# Patient Record
Sex: FEMALE | Race: WHITE | NOT HISPANIC OR LATINO | Employment: OTHER | URBAN - METROPOLITAN AREA
[De-identification: names, ages, dates, MRNs, and addresses within clinical notes are randomized per-mention and may not be internally consistent; named-entity substitution may affect disease eponyms.]

---

## 2023-02-21 LAB
ALANINE AMINOTRANSFERASE (SGPT) (U/L) IN SER/PLAS: 9 U/L (ref 7–45)
ALBUMIN (G/DL) IN SER/PLAS: 4.2 G/DL (ref 3.4–5)
ALKALINE PHOSPHATASE (U/L) IN SER/PLAS: 78 U/L (ref 33–136)
ANION GAP IN SER/PLAS: 13 MMOL/L (ref 10–20)
ASPARTATE AMINOTRANSFERASE (SGOT) (U/L) IN SER/PLAS: 14 U/L (ref 9–39)
BILIRUBIN TOTAL (MG/DL) IN SER/PLAS: 0.7 MG/DL (ref 0–1.2)
CALCIUM (MG/DL) IN SER/PLAS: 9.3 MG/DL (ref 8.6–10.3)
CARBON DIOXIDE, TOTAL (MMOL/L) IN SER/PLAS: 28 MMOL/L (ref 21–32)
CHLORIDE (MMOL/L) IN SER/PLAS: 103 MMOL/L (ref 98–107)
CHOLESTEROL (MG/DL) IN SER/PLAS: 266 MG/DL (ref 0–199)
CHOLESTEROL IN HDL (MG/DL) IN SER/PLAS: 65.6 MG/DL
CHOLESTEROL/HDL RATIO: 4.1
CREATININE (MG/DL) IN SER/PLAS: 1.49 MG/DL (ref 0.5–1.05)
ERYTHROCYTE DISTRIBUTION WIDTH (RATIO) BY AUTOMATED COUNT: 12.3 % (ref 11.5–14.5)
ERYTHROCYTE MEAN CORPUSCULAR HEMOGLOBIN CONCENTRATION (G/DL) BY AUTOMATED: 30.9 G/DL (ref 32–36)
ERYTHROCYTE MEAN CORPUSCULAR VOLUME (FL) BY AUTOMATED COUNT: 96 FL (ref 80–100)
ERYTHROCYTES (10*6/UL) IN BLOOD BY AUTOMATED COUNT: 4.04 X10E12/L (ref 4–5.2)
GFR FEMALE: 36 ML/MIN/1.73M2
GLUCOSE (MG/DL) IN SER/PLAS: 85 MG/DL (ref 74–99)
HEMATOCRIT (%) IN BLOOD BY AUTOMATED COUNT: 38.8 % (ref 36–46)
HEMOGLOBIN (G/DL) IN BLOOD: 12 G/DL (ref 12–16)
LDL: 176 MG/DL (ref 0–99)
LEUKOCYTES (10*3/UL) IN BLOOD BY AUTOMATED COUNT: 9.5 X10E9/L (ref 4.4–11.3)
PLATELETS (10*3/UL) IN BLOOD AUTOMATED COUNT: 283 X10E9/L (ref 150–450)
POTASSIUM (MMOL/L) IN SER/PLAS: 4.4 MMOL/L (ref 3.5–5.3)
PROTEIN TOTAL: 6.5 G/DL (ref 6.4–8.2)
SODIUM (MMOL/L) IN SER/PLAS: 140 MMOL/L (ref 136–145)
TRIGLYCERIDE (MG/DL) IN SER/PLAS: 121 MG/DL (ref 0–149)
UREA NITROGEN (MG/DL) IN SER/PLAS: 33 MG/DL (ref 6–23)
VLDL: 24 MG/DL (ref 0–40)

## 2023-05-08 ENCOUNTER — TELEPHONE (OUTPATIENT)
Dept: PRIMARY CARE | Facility: CLINIC | Age: 78
End: 2023-05-08
Payer: MEDICARE

## 2023-05-11 ENCOUNTER — PATIENT OUTREACH (OUTPATIENT)
Dept: PRIMARY CARE | Facility: CLINIC | Age: 78
End: 2023-05-11
Payer: MEDICARE

## 2023-05-11 DIAGNOSIS — K50.119: ICD-10-CM

## 2023-05-11 RX ORDER — AMLODIPINE BESYLATE 10 MG/1
1 TABLET ORAL DAILY
COMMUNITY
Start: 2021-05-11 | End: 2023-08-15 | Stop reason: SDUPTHER

## 2023-05-11 RX ORDER — DICYCLOMINE HYDROCHLORIDE 10 MG/1
10 CAPSULE ORAL 4 TIMES DAILY
COMMUNITY
Start: 2023-05-09 | End: 2024-03-11 | Stop reason: WASHOUT

## 2023-05-11 RX ORDER — CLOPIDOGREL BISULFATE 75 MG/1
1 TABLET ORAL DAILY
COMMUNITY
Start: 2012-05-30 | End: 2023-10-06 | Stop reason: SDUPTHER

## 2023-05-11 RX ORDER — LISINOPRIL 10 MG/1
5 TABLET ORAL 2 TIMES DAILY
COMMUNITY
Start: 2013-01-04 | End: 2023-10-06 | Stop reason: SDUPTHER

## 2023-05-11 RX ORDER — TIZANIDINE 2 MG/1
1 TABLET ORAL NIGHTLY
COMMUNITY
Start: 2022-09-01 | End: 2024-04-16 | Stop reason: ALTCHOICE

## 2023-05-11 RX ORDER — PANTOPRAZOLE SODIUM 40 MG/1
1 TABLET, DELAYED RELEASE ORAL 2 TIMES DAILY
COMMUNITY
Start: 2018-10-24 | End: 2024-04-15 | Stop reason: SDUPTHER

## 2023-05-11 RX ORDER — HYDROXYCHLOROQUINE SULFATE 200 MG/1
1 TABLET, FILM COATED ORAL DAILY
COMMUNITY
Start: 2017-03-10

## 2023-05-11 RX ORDER — ROSUVASTATIN CALCIUM 10 MG/1
1 TABLET, COATED ORAL NIGHTLY
COMMUNITY
Start: 2021-10-26 | End: 2024-03-11 | Stop reason: WASHOUT

## 2023-05-11 RX ORDER — ONDANSETRON 4 MG/1
8 TABLET, FILM COATED ORAL EVERY 8 HOURS PRN
COMMUNITY
Start: 2023-05-09 | End: 2024-04-16 | Stop reason: WASHOUT

## 2023-05-11 NOTE — PROGRESS NOTES
Discharge Facility: [facility]  Discharge Diagnosis: SHEKHAR on CKD; Segmental Colitis  Admission Date: 5/8/23  Discharge Date: 5/9/23    PCP Appointment Date: 5/22/23  Specialist Appointment Date: GI- waiting to schedule colonoscopy  Hospital Encounter and Summary: Linked   See discharge assessment below for further details   Engagement  Call Start Time: 1500 (5/11/2023  4:56 PM)    Medications  Medications reviewed with patient/caregiver?: Yes (5/11/2023  4:56 PM)  Is the patient having any side effects they believe may be caused by any medication additions or changes?: No (5/11/2023  4:56 PM)  Does the patient have all medications ordered at discharge?: Yes (5/11/2023  4:56 PM)  Care Management Interventions: No intervention needed (5/11/2023  4:56 PM)  Is the patient taking all medications as directed (includes completed medication regime)?: Yes (5/11/2023  4:56 PM)  Care Management Interventions: Provided patient education (5/11/2023  4:56 PM)  Medication Comments: See medication list (5/11/2023  4:56 PM)    Appointments  Does the patient have a primary care provider?: Yes (5/11/2023  4:56 PM)  Care Management Interventions: Verified appointment date/time/provider (5/11/2023  4:56 PM)  Has the patient kept scheduled appointments due by today?: Not applicable (5/11/2023  4:56 PM)  Care Management Interventions: Advised patient to keep appointment (5/11/2023  4:56 PM)    Self Management  What is the home health agency?: N/A (5/11/2023  4:56 PM)  Has home health visited the patient within 72 hours of discharge?: Not applicable (5/11/2023  4:56 PM)  What Durable Medical Equipment (DME) was ordered?: N/A (5/11/2023  4:56 PM)    Patient Teaching  Does the patient have access to their discharge instructions?: Yes (5/11/2023  4:56 PM)  Care Management Interventions: Reviewed instructions with patient (5/11/2023  4:56 PM)  What is the patient's perception of their health status since discharge?: Improving (5/11/2023  4:56  PM)  Is the patient/caregiver able to teach back the hierarchy of who to call/visit for symptoms/problems? PCP, Specialist, Home Health nurse, Urgent Care, ED, 911: Yes (5/11/2023  4:56 PM)    Wrap Up  Wrap Up Additional Comments: Patient states she is home and recovering well post hospitalization. Patient denied any new or worsening symptoms. Patient denied need for DME or home health services. Patient states she has help in the home from family if needed. PCP follow up appt scheduled. Patient states she has her discharge summary and all medications needed in the home. Patient denied any concerns or issues regarding her medications or hospitalization. Patient was encouraged to call TCM with any questions or concerns to prevent re-hospitalization. Phone number provided to patient. Patient verbalized understanding and stated she had no further questions or concerns at this time. (5/11/2023  4:56 PM)  Call End Time: 1515 (5/11/2023  4:56 PM)

## 2023-05-15 ENCOUNTER — TELEPHONE (OUTPATIENT)
Dept: PRIMARY CARE | Facility: CLINIC | Age: 78
End: 2023-05-15
Payer: MEDICARE

## 2023-05-22 ENCOUNTER — OFFICE VISIT (OUTPATIENT)
Dept: PRIMARY CARE | Facility: CLINIC | Age: 78
End: 2023-05-22
Payer: MEDICARE

## 2023-05-22 ENCOUNTER — APPOINTMENT (OUTPATIENT)
Dept: PRIMARY CARE | Facility: CLINIC | Age: 78
End: 2023-05-22
Payer: MEDICARE

## 2023-05-22 ENCOUNTER — LAB (OUTPATIENT)
Dept: LAB | Facility: LAB | Age: 78
End: 2023-05-22
Payer: MEDICARE

## 2023-05-22 VITALS
OXYGEN SATURATION: 96 % | BODY MASS INDEX: 25.68 KG/M2 | SYSTOLIC BLOOD PRESSURE: 118 MMHG | DIASTOLIC BLOOD PRESSURE: 62 MMHG | HEIGHT: 60 IN | WEIGHT: 130.8 LBS | RESPIRATION RATE: 18 BRPM | HEART RATE: 76 BPM

## 2023-05-22 DIAGNOSIS — Z09 HOSPITAL DISCHARGE FOLLOW-UP: ICD-10-CM

## 2023-05-22 DIAGNOSIS — N17.9 ACUTE KIDNEY INJURY (CMS-HCC): ICD-10-CM

## 2023-05-22 DIAGNOSIS — R07.81 RIB PAIN ON RIGHT SIDE: Primary | ICD-10-CM

## 2023-05-22 PROBLEM — G89.29 CHRONIC LOW BACK PAIN: Status: ACTIVE | Noted: 2023-05-22

## 2023-05-22 PROBLEM — R29.898 WEAKNESS OF BOTH LOWER EXTREMITIES: Status: RESOLVED | Noted: 2023-05-22 | Resolved: 2023-05-22

## 2023-05-22 PROBLEM — L90.5 SCAR ADHERENT: Status: RESOLVED | Noted: 2023-05-22 | Resolved: 2023-05-22

## 2023-05-22 PROBLEM — M43.10 ACQUIRED SPONDYLOLISTHESIS: Status: ACTIVE | Noted: 2023-05-22

## 2023-05-22 PROBLEM — Z96.659 PAIN DUE TO TOTAL KNEE REPLACEMENT, INITIAL ENCOUNTER (CMS-HCC): Status: RESOLVED | Noted: 2023-05-22 | Resolved: 2023-05-22

## 2023-05-22 PROBLEM — M79.641 PAIN IN BOTH HANDS: Status: RESOLVED | Noted: 2023-05-22 | Resolved: 2023-05-22

## 2023-05-22 PROBLEM — M25.532 PAIN IN BOTH WRISTS: Status: RESOLVED | Noted: 2023-05-22 | Resolved: 2023-05-22

## 2023-05-22 PROBLEM — R26.2 DIFFICULTY WALKING: Status: RESOLVED | Noted: 2023-05-22 | Resolved: 2023-05-22

## 2023-05-22 PROBLEM — M25.552 PAIN OF LEFT HIP JOINT: Status: RESOLVED | Noted: 2023-05-22 | Resolved: 2023-05-22

## 2023-05-22 PROBLEM — K44.9 HIATAL HERNIA: Status: RESOLVED | Noted: 2023-05-22 | Resolved: 2023-05-22

## 2023-05-22 PROBLEM — M19.049 OSTEOARTHRITIS, HAND: Status: RESOLVED | Noted: 2023-05-22 | Resolved: 2023-05-22

## 2023-05-22 PROBLEM — H02.9 EYELID ABNORMALITY: Status: RESOLVED | Noted: 2023-05-22 | Resolved: 2023-05-22

## 2023-05-22 PROBLEM — R82.90 ABNORMAL URINE FINDINGS: Status: RESOLVED | Noted: 2023-05-22 | Resolved: 2023-05-22

## 2023-05-22 PROBLEM — G56.02 CARPAL TUNNEL SYNDROME OF LEFT WRIST: Status: RESOLVED | Noted: 2023-05-22 | Resolved: 2023-05-22

## 2023-05-22 PROBLEM — H25.12 AGE-RELATED NUCLEAR CATARACT OF LEFT EYE: Status: ACTIVE | Noted: 2023-05-22

## 2023-05-22 PROBLEM — M54.9 ACUTE BACK PAIN: Status: RESOLVED | Noted: 2023-05-22 | Resolved: 2023-05-22

## 2023-05-22 PROBLEM — T84.84XA PAIN DUE TO TOTAL KNEE REPLACEMENT, INITIAL ENCOUNTER (CMS-HCC): Status: RESOLVED | Noted: 2023-05-22 | Resolved: 2023-05-22

## 2023-05-22 PROBLEM — H25.13 CATARACT, NUCLEAR SCLEROTIC, BOTH EYES: Status: RESOLVED | Noted: 2023-05-22 | Resolved: 2023-05-22

## 2023-05-22 PROBLEM — I65.22 LEFT-SIDED EXTRACRANIAL CAROTID ARTERY OCCLUSION: Status: RESOLVED | Noted: 2018-12-19 | Resolved: 2023-05-22

## 2023-05-22 PROBLEM — L71.8 OCULAR ROSACEA: Status: RESOLVED | Noted: 2023-05-22 | Resolved: 2023-05-22

## 2023-05-22 PROBLEM — K21.9 ESOPHAGEAL REFLUX: Status: ACTIVE | Noted: 2023-05-22

## 2023-05-22 PROBLEM — R42 DIZZINESS: Status: RESOLVED | Noted: 2021-07-08 | Resolved: 2023-05-22

## 2023-05-22 PROBLEM — M65.321 TRIGGER INDEX FINGER OF RIGHT HAND: Status: RESOLVED | Noted: 2023-05-22 | Resolved: 2023-05-22

## 2023-05-22 PROBLEM — H81.10 BENIGN PAROXYSMAL POSITIONAL VERTIGO: Status: ACTIVE | Noted: 2021-07-08

## 2023-05-22 PROBLEM — M43.16 SPONDYLOLISTHESIS OF LUMBAR REGION: Status: RESOLVED | Noted: 2023-05-22 | Resolved: 2023-05-22

## 2023-05-22 PROBLEM — H25.11 AGE-RELATED NUCLEAR CATARACT OF RIGHT EYE: Status: RESOLVED | Noted: 2023-05-22 | Resolved: 2023-05-22

## 2023-05-22 PROBLEM — H90.3 SENSORINEURAL HEARING LOSS, BILATERAL: Status: ACTIVE | Noted: 2021-08-26

## 2023-05-22 PROBLEM — M25.639 WRIST STIFFNESS: Status: RESOLVED | Noted: 2023-05-22 | Resolved: 2023-05-22

## 2023-05-22 PROBLEM — L60.8: Status: RESOLVED | Noted: 2023-05-22 | Resolved: 2023-05-22

## 2023-05-22 PROBLEM — M16.12 ARTHRITIS OF LEFT HIP: Status: RESOLVED | Noted: 2023-05-22 | Resolved: 2023-05-22

## 2023-05-22 PROBLEM — E78.5 HYPERLIPIDEMIA: Status: ACTIVE | Noted: 2023-05-22

## 2023-05-22 PROBLEM — M25.511 RIGHT SHOULDER PAIN: Status: RESOLVED | Noted: 2023-05-22 | Resolved: 2023-05-22

## 2023-05-22 PROBLEM — N28.89 RENAL MASS, RIGHT: Status: RESOLVED | Noted: 2023-05-22 | Resolved: 2023-05-22

## 2023-05-22 PROBLEM — S60.221A CONTUSION OF RIGHT HAND: Status: RESOLVED | Noted: 2023-05-22 | Resolved: 2023-05-22

## 2023-05-22 PROBLEM — M46.1 SACROILIITIS (CMS-HCC): Status: RESOLVED | Noted: 2023-05-22 | Resolved: 2023-05-22

## 2023-05-22 PROBLEM — M54.50 CHRONIC LOW BACK PAIN: Status: ACTIVE | Noted: 2023-05-22

## 2023-05-22 PROBLEM — R26.9 GAIT DIFFICULTY: Status: RESOLVED | Noted: 2023-05-22 | Resolved: 2023-05-22

## 2023-05-22 PROBLEM — K58.0 IRRITABLE BOWEL SYNDROME WITH DIARRHEA: Status: ACTIVE | Noted: 2023-05-22

## 2023-05-22 PROBLEM — M25.561 RIGHT KNEE PAIN: Status: RESOLVED | Noted: 2023-05-22 | Resolved: 2023-05-22

## 2023-05-22 PROBLEM — G56.01 CARPAL TUNNEL SYNDROME OF RIGHT WRIST: Status: RESOLVED | Noted: 2023-05-22 | Resolved: 2023-05-22

## 2023-05-22 PROBLEM — Z96.652 HISTORY OF TOTAL LEFT KNEE REPLACEMENT: Status: RESOLVED | Noted: 2023-05-22 | Resolved: 2023-05-22

## 2023-05-22 PROBLEM — H25.11 AGE-RELATED NUCLEAR CATARACT OF RIGHT EYE: Status: ACTIVE | Noted: 2023-05-22

## 2023-05-22 PROBLEM — M48.062 NEUROGENIC CLAUDICATION DUE TO LUMBAR SPINAL STENOSIS: Status: RESOLVED | Noted: 2023-05-22 | Resolved: 2023-05-22

## 2023-05-22 PROBLEM — H04.123 DRY EYE SYNDROME OF BOTH LACRIMAL GLANDS: Status: RESOLVED | Noted: 2023-05-22 | Resolved: 2023-05-22

## 2023-05-22 PROBLEM — H52.03 HYPEROPIA OF BOTH EYES: Status: RESOLVED | Noted: 2023-05-22 | Resolved: 2023-05-22

## 2023-05-22 PROBLEM — T46.6X5A MYALGIA DUE TO STATIN: Status: ACTIVE | Noted: 2023-05-22

## 2023-05-22 PROBLEM — R21 RASH OF FACE: Status: RESOLVED | Noted: 2023-05-22 | Resolved: 2023-05-22

## 2023-05-22 PROBLEM — I10 BENIGN ESSENTIAL HYPERTENSION: Status: ACTIVE | Noted: 2023-05-22

## 2023-05-22 PROBLEM — L71.9 ROSACEA: Status: RESOLVED | Noted: 2023-05-22 | Resolved: 2023-05-22

## 2023-05-22 PROBLEM — M25.562 LEFT KNEE PAIN: Status: ACTIVE | Noted: 2017-11-13

## 2023-05-22 PROBLEM — M25.832 MASS OF JOINT OF LEFT WRIST: Status: RESOLVED | Noted: 2023-05-22 | Resolved: 2023-05-22

## 2023-05-22 PROBLEM — M32.9 SYSTEMIC LUPUS ERYTHEMATOSUS (MULTI): Status: RESOLVED | Noted: 2023-05-22 | Resolved: 2023-05-22

## 2023-05-22 PROBLEM — M17.11 ARTHRITIS OF RIGHT KNEE: Status: RESOLVED | Noted: 2023-05-22 | Resolved: 2023-05-22

## 2023-05-22 PROBLEM — M25.562 LEFT KNEE PAIN: Status: RESOLVED | Noted: 2017-11-13 | Resolved: 2023-05-22

## 2023-05-22 PROBLEM — H01.019 ULCERATIVE BLEPHARITIS: Status: RESOLVED | Noted: 2023-05-22 | Resolved: 2023-05-22

## 2023-05-22 PROBLEM — M25.531 PAIN IN BOTH WRISTS: Status: RESOLVED | Noted: 2023-05-22 | Resolved: 2023-05-22

## 2023-05-22 PROBLEM — K21.00 GASTROESOPHAGEAL REFLUX DISEASE WITH ESOPHAGITIS: Status: RESOLVED | Noted: 2023-05-22 | Resolved: 2023-05-22

## 2023-05-22 PROBLEM — H81.10 BENIGN PAROXYSMAL POSITIONAL VERTIGO: Status: RESOLVED | Noted: 2021-07-08 | Resolved: 2023-05-22

## 2023-05-22 PROBLEM — E55.9 MILD VITAMIN D DEFICIENCY: Status: RESOLVED | Noted: 2023-05-22 | Resolved: 2023-05-22

## 2023-05-22 PROBLEM — G60.9 IDIOPATHIC PERIPHERAL NEUROPATHY: Status: ACTIVE | Noted: 2023-05-22

## 2023-05-22 PROBLEM — H35.363 DRUSEN (DEGENERATIVE) OF MACULA, BILATERAL: Status: RESOLVED | Noted: 2023-05-22 | Resolved: 2023-05-22

## 2023-05-22 PROBLEM — M19.049 OSTEOARTHRITIS, HAND: Status: ACTIVE | Noted: 2023-05-22

## 2023-05-22 PROBLEM — R42 VERTIGO: Status: RESOLVED | Noted: 2023-05-22 | Resolved: 2023-05-22

## 2023-05-22 PROBLEM — K57.30 DIVERTICULOSIS OF COLON: Status: RESOLVED | Noted: 2023-05-22 | Resolved: 2023-05-22

## 2023-05-22 PROBLEM — M79.642 PAIN IN BOTH HANDS: Status: RESOLVED | Noted: 2023-05-22 | Resolved: 2023-05-22

## 2023-05-22 PROBLEM — R29.898 WRIST WEAKNESS: Status: RESOLVED | Noted: 2023-05-22 | Resolved: 2023-05-22

## 2023-05-22 PROBLEM — E28.39 ESTROGEN DEFICIENCY: Status: RESOLVED | Noted: 2023-05-22 | Resolved: 2023-05-22

## 2023-05-22 PROBLEM — H25.12 AGE-RELATED NUCLEAR CATARACT OF LEFT EYE: Status: RESOLVED | Noted: 2023-05-22 | Resolved: 2023-05-22

## 2023-05-22 PROBLEM — M79.10 MYALGIA DUE TO STATIN: Status: ACTIVE | Noted: 2023-05-22

## 2023-05-22 PROBLEM — M70.62 GREATER TROCHANTERIC BURSITIS OF LEFT HIP: Status: RESOLVED | Noted: 2023-05-22 | Resolved: 2023-05-22

## 2023-05-22 PROBLEM — S69.91XA INJURY OF FINGER OF RIGHT HAND: Status: RESOLVED | Noted: 2023-05-22 | Resolved: 2023-05-22

## 2023-05-22 PROBLEM — M65.30 TRIGGER FINGER, ACQUIRED: Status: RESOLVED | Noted: 2023-05-22 | Resolved: 2023-05-22

## 2023-05-22 LAB
ALANINE AMINOTRANSFERASE (SGPT) (U/L) IN SER/PLAS: 16 U/L (ref 7–45)
ALBUMIN (G/DL) IN SER/PLAS: 4.4 G/DL (ref 3.4–5)
ALKALINE PHOSPHATASE (U/L) IN SER/PLAS: 67 U/L (ref 33–136)
ANION GAP IN SER/PLAS: 13 MMOL/L (ref 10–20)
ASPARTATE AMINOTRANSFERASE (SGOT) (U/L) IN SER/PLAS: 21 U/L (ref 9–39)
BILIRUBIN TOTAL (MG/DL) IN SER/PLAS: 0.4 MG/DL (ref 0–1.2)
CALCIUM (MG/DL) IN SER/PLAS: 9.4 MG/DL (ref 8.6–10.3)
CARBON DIOXIDE, TOTAL (MMOL/L) IN SER/PLAS: 26 MMOL/L (ref 21–32)
CHLORIDE (MMOL/L) IN SER/PLAS: 103 MMOL/L (ref 98–107)
CREATININE (MG/DL) IN SER/PLAS: 1.26 MG/DL (ref 0.5–1.05)
ERYTHROCYTE DISTRIBUTION WIDTH (RATIO) BY AUTOMATED COUNT: 12.5 % (ref 11.5–14.5)
ERYTHROCYTE MEAN CORPUSCULAR HEMOGLOBIN CONCENTRATION (G/DL) BY AUTOMATED: 31.2 G/DL (ref 32–36)
ERYTHROCYTE MEAN CORPUSCULAR VOLUME (FL) BY AUTOMATED COUNT: 96 FL (ref 80–100)
ERYTHROCYTES (10*6/UL) IN BLOOD BY AUTOMATED COUNT: 4.03 X10E12/L (ref 4–5.2)
GFR FEMALE: 44 ML/MIN/1.73M2
GLUCOSE (MG/DL) IN SER/PLAS: 92 MG/DL (ref 74–99)
HEMATOCRIT (%) IN BLOOD BY AUTOMATED COUNT: 38.5 % (ref 36–46)
HEMOGLOBIN (G/DL) IN BLOOD: 12 G/DL (ref 12–16)
LEUKOCYTES (10*3/UL) IN BLOOD BY AUTOMATED COUNT: 9 X10E9/L (ref 4.4–11.3)
PLATELETS (10*3/UL) IN BLOOD AUTOMATED COUNT: 340 X10E9/L (ref 150–450)
POTASSIUM (MMOL/L) IN SER/PLAS: 3.9 MMOL/L (ref 3.5–5.3)
PROTEIN TOTAL: 7.2 G/DL (ref 6.4–8.2)
SODIUM (MMOL/L) IN SER/PLAS: 138 MMOL/L (ref 136–145)
UREA NITROGEN (MG/DL) IN SER/PLAS: 29 MG/DL (ref 6–23)

## 2023-05-22 PROCEDURE — 99495 TRANSJ CARE MGMT MOD F2F 14D: CPT | Performed by: NURSE PRACTITIONER

## 2023-05-22 PROCEDURE — 36415 COLL VENOUS BLD VENIPUNCTURE: CPT

## 2023-05-22 PROCEDURE — 1159F MED LIST DOCD IN RCRD: CPT | Performed by: NURSE PRACTITIONER

## 2023-05-22 PROCEDURE — 1036F TOBACCO NON-USER: CPT | Performed by: NURSE PRACTITIONER

## 2023-05-22 PROCEDURE — 3074F SYST BP LT 130 MM HG: CPT | Performed by: NURSE PRACTITIONER

## 2023-05-22 PROCEDURE — 1157F ADVNC CARE PLAN IN RCRD: CPT | Performed by: NURSE PRACTITIONER

## 2023-05-22 PROCEDURE — 80053 COMPREHEN METABOLIC PANEL: CPT

## 2023-05-22 PROCEDURE — 1160F RVW MEDS BY RX/DR IN RCRD: CPT | Performed by: NURSE PRACTITIONER

## 2023-05-22 PROCEDURE — 85027 COMPLETE CBC AUTOMATED: CPT

## 2023-05-22 PROCEDURE — 3078F DIAST BP <80 MM HG: CPT | Performed by: NURSE PRACTITIONER

## 2023-05-22 RX ORDER — GLUCOSAMINE HCL 500 MG
1 TABLET ORAL DAILY
COMMUNITY
Start: 2017-03-23

## 2023-05-22 RX ORDER — AMITRIPTYLINE HYDROCHLORIDE 25 MG/1
TABLET, FILM COATED ORAL
COMMUNITY
Start: 2017-01-05 | End: 2023-10-23 | Stop reason: SDUPTHER

## 2023-05-22 RX ORDER — MECLIZINE HYDROCHLORIDE 25 MG/1
25 TABLET ORAL AS NEEDED
COMMUNITY
Start: 2021-06-23

## 2023-05-22 RX ORDER — ACETAMINOPHEN 500 MG
50 TABLET ORAL DAILY
COMMUNITY
Start: 2015-12-28

## 2023-05-22 RX ORDER — HYDROCHLOROTHIAZIDE 12.5 MG/1
1 TABLET ORAL DAILY
COMMUNITY
Start: 2023-02-14

## 2023-05-22 RX ORDER — ONDANSETRON 4 MG/1
4 TABLET, ORALLY DISINTEGRATING ORAL AS NEEDED
COMMUNITY
Start: 2021-06-23 | End: 2024-04-16 | Stop reason: ALTCHOICE

## 2023-05-22 NOTE — ASSESSMENT & PLAN NOTE
Hx of CVA  Recommended statin - had myaglia with rosuvastatin  Would like to try another statin   Discussed waiting until labs are complete and EGD and colonoscopy completed

## 2023-05-22 NOTE — PROGRESS NOTES
Subjective   Patient ID: Vicky Govea is a 78 y.o. female who presents for Hospital Follow-up (Patient in today for hospital F/U d/t Colitis, states she was hospitalized for one day. She would also like to discuss low RBC, LBP and to discuss going back on cholesterol medication ).    Following up for hospitalization for GIBleed, SHEKHAR, and colitis 5/8-5/9  + FOBT  Will have colonoscopy and EGD tomorrow with Dr. Herndon  Did receive IV ATB but was not discharge on oral ATB  Has not had repeat labs  Has not had BRBPR since3 5/10  Has had mild LLQ cramping since discharge.      Has had hx of CVA - was recommended to start statin - did try rosuvastatin but she had muscle aches in her feet and legs - which resolved when stopping  Would like to discuss another statin    Will have intermittent low thoracic back pain - will happen occasionally -last for 5 minutes - described as sharp and does not radiate.  Resolves on its own.  Denies fall or injury.             Review of Systems   All other systems reviewed and are negative.      Objective   /62   Pulse 76   Resp 18   Ht 1.524 m (5')   Wt 59.3 kg (130 lb 12.8 oz)   SpO2 96%   BMI 25.55 kg/m²     Physical Exam  Vitals and nursing note reviewed.   Constitutional:       Appearance: Normal appearance. She is normal weight.   HENT:      Head: Normocephalic and atraumatic.      Right Ear: External ear normal.      Left Ear: External ear normal.      Nose: Nose normal.      Mouth/Throat:      Mouth: Mucous membranes are moist.      Pharynx: Oropharynx is clear.   Eyes:      Extraocular Movements: Extraocular movements intact.      Pupils: Pupils are equal, round, and reactive to light.   Neck:      Vascular: No carotid bruit.   Cardiovascular:      Rate and Rhythm: Normal rate and regular rhythm.      Pulses: Normal pulses.      Heart sounds: Normal heart sounds.   Pulmonary:      Effort: Pulmonary effort is normal.      Breath sounds: Normal breath sounds.    Abdominal:      General: Bowel sounds are normal.      Palpations: Abdomen is soft.   Musculoskeletal:         General: Normal range of motion.      Cervical back: Normal range of motion.      Right lower leg: No edema.      Left lower leg: No edema.   Skin:     General: Skin is warm.      Capillary Refill: Capillary refill takes less than 2 seconds.   Neurological:      General: No focal deficit present.      Mental Status: She is alert and oriented to person, place, and time. Mental status is at baseline.   Psychiatric:         Mood and Affect: Mood normal.         Behavior: Behavior normal.         Thought Content: Thought content normal.         Judgment: Judgment normal.         Assessment/Plan

## 2023-05-22 NOTE — PATIENT INSTRUCTIONS
Have the lab work completed and I will let you know how it looks    I ordered a chest xray to look at the area in your back that hurts

## 2023-05-22 NOTE — PROGRESS NOTES
"Patient: Vicky Govea  : 1945  PCP: Ruma Martinez, APRN-CNP  MRN: [id number]  Program: No linked episodes     Vicky Govea is a 78 y.o. female presenting today for follow-up after being discharged from the hospital 11 days ago. The main problem requiring admission was colitis. The discharge summary and/or Transitional Care Management documentation was reviewed. Medication reconciliation was performed as indicated via the \"Enrrique as Reviewed\" timestamp.     Vicky Govea was contacted by Transitional Care Management services two days after her discharge. This encounter and supporting documentation was reviewed.    The complexity of medical decision making for this patient's transitional care is moderate.    Has had continue right posterior rib pain that does not radiate x 3 months.  No injury  Physical Exam  Vitals and nursing note reviewed.   Constitutional:       General: She is not in acute distress.     Appearance: Normal appearance. She is normal weight.   HENT:      Head: Normocephalic.      Right Ear: External ear normal.      Mouth/Throat:      Mouth: Mucous membranes are moist.      Pharynx: Oropharynx is clear.   Eyes:      Extraocular Movements: Extraocular movements intact.      Conjunctiva/sclera: Conjunctivae normal.      Pupils: Pupils are equal, round, and reactive to light.   Neck:      Vascular: No carotid bruit.   Cardiovascular:      Rate and Rhythm: Normal rate and regular rhythm.      Heart sounds: Normal heart sounds.   Pulmonary:      Effort: Pulmonary effort is normal.      Breath sounds: Normal breath sounds.   Abdominal:      General: Bowel sounds are normal. There is no distension.      Palpations: Abdomen is soft.   Musculoskeletal:      Right lower leg: No edema.      Left lower leg: No edema.   Lymphadenopathy:      Cervical: No cervical adenopathy.   Skin:     General: Skin is warm and dry.      Capillary Refill: Capillary refill takes less than 2 seconds. "   Neurological:      General: No focal deficit present.      Mental Status: She is alert. Mental status is at baseline.   Psychiatric:         Mood and Affect: Mood normal.         Behavior: Behavior normal.         Thought Content: Thought content normal.         Judgment: Judgment normal.         Assessment/Plan   Problem List Items Addressed This Visit          Genitourinary    Acute kidney injury (CMS/HCC)     Repeat cbc,cmp         Relevant Orders    CBC    Comprehensive Metabolic Panel       Musculoskeletal    Rib pain on right side - Primary     Xray chest         Relevant Orders    XR chest 2 views       Other    Hospital discharge follow-up     Reviewed hospital records  Cbc,cmp  Reviewed medications  Follow up with egd and colonoscopy tomorrow as scheduled            Review of Systems   All other systems reviewed and are negative.      No family history on file.    Engagement  Call Start Time: 1500 (5/11/2023  4:56 PM)    Medications  Medications reviewed with patient/caregiver?: Yes (5/11/2023  4:56 PM)  Is the patient having any side effects they believe may be caused by any medication additions or changes?: No (5/11/2023  4:56 PM)  Does the patient have all medications ordered at discharge?: Yes (5/11/2023  4:56 PM)  Care Management Interventions: No intervention needed (5/11/2023  4:56 PM)  Is the patient taking all medications as directed (includes completed medication regime)?: Yes (5/11/2023  4:56 PM)  Care Management Interventions: Provided patient education (5/11/2023  4:56 PM)  Medication Comments: See medication list (5/11/2023  4:56 PM)    Appointments  Does the patient have a primary care provider?: Yes (5/11/2023  4:56 PM)  Care Management Interventions: Verified appointment date/time/provider (5/11/2023  4:56 PM)  Has the patient kept scheduled appointments due by today?: Not applicable (5/11/2023  4:56 PM)  Care Management Interventions: Advised patient to keep appointment (5/11/2023  4:56  PM)    Self Management  What is the home health agency?: N/A (5/11/2023  4:56 PM)  Has home health visited the patient within 72 hours of discharge?: Not applicable (5/11/2023  4:56 PM)  What Durable Medical Equipment (DME) was ordered?: N/A (5/11/2023  4:56 PM)    Patient Teaching  Does the patient have access to their discharge instructions?: Yes (5/11/2023  4:56 PM)  Care Management Interventions: Reviewed instructions with patient (5/11/2023  4:56 PM)  What is the patient's perception of their health status since discharge?: Improving (5/11/2023  4:56 PM)  Is the patient/caregiver able to teach back the hierarchy of who to call/visit for symptoms/problems? PCP, Specialist, Home Health nurse, Urgent Care, ED, 911: Yes (5/11/2023  4:56 PM)    Wrap Up  Wrap Up Additional Comments: Patient states she is home and recovering well post hospitalization. Patient denied any new or worsening symptoms. Patient denied need for DME or home health services. Patient states she has help in the home from family if needed. PCP follow up appt scheduled. Patient states she has her discharge summary and all medications needed in the home. Patient denied any concerns or issues regarding her medications or hospitalization. Patient was encouraged to call TCM with any questions or concerns to prevent re-hospitalization. Phone number provided to patient. Patient verbalized understanding and stated she had no further questions or concerns at this time. (5/11/2023  4:56 PM)  Call End Time: 1515 (5/11/2023  4:56 PM)        No follow-ups on file.

## 2023-05-22 NOTE — ASSESSMENT & PLAN NOTE
Reviewed hospital records  Cbc,cmp  Reviewed medications  Follow up with egd and colonoscopy tomorrow as scheduled

## 2023-05-23 DIAGNOSIS — I63.9 CEREBRAL INFARCTION, UNSPECIFIED MECHANISM (MULTI): Primary | ICD-10-CM

## 2023-05-23 RX ORDER — ATORVASTATIN CALCIUM 10 MG/1
10 TABLET, FILM COATED ORAL DAILY
Qty: 30 TABLET | Refills: 5 | Status: SHIPPED | OUTPATIENT
Start: 2023-05-23 | End: 2023-09-19 | Stop reason: SDUPTHER

## 2023-05-23 NOTE — RESULT ENCOUNTER NOTE
Spoke with patient informed her of results she would like to start on Atorvastatin 10 mg and she would also like to know if there is something she can do to help increase her RBC.

## 2023-05-25 ENCOUNTER — PATIENT OUTREACH (OUTPATIENT)
Dept: PRIMARY CARE | Facility: CLINIC | Age: 78
End: 2023-05-25
Payer: MEDICARE

## 2023-05-25 DIAGNOSIS — R07.81 RIB PAIN ON RIGHT SIDE: ICD-10-CM

## 2023-05-25 NOTE — PROGRESS NOTES
Call regarding appt. with PCP on 5/22/23 after hospitalization.  At time of outreach call the patient feels as if their condition has improved since last visit.  Reviewed the PCP appointment with the pt and addressed any questions or concerns. Patient states she is feeling well lately with no questions or concerns at this time. Patient was encouraged to call TCM with any needs or questions that may arise.

## 2023-06-19 ENCOUNTER — PATIENT OUTREACH (OUTPATIENT)
Dept: PRIMARY CARE | Facility: CLINIC | Age: 78
End: 2023-06-19
Payer: MEDICARE

## 2023-06-19 DIAGNOSIS — N17.9 ACUTE KIDNEY INJURY (CMS-HCC): ICD-10-CM

## 2023-06-19 NOTE — PROGRESS NOTES
Call placed regarding one month post discharge follow up call.  At time of outreach call the patient feels as if their condition has returned to baseline since initial visit with PCP or specialist.  Questions or concerns regarding recovery period addressed at this time.   Reviewed any PCP or specialists progress notes/labs/radiology reports if applicable and addressed any questions or concerns. Patient denied any questions or concerns and states she is doing very well.

## 2023-07-28 LAB
C REACTIVE PROTEIN (MG/L) IN SER/PLAS: 0.14 MG/DL
SEDIMENTATION RATE, ERYTHROCYTE: 5 MM/H (ref 0–30)

## 2023-08-09 ENCOUNTER — PATIENT OUTREACH (OUTPATIENT)
Dept: PRIMARY CARE | Facility: CLINIC | Age: 78
End: 2023-08-09
Payer: MEDICARE

## 2023-08-09 DIAGNOSIS — N17.9 ACUTE KIDNEY INJURY (CMS-HCC): ICD-10-CM

## 2023-08-09 NOTE — PROGRESS NOTES
Unsuccessful outreach to this patient for the 90 day post discharge outreach. Left a voice-mail message including my direct call back number for the patient to call regarding any questions or concerns. Patient has met target of no readmissions for 90 days and has graduated from [facility]

## 2023-08-10 LAB
ALANINE AMINOTRANSFERASE (SGPT) (U/L) IN SER/PLAS: 12 U/L (ref 7–45)
ALBUMIN (G/DL) IN SER/PLAS: 4.2 G/DL (ref 3.4–5)
ALKALINE PHOSPHATASE (U/L) IN SER/PLAS: 63 U/L (ref 33–136)
ANION GAP IN SER/PLAS: 14 MMOL/L (ref 10–20)
ASPARTATE AMINOTRANSFERASE (SGOT) (U/L) IN SER/PLAS: 16 U/L (ref 9–39)
BILIRUBIN TOTAL (MG/DL) IN SER/PLAS: 0.5 MG/DL (ref 0–1.2)
CALCIUM (MG/DL) IN SER/PLAS: 9.2 MG/DL (ref 8.6–10.3)
CARBON DIOXIDE, TOTAL (MMOL/L) IN SER/PLAS: 26 MMOL/L (ref 21–32)
CHLORIDE (MMOL/L) IN SER/PLAS: 106 MMOL/L (ref 98–107)
CHOLESTEROL (MG/DL) IN SER/PLAS: 182 MG/DL (ref 0–199)
CHOLESTEROL IN HDL (MG/DL) IN SER/PLAS: 56.6 MG/DL
CHOLESTEROL/HDL RATIO: 3.2
CREATININE (MG/DL) IN SER/PLAS: 1.9 MG/DL (ref 0.5–1.05)
ERYTHROCYTE DISTRIBUTION WIDTH (RATIO) BY AUTOMATED COUNT: 12.9 % (ref 11.5–14.5)
ERYTHROCYTE MEAN CORPUSCULAR HEMOGLOBIN CONCENTRATION (G/DL) BY AUTOMATED: 30.4 G/DL (ref 32–36)
ERYTHROCYTE MEAN CORPUSCULAR VOLUME (FL) BY AUTOMATED COUNT: 96 FL (ref 80–100)
ERYTHROCYTES (10*6/UL) IN BLOOD BY AUTOMATED COUNT: 3.86 X10E12/L (ref 4–5.2)
GFR FEMALE: 27 ML/MIN/1.73M2
GLUCOSE (MG/DL) IN SER/PLAS: 102 MG/DL (ref 74–99)
HEMATOCRIT (%) IN BLOOD BY AUTOMATED COUNT: 37.2 % (ref 36–46)
HEMOGLOBIN (G/DL) IN BLOOD: 11.3 G/DL (ref 12–16)
LDL: 100 MG/DL (ref 0–99)
LEUKOCYTES (10*3/UL) IN BLOOD BY AUTOMATED COUNT: 6.8 X10E9/L (ref 4.4–11.3)
PLATELETS (10*3/UL) IN BLOOD AUTOMATED COUNT: 286 X10E9/L (ref 150–450)
POTASSIUM (MMOL/L) IN SER/PLAS: 4.5 MMOL/L (ref 3.5–5.3)
PROTEIN TOTAL: 6.4 G/DL (ref 6.4–8.2)
SODIUM (MMOL/L) IN SER/PLAS: 141 MMOL/L (ref 136–145)
TRIGLYCERIDE (MG/DL) IN SER/PLAS: 126 MG/DL (ref 0–149)
UREA NITROGEN (MG/DL) IN SER/PLAS: 32 MG/DL (ref 6–23)
VLDL: 25 MG/DL (ref 0–40)

## 2023-08-15 ENCOUNTER — OFFICE VISIT (OUTPATIENT)
Dept: PRIMARY CARE | Facility: CLINIC | Age: 78
End: 2023-08-15
Payer: MEDICARE

## 2023-08-15 VITALS
WEIGHT: 135.2 LBS | RESPIRATION RATE: 18 BRPM | SYSTOLIC BLOOD PRESSURE: 112 MMHG | BODY MASS INDEX: 26.55 KG/M2 | OXYGEN SATURATION: 97 % | HEART RATE: 62 BPM | HEIGHT: 60 IN | DIASTOLIC BLOOD PRESSURE: 52 MMHG

## 2023-08-15 DIAGNOSIS — I10 BENIGN ESSENTIAL HYPERTENSION: Primary | ICD-10-CM

## 2023-08-15 DIAGNOSIS — J01.10 ACUTE NON-RECURRENT FRONTAL SINUSITIS: ICD-10-CM

## 2023-08-15 PROCEDURE — 1159F MED LIST DOCD IN RCRD: CPT | Performed by: NURSE PRACTITIONER

## 2023-08-15 PROCEDURE — 1036F TOBACCO NON-USER: CPT | Performed by: NURSE PRACTITIONER

## 2023-08-15 PROCEDURE — 1157F ADVNC CARE PLAN IN RCRD: CPT | Performed by: NURSE PRACTITIONER

## 2023-08-15 PROCEDURE — 3078F DIAST BP <80 MM HG: CPT | Performed by: NURSE PRACTITIONER

## 2023-08-15 PROCEDURE — 1160F RVW MEDS BY RX/DR IN RCRD: CPT | Performed by: NURSE PRACTITIONER

## 2023-08-15 PROCEDURE — 99214 OFFICE O/P EST MOD 30 MIN: CPT | Performed by: NURSE PRACTITIONER

## 2023-08-15 PROCEDURE — 3074F SYST BP LT 130 MM HG: CPT | Performed by: NURSE PRACTITIONER

## 2023-08-15 PROCEDURE — 1126F AMNT PAIN NOTED NONE PRSNT: CPT | Performed by: NURSE PRACTITIONER

## 2023-08-15 RX ORDER — METHYLPREDNISOLONE 4 MG/1
TABLET ORAL
Qty: 21 TABLET | Refills: 0 | Status: SHIPPED | OUTPATIENT
Start: 2023-08-15 | End: 2023-08-22

## 2023-08-15 RX ORDER — AMOXICILLIN AND CLAVULANATE POTASSIUM 875; 125 MG/1; MG/1
875 TABLET, FILM COATED ORAL 2 TIMES DAILY
Qty: 14 TABLET | Refills: 0 | Status: SHIPPED | OUTPATIENT
Start: 2023-08-15 | End: 2023-08-22

## 2023-08-15 RX ORDER — HYOSCYAMINE SULFATE 0.125 MG
0.12 TABLET ORAL EVERY 4 HOURS PRN
COMMUNITY
End: 2024-03-19 | Stop reason: WASHOUT

## 2023-08-15 RX ORDER — AMLODIPINE BESYLATE 10 MG/1
10 TABLET ORAL DAILY
Qty: 90 TABLET | Refills: 3 | Status: SHIPPED
Start: 2023-08-15 | End: 2024-04-23 | Stop reason: SDUPTHER

## 2023-08-15 RX ORDER — PROMETHAZINE HYDROCHLORIDE AND DEXTROMETHORPHAN HYDROBROMIDE 6.25; 15 MG/5ML; MG/5ML
5 SYRUP ORAL 2 TIMES DAILY PRN
Qty: 100 ML | Refills: 0 | Status: SHIPPED | OUTPATIENT
Start: 2023-08-15 | End: 2023-08-22

## 2023-08-15 NOTE — PATIENT INSTRUCTIONS
Blood pressure is good    I sent in antibiotics to help with your infection  I sent in steroids to help open up your sinuses  I sent in cough syrup - may make drowsy

## 2023-08-15 NOTE — PROGRESS NOTES
Discharge Facility: [facility]  Discharge Diagnosis: SHEKHAR on CKD; Segmental Colitis  Admission Date: 5/8/23  Discharge Date: 5/9/23    PCP Appointment Date: 5/22/23  Specialist Appointment Date: GI- waiting to schedule colonoscopy  Hospital Encounter and Summary: Linked   See discharge assessment below for further details   No data recorded   Subjective   Patient ID: Vicky Govea is a 78 y.o. female who presents for Follow-up (Patient in today for routine F/U and to discuss productive cough x 2 weeks. ).    Presents for follow up    Has had productive cough x 2 weeks.  Productive of green sputum.  No fever.  Cough worse at night.  Has tried cordicin cough and cold with mild relief.  Cough is keeping her awake at night   has had bronchitis.      Hypertension  Patient is here for follow-up of elevated blood pressure. She is exercising and is adherent to a low-salt diet. Blood pressure is well controlled at home. Cardiac symptoms: none. Patient denies chest pressure/discomfort, exertional chest pressure/discomfort, irregular heart beat, orthopnea, palpitations, and syncope. Cardiovascular risk factors: advanced age (older than 55 for men, 65 for women) and hypertension. Use of agents associated with hypertension: none. History of target organ damage: none.             Review of Systems   All other systems reviewed and are negative.      Objective   /52   Pulse 62   Resp 18   Ht 1.524 m (5')   Wt 61.3 kg (135 lb 3.2 oz)   SpO2 97%   BMI 26.40 kg/m²     Physical Exam  Vitals and nursing note reviewed.   Constitutional:       General: She is not in acute distress.     Appearance: Normal appearance.   HENT:      Head: Normocephalic and atraumatic.      Right Ear: Tympanic membrane, ear canal and external ear normal.      Left Ear: Tympanic membrane, ear canal and external ear normal.      Nose:      Right Sinus: Maxillary sinus tenderness present.      Left Sinus: Maxillary sinus tenderness present.       Mouth/Throat:      Mouth: Mucous membranes are moist.      Pharynx: Oropharynx is clear.   Eyes:      Extraocular Movements: Extraocular movements intact.      Conjunctiva/sclera: Conjunctivae normal.      Pupils: Pupils are equal, round, and reactive to light.   Neck:      Vascular: No carotid bruit.   Cardiovascular:      Rate and Rhythm: Normal rate and regular rhythm.      Pulses: Normal pulses.      Heart sounds: Normal heart sounds.   Pulmonary:      Effort: Pulmonary effort is normal.      Breath sounds: Normal breath sounds.   Musculoskeletal:      Cervical back: Normal range of motion.   Lymphadenopathy:      Cervical: No cervical adenopathy.   Neurological:      General: No focal deficit present.      Mental Status: She is alert and oriented to person, place, and time. Mental status is at baseline.   Psychiatric:         Mood and Affect: Mood normal.         Behavior: Behavior normal.         Thought Content: Thought content normal.         Judgment: Judgment normal.         Assessment/Plan   Problem List Items Addressed This Visit       Benign essential hypertension - Primary     Stable  Follows nephrology  Continue amlodipine   Follow up in 6 months  Labs reviewed          Relevant Medications    amLODIPine (Norvasc) 10 mg tablet    Acute non-recurrent frontal sinusitis     - augmentin twice a day 7 days  -  Nasal saline, increase fluids  -  hand hygiene and infection prevention discussed  -  Warm compress to sinuses  - humidification  - recommend to eat yogurt twice daily while taking antibiotic or a daily probiotic  -medrol dose pack  - phenergan cough syrup at bedtime          Relevant Medications    amoxicillin-pot clavulanate (Augmentin) 875-125 mg tablet    methylPREDNISolone (Medrol Dospak) 4 mg tablets    promethazine-DM (Phenergan-DM) 6.25-15 mg/5 mL syrup    Angina pectoris (CMS/HCC)    Relevant Medications    amLODIPine (Norvasc) 10 mg tablet

## 2023-08-16 PROBLEM — Z09 HOSPITAL DISCHARGE FOLLOW-UP: Status: RESOLVED | Noted: 2023-05-22 | Resolved: 2023-08-16

## 2023-08-16 PROBLEM — R07.81 RIB PAIN ON RIGHT SIDE: Status: RESOLVED | Noted: 2023-05-22 | Resolved: 2023-08-16

## 2023-08-16 PROBLEM — J01.10 ACUTE NON-RECURRENT FRONTAL SINUSITIS: Status: ACTIVE | Noted: 2023-08-16

## 2023-08-16 PROBLEM — I20.9 ANGINA PECTORIS (CMS-HCC): Status: ACTIVE | Noted: 2023-08-16

## 2023-08-16 NOTE — ASSESSMENT & PLAN NOTE
- augmentin twice a day 7 days  -  Nasal saline, increase fluids  -  hand hygiene and infection prevention discussed  -  Warm compress to sinuses  - humidification  - recommend to eat yogurt twice daily while taking antibiotic or a daily probiotic  -medrol dose pack  - phenergan cough syrup at bedtime

## 2023-09-12 PROBLEM — L21.9 SEBORRHEIC DERMATITIS, UNSPECIFIED: Status: ACTIVE | Noted: 2021-09-02

## 2023-09-12 PROBLEM — M54.9 BACK PAIN: Status: ACTIVE | Noted: 2023-09-12

## 2023-09-12 PROBLEM — M25.551 CHRONIC PAIN OF BOTH HIPS: Status: ACTIVE | Noted: 2023-09-12

## 2023-09-12 PROBLEM — T50.8X5A ALLERGIC REACTION TO CONTRAST DYE: Status: ACTIVE | Noted: 2023-09-12

## 2023-09-12 PROBLEM — R10.13 CHRONIC EPIGASTRIC PAIN: Status: ACTIVE | Noted: 2023-09-12

## 2023-09-12 PROBLEM — D22.5 MELANOCYTIC NEVI OF TRUNK: Status: ACTIVE | Noted: 2021-09-02

## 2023-09-12 PROBLEM — L63.9 ALOPECIA AREATA, UNSPECIFIED: Status: ACTIVE | Noted: 2021-09-02

## 2023-09-12 PROBLEM — K86.9 PANCREATIC LESION (HHS-HCC): Status: ACTIVE | Noted: 2023-09-12

## 2023-09-12 PROBLEM — D22.70 MELANOCYTIC NEVI OF UNSPECIFIED LOWER LIMB, INCLUDING HIP: Status: ACTIVE | Noted: 2021-09-02

## 2023-09-12 PROBLEM — K29.70 GASTRITIS: Status: ACTIVE | Noted: 2023-09-12

## 2023-09-12 PROBLEM — L85.3 XEROSIS CUTIS: Status: ACTIVE | Noted: 2021-09-02

## 2023-09-12 PROBLEM — K52.9 COLITIS: Status: ACTIVE | Noted: 2023-09-12

## 2023-09-12 PROBLEM — K21.9 GERD (GASTROESOPHAGEAL REFLUX DISEASE): Status: ACTIVE | Noted: 2023-09-12

## 2023-09-12 PROBLEM — G89.29 CHRONIC PAIN OF BOTH HIPS: Status: ACTIVE | Noted: 2023-09-12

## 2023-09-12 PROBLEM — D48.5 NEOPLASM OF UNCERTAIN BEHAVIOR OF SKIN: Status: ACTIVE | Noted: 2021-09-02

## 2023-09-12 PROBLEM — B35.1 TINEA UNGUIUM: Status: ACTIVE | Noted: 2021-09-02

## 2023-09-12 PROBLEM — D22.60 MELANOCYTIC NEVI OF UNSPECIFIED UPPER LIMB, INCLUDING SHOULDER: Status: ACTIVE | Noted: 2021-09-02

## 2023-09-12 PROBLEM — M70.60 GREATER TROCHANTERIC BURSITIS: Status: ACTIVE | Noted: 2023-09-12

## 2023-09-12 PROBLEM — M85.80 OSTEOPENIA: Status: ACTIVE | Noted: 2023-09-12

## 2023-09-12 PROBLEM — K13.0 CHEILITIS: Status: ACTIVE | Noted: 2021-09-02

## 2023-09-12 PROBLEM — L82.0 INFLAMED SEBORRHEIC KERATOSIS: Status: ACTIVE | Noted: 2021-09-02

## 2023-09-12 PROBLEM — G89.29 CHRONIC EPIGASTRIC PAIN: Status: ACTIVE | Noted: 2023-09-12

## 2023-09-12 PROBLEM — N95.1 MENOPAUSAL SYMPTOM: Status: ACTIVE | Noted: 2023-09-12

## 2023-09-12 PROBLEM — N76.1 SUBACUTE VAGINITIS: Status: ACTIVE | Noted: 2023-09-12

## 2023-09-12 PROBLEM — L90.5 SCAR CONDITION AND FIBROSIS OF SKIN: Status: ACTIVE | Noted: 2021-09-02

## 2023-09-12 PROBLEM — L57.0 ACTINIC KERATOSIS: Status: ACTIVE | Noted: 2021-09-02

## 2023-09-12 PROBLEM — L71.9 ROSACEA: Status: ACTIVE | Noted: 2023-09-12

## 2023-09-12 PROBLEM — G47.00 INSOMNIA: Status: ACTIVE | Noted: 2023-09-12

## 2023-09-12 PROBLEM — L81.4 LENTIGINES: Status: ACTIVE | Noted: 2021-09-02

## 2023-09-12 PROBLEM — M25.552 CHRONIC PAIN OF BOTH HIPS: Status: ACTIVE | Noted: 2023-09-12

## 2023-09-12 PROBLEM — J44.9 COPD (CHRONIC OBSTRUCTIVE PULMONARY DISEASE) (MULTI): Status: ACTIVE | Noted: 2023-09-12

## 2023-09-12 PROBLEM — D18.01 HEMANGIOMA OF SKIN AND SUBCUTANEOUS TISSUE: Status: ACTIVE | Noted: 2021-09-02

## 2023-09-12 RX ORDER — FENOFIBRATE 54 MG/1
TABLET ORAL
COMMUNITY
End: 2024-03-11 | Stop reason: WASHOUT

## 2023-09-12 RX ORDER — KETOCONAZOLE 20 MG/G
1 CREAM TOPICAL
COMMUNITY
Start: 2015-02-17 | End: 2024-03-11 | Stop reason: WASHOUT

## 2023-09-12 RX ORDER — MIRABEGRON 25 MG/1
TABLET, FILM COATED, EXTENDED RELEASE ORAL
COMMUNITY
End: 2024-03-11 | Stop reason: WASHOUT

## 2023-09-12 RX ORDER — AMMONIUM LACTATE 12 G/100G
1 CREAM TOPICAL
COMMUNITY
Start: 2019-06-04 | End: 2024-03-11 | Stop reason: WASHOUT

## 2023-09-12 RX ORDER — CICLOPIROX 80 MG/ML
1 SOLUTION TOPICAL
COMMUNITY
Start: 2018-12-04 | End: 2024-03-11 | Stop reason: WASHOUT

## 2023-09-12 RX ORDER — MOMETASONE FUROATE 1 MG/G
CREAM TOPICAL 2 TIMES DAILY
COMMUNITY
End: 2024-03-11 | Stop reason: WASHOUT

## 2023-09-12 RX ORDER — RIFAXIMIN 550 MG/1
TABLET ORAL
COMMUNITY
Start: 2023-08-05 | End: 2024-04-16

## 2023-09-19 DIAGNOSIS — I63.9 CEREBRAL INFARCTION, UNSPECIFIED MECHANISM (MULTI): ICD-10-CM

## 2023-09-19 RX ORDER — ATORVASTATIN CALCIUM 10 MG/1
10 TABLET, FILM COATED ORAL DAILY
Qty: 90 TABLET | Refills: 1 | Status: SHIPPED | OUTPATIENT
Start: 2023-09-19 | End: 2024-03-18

## 2023-09-27 ENCOUNTER — HOSPITAL ENCOUNTER (OUTPATIENT)
Dept: DATA CONVERSION | Facility: HOSPITAL | Age: 78
Discharge: HOME | End: 2023-09-27
Payer: MEDICARE

## 2023-09-27 DIAGNOSIS — Z12.31 ENCOUNTER FOR SCREENING MAMMOGRAM FOR MALIGNANT NEOPLASM OF BREAST: ICD-10-CM

## 2023-10-04 ENCOUNTER — OFFICE VISIT (OUTPATIENT)
Dept: GASTROENTEROLOGY | Facility: CLINIC | Age: 78
End: 2023-10-04
Payer: MEDICARE

## 2023-10-04 VITALS — BODY MASS INDEX: 25.13 KG/M2 | HEART RATE: 82 BPM | WEIGHT: 128 LBS | HEIGHT: 60 IN

## 2023-10-04 DIAGNOSIS — K21.9 GASTROESOPHAGEAL REFLUX DISEASE, UNSPECIFIED WHETHER ESOPHAGITIS PRESENT: Primary | ICD-10-CM

## 2023-10-04 DIAGNOSIS — K58.0 IRRITABLE BOWEL SYNDROME WITH DIARRHEA: ICD-10-CM

## 2023-10-04 PROCEDURE — 1160F RVW MEDS BY RX/DR IN RCRD: CPT

## 2023-10-04 PROCEDURE — 1036F TOBACCO NON-USER: CPT

## 2023-10-04 PROCEDURE — 1126F AMNT PAIN NOTED NONE PRSNT: CPT

## 2023-10-04 PROCEDURE — 99213 OFFICE O/P EST LOW 20 MIN: CPT

## 2023-10-04 PROCEDURE — 1159F MED LIST DOCD IN RCRD: CPT

## 2023-10-04 ASSESSMENT — ENCOUNTER SYMPTOMS
ABDOMINAL PAIN: 0
VOMITING: 0
LOSS OF SENSATION IN FEET: 0
CHILLS: 0
BLOOD IN STOOL: 0
RECTAL PAIN: 0
OCCASIONAL FEELINGS OF UNSTEADINESS: 0
CONSTIPATION: 0
COUGH: 0
ANAL BLEEDING: 0
DIARRHEA: 0
ABDOMINAL DISTENTION: 0
DEPRESSION: 0
FEVER: 0
APPETITE CHANGE: 0
FATIGUE: 0
SHORTNESS OF BREATH: 0
TROUBLE SWALLOWING: 0
NAUSEA: 0

## 2023-10-04 NOTE — PATIENT INSTRUCTIONS
Please continue to take Protonix 30-60 minutes before a meal 1-2 times daily   You may try Align probiotic  You may refill Xifaxan if diarrhea recurs    Follow up annually or as needed
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
## 2023-10-06 ENCOUNTER — SPECIALTY PHARMACY (OUTPATIENT)
Dept: PHARMACY | Facility: CLINIC | Age: 78
End: 2023-10-06

## 2023-10-06 ENCOUNTER — OFFICE VISIT (OUTPATIENT)
Dept: PRIMARY CARE | Facility: CLINIC | Age: 78
End: 2023-10-06
Payer: MEDICARE

## 2023-10-06 ENCOUNTER — PHARMACY VISIT (OUTPATIENT)
Dept: PHARMACY | Facility: CLINIC | Age: 78
End: 2023-10-06
Payer: COMMERCIAL

## 2023-10-06 VITALS
HEART RATE: 75 BPM | BODY MASS INDEX: 25.19 KG/M2 | SYSTOLIC BLOOD PRESSURE: 132 MMHG | DIASTOLIC BLOOD PRESSURE: 50 MMHG | OXYGEN SATURATION: 97 % | WEIGHT: 129 LBS

## 2023-10-06 DIAGNOSIS — I63.9 CEREBRAL INFARCTION, UNSPECIFIED MECHANISM (MULTI): ICD-10-CM

## 2023-10-06 DIAGNOSIS — J44.1 CHRONIC OBSTRUCTIVE PULMONARY DISEASE WITH ACUTE EXACERBATION (MULTI): ICD-10-CM

## 2023-10-06 DIAGNOSIS — I10 BENIGN ESSENTIAL HYPERTENSION: Primary | ICD-10-CM

## 2023-10-06 PROCEDURE — 3075F SYST BP GE 130 - 139MM HG: CPT

## 2023-10-06 PROCEDURE — 1159F MED LIST DOCD IN RCRD: CPT

## 2023-10-06 PROCEDURE — 1126F AMNT PAIN NOTED NONE PRSNT: CPT

## 2023-10-06 PROCEDURE — 99214 OFFICE O/P EST MOD 30 MIN: CPT

## 2023-10-06 PROCEDURE — RXMED WILLOW AMBULATORY MEDICATION CHARGE

## 2023-10-06 PROCEDURE — 3078F DIAST BP <80 MM HG: CPT

## 2023-10-06 PROCEDURE — 1160F RVW MEDS BY RX/DR IN RCRD: CPT

## 2023-10-06 PROCEDURE — 1036F TOBACCO NON-USER: CPT

## 2023-10-06 RX ORDER — LISINOPRIL 10 MG/1
5 TABLET ORAL 2 TIMES DAILY
Qty: 90 TABLET | Refills: 3 | Status: SHIPPED
Start: 2023-10-06 | End: 2023-12-07 | Stop reason: SDUPTHER

## 2023-10-06 RX ORDER — CLOPIDOGREL BISULFATE 75 MG/1
75 TABLET ORAL DAILY
Qty: 90 TABLET | Refills: 3 | Status: SHIPPED
Start: 2023-10-06 | End: 2024-04-15 | Stop reason: SDUPTHER

## 2023-10-06 ASSESSMENT — PATIENT HEALTH QUESTIONNAIRE - PHQ9
SUM OF ALL RESPONSES TO PHQ9 QUESTIONS 1 AND 2: 0
1. LITTLE INTEREST OR PLEASURE IN DOING THINGS: NOT AT ALL
2. FEELING DOWN, DEPRESSED OR HOPELESS: NOT AT ALL

## 2023-10-06 ASSESSMENT — ENCOUNTER SYMPTOMS
PALPITATIONS: 0
PND: 0
SHORTNESS OF BREATH: 1
HEADACHES: 0
BLURRED VISION: 0
ABDOMINAL PAIN: 0
SORE THROAT: 1
LEG PAIN: 0
SPUTUM PRODUCTION: 1
HYPERTENSION: 1
FEVER: 0
VOMITING: 0
WHEEZING: 1
RHINORRHEA: 0

## 2023-10-06 ASSESSMENT — COPD QUESTIONNAIRES: COPD: 1

## 2023-10-06 NOTE — PROGRESS NOTES
Subjective   Patient ID: Vicky Govea is a 78 y.o. female who presents for Follow-up (Still SOB .).    Also requesting refill for long standing plavix had a stroke in 2010. Doing well no residual. Does still follow with neurology sees them every other year has complete block of one carotid.     Shortness of Breath  This is a recurrent problem. The current episode started 1 to 4 weeks ago. The problem occurs daily. The problem has been unchanged. Associated symptoms include a sore throat, sputum production and wheezing. Pertinent negatives include no abdominal pain, chest pain, fever, headaches, leg pain, leg swelling, PND, rhinorrhea or vomiting. The symptoms are aggravated by exercise. The patient has no known risk factors for DVT/PE. She has tried beta agonist inhalers for the symptoms. The treatment provided mild relief. Her past medical history is significant for bronchiolitis and COPD.   Hypertension  This is a chronic problem. The current episode started more than 1 year ago. The problem has been waxing and waning since onset. The problem is controlled. Associated symptoms include shortness of breath. Pertinent negatives include no anxiety, blurred vision, chest pain, headaches, palpitations, peripheral edema or PND. Risk factors for coronary artery disease include post-menopausal state. Past treatments include ACE inhibitors and diuretics. The current treatment provides significant improvement. There are no compliance problems.         Review of Systems   Constitutional:  Negative for fever.   HENT:  Positive for sore throat. Negative for rhinorrhea.    Eyes:  Negative for blurred vision.   Respiratory:  Positive for sputum production, shortness of breath and wheezing.    Cardiovascular:  Negative for chest pain, palpitations, leg swelling and PND.   Gastrointestinal:  Negative for abdominal pain and vomiting.   Neurological:  Negative for headaches.       Objective   /50   Pulse 75   Wt 58.5 kg  (129 lb)   SpO2 97%   BMI 25.19 kg/m²     Physical Exam  Vitals and nursing note reviewed.   Constitutional:       Appearance: Normal appearance. She is normal weight.   Eyes:      Extraocular Movements: Extraocular movements intact.      Pupils: Pupils are equal, round, and reactive to light.   Cardiovascular:      Pulses: Normal pulses.      Heart sounds: Normal heart sounds.   Pulmonary:      Effort: Pulmonary effort is normal.      Breath sounds: Normal breath sounds.      Comments: Complaint of sob feeling like she is just not taking a complete breath since her most recent bronchitis infection.   Neurological:      Mental Status: She is alert.      Motor: No weakness.      Gait: Gait normal.         Assessment/Plan   Problem List Items Addressed This Visit             ICD-10-CM    Benign essential hypertension - Primary I10    Relevant Medications    lisinopril 10 mg tablet    Cerebral infarction (CMS/[facility]) I63.9    Relevant Medications    clopidogrel (Plavix) 75 mg tablet    COPD (chronic obstructive pulmonary disease) (CMS/[facility]) J44.9   Gave sample of trellegy for 2 week period, advise return to clinic if shortness of breath does not improve.

## 2023-10-06 NOTE — PROGRESS NOTES
Patient address: [id number] [address] [address]  Patient Medications: (For current medications to populate, please use .CMED smart link)  Medication delivery date: 10.09.2023    Patient called in to setup delivery for her Xifaxan medication 14DS.

## 2023-10-10 ENCOUNTER — TELEPHONE (OUTPATIENT)
Dept: PRIMARY CARE | Facility: CLINIC | Age: 78
End: 2023-10-10
Payer: MEDICARE

## 2023-10-10 NOTE — TELEPHONE ENCOUNTER
Radha from [facility] calling about a refill on pt's lisinopril being sent in for 1/2 tab one daily vs the full tab once daily the pt has been on. Would just like a call to confirm the change [phone] reference # : [id number]

## 2023-10-23 ENCOUNTER — TELEPHONE (OUTPATIENT)
Dept: PRIMARY CARE | Facility: CLINIC | Age: 78
End: 2023-10-23
Payer: MEDICARE

## 2023-10-23 DIAGNOSIS — G47.00 INSOMNIA, UNSPECIFIED TYPE: ICD-10-CM

## 2023-10-23 RX ORDER — AMITRIPTYLINE HYDROCHLORIDE 25 MG/1
25 TABLET, FILM COATED ORAL NIGHTLY PRN
Qty: 30 TABLET | Refills: 1 | Status: SHIPPED | OUTPATIENT
Start: 2023-10-23 | End: 2023-11-28

## 2023-10-23 NOTE — TELEPHONE ENCOUNTER
Vicky called asking for refill of Amitriptyline 25mg tablets be sent to [facility] mail order pharmacy.  She has not refilled in a while but would like it because it helps her sleep. I tried to call her to get more information but she is not home at the moment. Her  said she would be home after 4pm.    Last seen October 6th by Sheyla Reyes and has checkup scheduled with Sheyla on 11-28-23

## 2023-10-27 ENCOUNTER — ANCILLARY PROCEDURE (OUTPATIENT)
Dept: RADIOLOGY | Facility: HOSPITAL | Age: 78
End: 2023-10-27
Payer: MEDICARE

## 2023-10-27 DIAGNOSIS — R30.0 DYSURIA: ICD-10-CM

## 2023-10-27 PROCEDURE — 76770 US EXAM ABDO BACK WALL COMP: CPT

## 2023-10-27 PROCEDURE — 76770 US EXAM ABDO BACK WALL COMP: CPT | Performed by: STUDENT IN AN ORGANIZED HEALTH CARE EDUCATION/TRAINING PROGRAM

## 2023-11-03 ENCOUNTER — SPECIALTY PHARMACY (OUTPATIENT)
Dept: PHARMACY | Facility: CLINIC | Age: 78
End: 2023-11-03

## 2023-11-03 ENCOUNTER — PHARMACY VISIT (OUTPATIENT)
Dept: PHARMACY | Facility: CLINIC | Age: 78
End: 2023-11-03
Payer: COMMERCIAL

## 2023-11-28 ENCOUNTER — ANCILLARY PROCEDURE (OUTPATIENT)
Dept: RADIOLOGY | Facility: CLINIC | Age: 78
End: 2023-11-28
Payer: MEDICARE

## 2023-11-28 ENCOUNTER — OFFICE VISIT (OUTPATIENT)
Dept: PRIMARY CARE | Facility: CLINIC | Age: 78
End: 2023-11-28
Payer: MEDICARE

## 2023-11-28 VITALS
DIASTOLIC BLOOD PRESSURE: 70 MMHG | SYSTOLIC BLOOD PRESSURE: 147 MMHG | HEIGHT: 60 IN | WEIGHT: 131 LBS | HEART RATE: 76 BPM | OXYGEN SATURATION: 96 % | BODY MASS INDEX: 25.72 KG/M2

## 2023-11-28 DIAGNOSIS — F51.01 PRIMARY INSOMNIA: ICD-10-CM

## 2023-11-28 DIAGNOSIS — M25.562 LEFT KNEE PAIN, UNSPECIFIED CHRONICITY: ICD-10-CM

## 2023-11-28 DIAGNOSIS — Z78.0 ASYMPTOMATIC MENOPAUSAL STATE: ICD-10-CM

## 2023-11-28 DIAGNOSIS — Z00.00 ROUTINE GENERAL MEDICAL EXAMINATION AT HEALTH CARE FACILITY: Primary | ICD-10-CM

## 2023-11-28 PROCEDURE — 73562 X-RAY EXAM OF KNEE 3: CPT | Mod: LEFT SIDE | Performed by: RADIOLOGY

## 2023-11-28 PROCEDURE — 1159F MED LIST DOCD IN RCRD: CPT

## 2023-11-28 PROCEDURE — 1170F FXNL STATUS ASSESSED: CPT

## 2023-11-28 PROCEDURE — 1126F AMNT PAIN NOTED NONE PRSNT: CPT

## 2023-11-28 PROCEDURE — 3077F SYST BP >= 140 MM HG: CPT

## 2023-11-28 PROCEDURE — 1160F RVW MEDS BY RX/DR IN RCRD: CPT

## 2023-11-28 PROCEDURE — 1036F TOBACCO NON-USER: CPT

## 2023-11-28 PROCEDURE — G0439 PPPS, SUBSEQ VISIT: HCPCS

## 2023-11-28 PROCEDURE — 3078F DIAST BP <80 MM HG: CPT

## 2023-11-28 PROCEDURE — 73564 X-RAY EXAM KNEE 4 OR MORE: CPT | Mod: LT,FY

## 2023-11-28 PROCEDURE — 73564 X-RAY EXAM KNEE 4 OR MORE: CPT | Mod: LEFT SIDE | Performed by: RADIOLOGY

## 2023-11-28 RX ORDER — TRAZODONE HYDROCHLORIDE 50 MG/1
50 TABLET ORAL NIGHTLY PRN
Qty: 30 TABLET | Refills: 0 | Status: SHIPPED | OUTPATIENT
Start: 2023-11-28 | End: 2024-02-15 | Stop reason: ALTCHOICE

## 2023-11-28 ASSESSMENT — ENCOUNTER SYMPTOMS
BACK PAIN: 0
DYSURIA: 0
INSOMNIA: 1
FEVER: 0
CONSTIPATION: 0
FATIGUE: 0
MYALGIAS: 1
NAUSEA: 0
SINUS PRESSURE: 0
PALPITATIONS: 0
FACIAL ASYMMETRY: 0
WHEEZING: 0
JOINT SWELLING: 0
WOUND: 0
SORE THROAT: 0
SHORTNESS OF BREATH: 0
HEMATURIA: 0
UNEXPECTED WEIGHT CHANGE: 0
COUGH: 0
LIGHT-HEADEDNESS: 0
RHINORRHEA: 0
ABDOMINAL PAIN: 0
TROUBLE SWALLOWING: 0
NUMBNESS: 0
SEIZURES: 0
WEAKNESS: 0
ARTHRALGIAS: 0
SINUS PAIN: 0

## 2023-11-28 ASSESSMENT — ACTIVITIES OF DAILY LIVING (ADL)
JUDGMENT_ADEQUATE_SAFELY_COMPLETE_DAILY_ACTIVITIES: YES
GROCERY SHOPPING: INDEPENDENT
DRESSING: INDEPENDENT
PREPARING MEALS: INDEPENDENT
USING TRANSPORTATION: INDEPENDENT
FEEDING: INDEPENDENT
NEEDS ASSISTANCE WITH FOOD: INDEPENDENT
PILL BOX USED: YES
STIL DRIVING: YES
TOILETING: INDEPENDENT
DOING HOUSEWORK: INDEPENDENT
USING TELEPHONE: INDEPENDENT
ADEQUATE_TO_COMPLETE_ADL: YES
EATING: INDEPENDENT
BATHING: INDEPENDENT
MANAGING FINANCES: INDEPENDENT
TAKING MEDICATION: INDEPENDENT

## 2023-11-28 ASSESSMENT — PATIENT HEALTH QUESTIONNAIRE - PHQ9
2. FEELING DOWN, DEPRESSED OR HOPELESS: NOT AT ALL
SUM OF ALL RESPONSES TO PHQ9 QUESTIONS 1 AND 2: 0
1. LITTLE INTEREST OR PLEASURE IN DOING THINGS: NOT AT ALL

## 2023-11-28 NOTE — PROGRESS NOTES
Subjective   Reason for Visit: Vicky Govea is an 78 y.o. female here for a Medicare Wellness visit.     Past Medical, Surgical, and Family History reviewed and updated in chart.    Reviewed all medications by prescribing practitioner or clinical pharmacist (such as prescriptions, OTCs, herbal therapies and supplements) and documented in the medical record.    Insomnia  This is a chronic problem. The current episode started more than 1 year ago. The problem occurs daily. The problem has been unchanged. Associated symptoms include myalgias and urinary symptoms. Pertinent negatives include no abdominal pain, arthralgias, chest pain, congestion, coughing, fatigue, fever, joint swelling, nausea, numbness, rash, sore throat or weakness. Exacerbated by: hip  pain. Treatments tried: elavil. The treatment provided mild relief.       Patient Care Team:  JOHN Mireles as PCP - General  JOHN Mireles as Primary Care Provider     Review of Systems   Constitutional:  Negative for fatigue, fever and unexpected weight change.   HENT:  Negative for congestion, ear discharge, ear pain, nosebleeds, postnasal drip, rhinorrhea, sinus pressure, sinus pain, sneezing, sore throat and trouble swallowing.    Respiratory:  Negative for cough, shortness of breath and wheezing.    Cardiovascular:  Negative for chest pain, palpitations and leg swelling.   Gastrointestinal:  Negative for abdominal pain, constipation and nausea.   Genitourinary:  Negative for dysuria, hematuria, menstrual problem, pelvic pain, vaginal bleeding and vaginal pain.   Musculoskeletal:  Positive for myalgias. Negative for arthralgias, back pain, gait problem and joint swelling.   Skin:  Negative for rash and wound.   Neurological:  Negative for seizures, facial asymmetry, weakness, light-headedness and numbness.   Psychiatric/Behavioral:  The patient has insomnia.        Objective   Vitals:  /70   Pulse 76   Ht 1.524 m (5')   Wt  59.4 kg (131 lb)   SpO2 96%   BMI 25.58 kg/m²       Physical Exam  Vitals and nursing note reviewed.   Constitutional:       General: She is not in acute distress.     Appearance: She is normal weight. She is not ill-appearing or diaphoretic.   Cardiovascular:      Pulses: Normal pulses.      Heart sounds: Normal heart sounds.   Pulmonary:      Effort: Pulmonary effort is normal.      Breath sounds: Normal breath sounds.   Musculoskeletal:         General: Tenderness present.      Left knee: Swelling present. Tenderness present.         Assessment/Plan   Problem List Items Addressed This Visit       Insomnia    Relevant Medications    traZODone (Desyrel) 50 mg tablet     Other Visit Diagnoses       Routine general medical examination at health care facility    -  Primary    Asymptomatic menopausal state        Relevant Orders    XR DEXA bone density    Left knee pain, unspecified chronicity        Relevant Orders    US MSK lower extremity joints tendons muscles    Referral to Physical Therapy    XR knee left 3 views

## 2023-12-04 ENCOUNTER — ANCILLARY PROCEDURE (OUTPATIENT)
Dept: RADIOLOGY | Facility: HOSPITAL | Age: 78
End: 2023-12-04
Payer: MEDICARE

## 2023-12-04 DIAGNOSIS — M25.562 LEFT KNEE PAIN, UNSPECIFIED CHRONICITY: ICD-10-CM

## 2023-12-04 PROCEDURE — 76882 US LMTD JT/FCL EVL NVASC XTR: CPT | Performed by: RADIOLOGY

## 2023-12-04 PROCEDURE — 76882 US LMTD JT/FCL EVL NVASC XTR: CPT

## 2023-12-05 ENCOUNTER — TELEPHONE (OUTPATIENT)
Dept: RADIOLOGY | Facility: HOSPITAL | Age: 78
End: 2023-12-05

## 2023-12-05 ENCOUNTER — OFFICE VISIT (OUTPATIENT)
Dept: PAIN MEDICINE | Facility: CLINIC | Age: 78
End: 2023-12-05
Payer: MEDICARE

## 2023-12-05 VITALS
BODY MASS INDEX: 25.72 KG/M2 | WEIGHT: 131 LBS | HEART RATE: 90 BPM | SYSTOLIC BLOOD PRESSURE: 175 MMHG | HEIGHT: 60 IN | DIASTOLIC BLOOD PRESSURE: 75 MMHG | RESPIRATION RATE: 16 BRPM

## 2023-12-05 DIAGNOSIS — M46.1 SACROILIITIS (CMS-HCC): Primary | ICD-10-CM

## 2023-12-05 PROCEDURE — 99214 OFFICE O/P EST MOD 30 MIN: CPT | Performed by: PHYSICAL MEDICINE & REHABILITATION

## 2023-12-05 PROCEDURE — 1125F AMNT PAIN NOTED PAIN PRSNT: CPT | Performed by: PHYSICAL MEDICINE & REHABILITATION

## 2023-12-05 PROCEDURE — 1159F MED LIST DOCD IN RCRD: CPT | Performed by: PHYSICAL MEDICINE & REHABILITATION

## 2023-12-05 PROCEDURE — 1036F TOBACCO NON-USER: CPT | Performed by: PHYSICAL MEDICINE & REHABILITATION

## 2023-12-05 PROCEDURE — 3077F SYST BP >= 140 MM HG: CPT | Performed by: PHYSICAL MEDICINE & REHABILITATION

## 2023-12-05 PROCEDURE — 1160F RVW MEDS BY RX/DR IN RCRD: CPT | Performed by: PHYSICAL MEDICINE & REHABILITATION

## 2023-12-05 PROCEDURE — 3078F DIAST BP <80 MM HG: CPT | Performed by: PHYSICAL MEDICINE & REHABILITATION

## 2023-12-05 RX ORDER — GABAPENTIN 300 MG/1
300 CAPSULE ORAL NIGHTLY
Qty: 30 CAPSULE | Refills: 2 | Status: SHIPPED | OUTPATIENT
Start: 2023-12-05

## 2023-12-05 ASSESSMENT — PAIN SCALES - GENERAL: PAINLEVEL: 6
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
## 2023-12-05 NOTE — ASSESSMENT & PLAN NOTE
Very pleasant 78-year-old female with left-sided sacroiliac joint pain.  She received nearly 100% pain relief for 4 to 5 months from previous SI joint injection and has had a return of her pain.  At this point it is reasonable to repeat the injection.  Our plan for today:  - Repeat left sacroiliac joint injection.  Discussed risk benefits and alternatives and patient would like to proceed.  Patient is on Plavix but she does not need to hold that medication for this procedure.  - We will restart gabapentin 300 mg at night.  - Patient to continue with her home exercise program.

## 2023-12-06 PROCEDURE — RXMED WILLOW AMBULATORY MEDICATION CHARGE

## 2023-12-07 ENCOUNTER — OFFICE VISIT (OUTPATIENT)
Dept: PRIMARY CARE | Facility: CLINIC | Age: 78
End: 2023-12-07
Payer: MEDICARE

## 2023-12-07 ENCOUNTER — PHARMACY VISIT (OUTPATIENT)
Dept: PHARMACY | Facility: CLINIC | Age: 78
End: 2023-12-07
Payer: COMMERCIAL

## 2023-12-07 VITALS
SYSTOLIC BLOOD PRESSURE: 139 MMHG | HEART RATE: 74 BPM | BODY MASS INDEX: 25.72 KG/M2 | DIASTOLIC BLOOD PRESSURE: 61 MMHG | HEIGHT: 60 IN | OXYGEN SATURATION: 97 % | WEIGHT: 131 LBS

## 2023-12-07 DIAGNOSIS — I10 BENIGN ESSENTIAL HYPERTENSION: ICD-10-CM

## 2023-12-07 PROCEDURE — 3078F DIAST BP <80 MM HG: CPT

## 2023-12-07 PROCEDURE — 3075F SYST BP GE 130 - 139MM HG: CPT

## 2023-12-07 PROCEDURE — 1036F TOBACCO NON-USER: CPT

## 2023-12-07 PROCEDURE — 1159F MED LIST DOCD IN RCRD: CPT

## 2023-12-07 PROCEDURE — 1125F AMNT PAIN NOTED PAIN PRSNT: CPT

## 2023-12-07 PROCEDURE — 99213 OFFICE O/P EST LOW 20 MIN: CPT

## 2023-12-07 PROCEDURE — 1160F RVW MEDS BY RX/DR IN RCRD: CPT

## 2023-12-07 RX ORDER — LISINOPRIL 5 MG/1
5 TABLET ORAL 2 TIMES DAILY
Qty: 180 TABLET | Refills: 3 | Status: SHIPPED | OUTPATIENT
Start: 2023-12-07 | End: 2024-04-15 | Stop reason: SDUPTHER

## 2023-12-07 RX ORDER — LISINOPRIL 5 MG/1
5 TABLET ORAL 2 TIMES DAILY
Qty: 180 TABLET | Refills: 3 | Status: SHIPPED | OUTPATIENT
Start: 2023-12-07 | End: 2023-12-07 | Stop reason: SDUPTHER

## 2023-12-07 ASSESSMENT — ENCOUNTER SYMPTOMS
APPETITE CHANGE: 0
HYPERTENSION: 1
JOINT SWELLING: 0
SHORTNESS OF BREATH: 0
PALPITATIONS: 0
CHILLS: 0
FEVER: 0
FATIGUE: 0
COUGH: 0

## 2023-12-07 NOTE — PROGRESS NOTES
Subjective   Patient ID: Vicky Govea is a 78 y.o. female who presents for Foot Swelling (Right Foot and ankle swelling X 5 days).    Edema    Presents with new edema. Episode onset: 5days ago. The onset of the episode was sudden. These episodes happen in the evening. The problem presents itself constantly. The problem has been gradually worsening. The edema is present on the both side(s). Risk factors for edema include no known risk factors.   Associated agents include no associated agents.   Pertinent negative symptoms include no chest pain, no cough, no fatigue, no fever and no palpitations.   Hypertension  This is a chronic problem. The current episode started more than 1 year ago. The problem has been waxing and waning since onset. The problem is controlled. Associated symptoms include peripheral edema. Pertinent negatives include no anxiety, chest pain, malaise/fatigue, palpitations or shortness of breath.        Review of Systems   Constitutional:  Negative for appetite change, chills, fatigue, fever and malaise/fatigue.   Respiratory:  Negative for cough and shortness of breath.    Cardiovascular:  Positive for leg swelling. Negative for chest pain and palpitations.   Musculoskeletal:  Negative for gait problem and joint swelling.       Objective   /61   Pulse 74   Ht 1.524 m (5')   Wt 59.4 kg (131 lb)   SpO2 97%   BMI 25.58 kg/m²     Physical Exam  Constitutional:       General: She is not in acute distress.     Appearance: Normal appearance. She is normal weight. She is not ill-appearing.   Cardiovascular:      Pulses: Normal pulses.      Heart sounds: Normal heart sounds.   Pulmonary:      Effort: Pulmonary effort is normal.      Breath sounds: Normal breath sounds.   Musculoskeletal:      Right lower le+ Pitting Edema present.      Left lower le+ Pitting Edema present.   Neurological:      Mental Status: She is alert.         Assessment/Plan   Problem List Items Addressed This Visit              ICD-10-CM    Benign essential hypertension I10    Relevant Medications    lisinopril 5 mg tablet

## 2023-12-08 ENCOUNTER — HOSPITAL ENCOUNTER (OUTPATIENT)
Dept: RADIOLOGY | Facility: HOSPITAL | Age: 78
Discharge: HOME | End: 2023-12-08
Payer: MEDICARE

## 2023-12-08 ENCOUNTER — ANCILLARY ORDERS (OUTPATIENT)
Dept: PRIMARY CARE | Facility: CLINIC | Age: 78
End: 2023-12-08

## 2023-12-08 DIAGNOSIS — M81.0 AGE-RELATED OSTEOPOROSIS WITHOUT CURRENT PATHOLOGICAL FRACTURE: ICD-10-CM

## 2023-12-08 DIAGNOSIS — F51.01 PRIMARY INSOMNIA: ICD-10-CM

## 2023-12-08 DIAGNOSIS — Z78.0 ASYMPTOMATIC MENOPAUSAL STATE: ICD-10-CM

## 2023-12-08 DIAGNOSIS — M25.562 LEFT KNEE PAIN, UNSPECIFIED CHRONICITY: ICD-10-CM

## 2023-12-08 DIAGNOSIS — Z00.00 ROUTINE GENERAL MEDICAL EXAMINATION AT HEALTH CARE FACILITY: Primary | ICD-10-CM

## 2023-12-08 PROCEDURE — 77085 DXA BONE DENSITY AXL VRT FX: CPT

## 2023-12-08 PROCEDURE — 77085 DXA BONE DENSITY AXL VRT FX: CPT | Performed by: STUDENT IN AN ORGANIZED HEALTH CARE EDUCATION/TRAINING PROGRAM

## 2023-12-12 PROBLEM — M81.0 AGE-RELATED OSTEOPOROSIS WITHOUT CURRENT PATHOLOGICAL FRACTURE: Status: ACTIVE | Noted: 2023-12-12

## 2023-12-12 RX ORDER — ALENDRONATE SODIUM 70 MG/1
70 TABLET ORAL
Qty: 4 TABLET | Refills: 11 | Status: SHIPPED | OUTPATIENT
Start: 2023-12-12 | End: 2024-02-15

## 2023-12-20 ENCOUNTER — ANCILLARY PROCEDURE (OUTPATIENT)
Dept: RADIOLOGY | Facility: CLINIC | Age: 78
End: 2023-12-20
Payer: MEDICARE

## 2023-12-20 ENCOUNTER — HOSPITAL ENCOUNTER (OUTPATIENT)
Dept: OPERATING ROOM | Facility: CLINIC | Age: 78
Setting detail: OUTPATIENT SURGERY
Discharge: HOME | End: 2023-12-20
Payer: MEDICARE

## 2023-12-20 ENCOUNTER — APPOINTMENT (OUTPATIENT)
Dept: PHYSICAL THERAPY | Facility: CLINIC | Age: 78
End: 2023-12-20
Payer: MEDICARE

## 2023-12-20 VITALS
OXYGEN SATURATION: 97 % | WEIGHT: 132.06 LBS | BODY MASS INDEX: 25.93 KG/M2 | SYSTOLIC BLOOD PRESSURE: 132 MMHG | HEART RATE: 67 BPM | HEIGHT: 60 IN | RESPIRATION RATE: 16 BRPM | DIASTOLIC BLOOD PRESSURE: 60 MMHG | TEMPERATURE: 97.5 F

## 2023-12-20 DIAGNOSIS — M46.1 SACROILIITIS (CMS-HCC): ICD-10-CM

## 2023-12-20 PROCEDURE — 2500000002 HC RX 250 W HCPCS SELF ADMINISTERED DRUGS (ALT 637 FOR MEDICARE OP, ALT 636 FOR OP/ED): Mod: MUE | Performed by: STUDENT IN AN ORGANIZED HEALTH CARE EDUCATION/TRAINING PROGRAM

## 2023-12-20 PROCEDURE — 2500000004 HC RX 250 GENERAL PHARMACY W/ HCPCS (ALT 636 FOR OP/ED): Performed by: PHYSICAL MEDICINE & REHABILITATION

## 2023-12-20 PROCEDURE — 27096 INJECT SACROILIAC JOINT: CPT

## 2023-12-20 PROCEDURE — 2500000005 HC RX 250 GENERAL PHARMACY W/O HCPCS: Performed by: PHYSICAL MEDICINE & REHABILITATION

## 2023-12-20 PROCEDURE — 94760 N-INVAS EAR/PLS OXIMETRY 1: CPT

## 2023-12-20 PROCEDURE — G0260 INJ FOR SACROILIAC JT ANESTH: HCPCS

## 2023-12-20 PROCEDURE — 27096 INJECT SACROILIAC JOINT: CPT | Performed by: PHYSICAL MEDICINE & REHABILITATION

## 2023-12-20 PROCEDURE — 77003 FLUOROGUIDE FOR SPINE INJECT: CPT

## 2023-12-20 RX ORDER — METHYLPREDNISOLONE ACETATE 40 MG/ML
INJECTION, SUSPENSION INTRA-ARTICULAR; INTRALESIONAL; INTRAMUSCULAR; SOFT TISSUE AS NEEDED
Status: COMPLETED | OUTPATIENT
Start: 2023-12-20 | End: 2023-12-20

## 2023-12-20 RX ORDER — DIAZEPAM 5 MG/1
5 TABLET ORAL ONCE
Status: COMPLETED | OUTPATIENT
Start: 2023-12-20 | End: 2023-12-20

## 2023-12-20 RX ORDER — DEXAMETHASONE SODIUM PHOSPHATE 100 MG/10ML
INJECTION INTRAMUSCULAR; INTRAVENOUS AS NEEDED
Status: COMPLETED | OUTPATIENT
Start: 2023-12-20 | End: 2023-12-20

## 2023-12-20 RX ORDER — LIDOCAINE HYDROCHLORIDE 5 MG/ML
INJECTION, SOLUTION INFILTRATION; PERINEURAL AS NEEDED
Status: COMPLETED | OUTPATIENT
Start: 2023-12-20 | End: 2023-12-20

## 2023-12-20 RX ADMIN — LIDOCAINE HYDROCHLORIDE 5 ML: 5 INJECTION, SOLUTION INFILTRATION; PERINEURAL at 09:21

## 2023-12-20 RX ADMIN — DIAZEPAM 5 MG: 5 TABLET ORAL at 08:50

## 2023-12-20 RX ADMIN — METHYLPREDNISOLONE ACETATE 40 MG: 40 INJECTION, SUSPENSION INTRA-ARTICULAR; INTRALESIONAL; INTRAMUSCULAR; SOFT TISSUE at 09:23

## 2023-12-20 ASSESSMENT — PAIN SCALES - GENERAL
PAINLEVEL_OUTOF10: 0 - NO PAIN
PAINLEVEL_OUTOF10: 0 - NO PAIN
PAINLEVEL_OUTOF10: 2

## 2023-12-20 ASSESSMENT — COLUMBIA-SUICIDE SEVERITY RATING SCALE - C-SSRS
2. HAVE YOU ACTUALLY HAD ANY THOUGHTS OF KILLING YOURSELF?: NO
6. HAVE YOU EVER DONE ANYTHING, STARTED TO DO ANYTHING, OR PREPARED TO DO ANYTHING TO END YOUR LIFE?: NO
1. IN THE PAST MONTH, HAVE YOU WISHED YOU WERE DEAD OR WISHED YOU COULD GO TO SLEEP AND NOT WAKE UP?: NO

## 2023-12-20 ASSESSMENT — PAIN - FUNCTIONAL ASSESSMENT: PAIN_FUNCTIONAL_ASSESSMENT: 0-10

## 2023-12-20 ASSESSMENT — PAIN DESCRIPTION - DESCRIPTORS: DESCRIPTORS: ACHING

## 2023-12-20 NOTE — PROCEDURES
Joint Aspiration/Injection    Date/Time: 12/20/2023 9:22 AM    Performed by: Parker Sotelo MD  Authorized by: Parker Sotelo MD    Consent:     Consent obtained:  Written    Consent given by:  Patient    Risks, benefits, and alternatives were discussed: yes      Risks discussed:  Bleeding, infection, pain and nerve damage    Alternatives discussed:  No treatment, delayed treatment and alternative treatment  Universal protocol:     Procedure explained and questions answered to patient or proxy's satisfaction: yes      Relevant documents present and verified: yes      Test results available: yes      Imaging studies available: yes      Required blood products, implants, devices, and special equipment available: yes      Site/side marked: yes      Immediately prior to procedure, a time out was called: yes      Patient identity confirmed:  Verbally with patient, hospital-assigned identification number and arm band  Comments:      SI joint injection    Following informed consent, the patient was operating room and placed in the prone position. The low back area was prepped and draped with chlorhexidine in sterile fashion.  Using fluoroscopic guidance, the skin and subcutaneous tissue overlying needle trajectory to the lower aspect of the sacroiliac joint was anesthetized with 3 cc of 0.5% Lidocaine.  A 22-gauge spinal needle was then advanced under fluoroscopic guidance the lower aspect of the sacroiliac joint.  No contrast was injected as patient does have a contrast allergy.  Thereafter the below medications were injected and the needle was removed.  The patient was then transferred to the recovery room in stable condition.    The patient is to continue with physical therapy/home exercises and update us on response/progress.    Laterality: Left  Medication(s): 1 mL [0.5% lidocaine] [40 mg methylprednisolone] divided between site(s)

## 2023-12-20 NOTE — INTERVAL H&P NOTE
H&P reviewed. The patient was examined and there are no changes to the H&P.    Will plan on repeating left-sided sacroiliac joint injection given excellent control of pain previously.  Patient taking Plavix, overall low risk of bleeding while on antiplatelet medication for this specific procedure.    Risks, benefits and alternatives of the procedure were discussed with the patient who expressed understanding and agrees to proceed.

## 2023-12-20 NOTE — DISCHARGE INSTRUCTIONS
Dr. Sotelo's discharge instructions reviewed with patient. She verbalized understanding, questions answered.

## 2023-12-28 ENCOUNTER — APPOINTMENT (OUTPATIENT)
Dept: PRIMARY CARE | Facility: CLINIC | Age: 78
End: 2023-12-28
Payer: MEDICARE

## 2024-02-13 ENCOUNTER — OFFICE VISIT (OUTPATIENT)
Dept: OPHTHALMOLOGY | Facility: CLINIC | Age: 79
End: 2024-02-13
Payer: MEDICARE

## 2024-02-13 DIAGNOSIS — H35.3132 INTERMEDIATE STAGE NONEXUDATIVE AGE-RELATED MACULAR DEGENERATION OF BOTH EYES: Primary | ICD-10-CM

## 2024-02-13 DIAGNOSIS — Z79.899 LONG-TERM USE OF PLAQUENIL: ICD-10-CM

## 2024-02-13 PROCEDURE — 92134 CPTRZ OPH DX IMG PST SGM RTA: CPT | Mod: BILATERAL PROCEDURE

## 2024-02-13 PROCEDURE — 99213 OFFICE O/P EST LOW 20 MIN: CPT

## 2024-02-13 ASSESSMENT — SLIT LAMP EXAM - LIDS
COMMENTS: GOOD POSITION, TRACE MGD
COMMENTS: GOOD POSITION, TRACE MGD

## 2024-02-13 ASSESSMENT — CONF VISUAL FIELD
OD_INFERIOR_TEMPORAL_RESTRICTION: 0
OS_NORMAL: 1
OD_SUPERIOR_NASAL_RESTRICTION: 0
OS_INFERIOR_NASAL_RESTRICTION: 0
OS_INFERIOR_TEMPORAL_RESTRICTION: 0
OD_SUPERIOR_TEMPORAL_RESTRICTION: 0
OD_NORMAL: 1
OS_SUPERIOR_NASAL_RESTRICTION: 0
OD_INFERIOR_NASAL_RESTRICTION: 0
OS_SUPERIOR_TEMPORAL_RESTRICTION: 0

## 2024-02-13 ASSESSMENT — ENCOUNTER SYMPTOMS
ENDOCRINE NEGATIVE: 0
CARDIOVASCULAR NEGATIVE: 0
HEMATOLOGIC/LYMPHATIC NEGATIVE: 0
CONSTITUTIONAL NEGATIVE: 0
RESPIRATORY NEGATIVE: 0
MUSCULOSKELETAL NEGATIVE: 0
EYES NEGATIVE: 0
NEUROLOGICAL NEGATIVE: 0
ALLERGIC/IMMUNOLOGIC NEGATIVE: 0
PSYCHIATRIC NEGATIVE: 0
GASTROINTESTINAL NEGATIVE: 0

## 2024-02-13 ASSESSMENT — VISUAL ACUITY
CORRECTION_TYPE: GLASSES
METHOD: SNELLEN - LINEAR
OD_CC+: -2
OS_CC: 20/40
OS_CC+: -1
OD_CC: 20/25

## 2024-02-13 ASSESSMENT — EXTERNAL EXAM - LEFT EYE: OS_EXAM: NORMAL

## 2024-02-13 ASSESSMENT — CUP TO DISC RATIO
OD_RATIO: .3
OS_RATIO: .3

## 2024-02-13 ASSESSMENT — TONOMETRY
OD_IOP_MMHG: 19
OS_IOP_MMHG: 19
IOP_METHOD: TONOPEN

## 2024-02-13 ASSESSMENT — EXTERNAL EXAM - RIGHT EYE: OD_EXAM: NORMAL

## 2024-02-13 NOTE — PROGRESS NOTES
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 No IRF or SRF    - Left eye (OS): Normal foveal contour, medium to large druse, intact outer retinal bands. No IRF or SRF    #Plan#:   1. AREDS 2 BID    2. Recommend UV protection, diet modification (eat green leafy vegetables, fish, lower meat consumption)  3. Amsler grid follow up  4. Observe  5. RTC in 6 months       Long-term use of aaimgxfbhaccnerrkjU54.899  - Duration: 8 years  - Dose: 200 mg/day  - No renal or liver disease  - No evidence of pre-existing maculopathy  - No visual complaints    - OCT: no signs of plaquenil toxicity    Impression:  - No evidence of hydroxychloroquine toxicity on exam today, will repeat testing in 12 months    SLE  - Observe  - RTC with optom

## 2024-02-15 ENCOUNTER — OFFICE VISIT (OUTPATIENT)
Dept: PRIMARY CARE | Facility: CLINIC | Age: 79
End: 2024-02-15
Payer: MEDICARE

## 2024-02-15 VITALS
OXYGEN SATURATION: 96 % | HEART RATE: 69 BPM | BODY MASS INDEX: 26.11 KG/M2 | HEIGHT: 60 IN | WEIGHT: 133 LBS | SYSTOLIC BLOOD PRESSURE: 148 MMHG | DIASTOLIC BLOOD PRESSURE: 62 MMHG

## 2024-02-15 DIAGNOSIS — Z01.818 PREOP EXAMINATION: Primary | ICD-10-CM

## 2024-02-15 DIAGNOSIS — M81.0 AGE-RELATED OSTEOPOROSIS WITHOUT CURRENT PATHOLOGICAL FRACTURE: Primary | ICD-10-CM

## 2024-02-15 PROCEDURE — 1036F TOBACCO NON-USER: CPT

## 2024-02-15 PROCEDURE — 3077F SYST BP >= 140 MM HG: CPT

## 2024-02-15 PROCEDURE — 1157F ADVNC CARE PLAN IN RCRD: CPT

## 2024-02-15 PROCEDURE — 3078F DIAST BP <80 MM HG: CPT

## 2024-02-15 PROCEDURE — 1160F RVW MEDS BY RX/DR IN RCRD: CPT

## 2024-02-15 PROCEDURE — 99213 OFFICE O/P EST LOW 20 MIN: CPT

## 2024-02-15 PROCEDURE — 1159F MED LIST DOCD IN RCRD: CPT

## 2024-02-15 PROCEDURE — 1125F AMNT PAIN NOTED PAIN PRSNT: CPT

## 2024-02-15 RX ORDER — DENOSUMAB 60 MG/ML
60 INJECTION SUBCUTANEOUS
Qty: 1 ML | Refills: 3 | Status: SHIPPED | OUTPATIENT
Start: 2024-02-15 | End: 2024-04-16

## 2024-02-15 ASSESSMENT — ENCOUNTER SYMPTOMS
SINUS PAIN: 0
FACIAL ASYMMETRY: 0
FEVER: 0
TROUBLE SWALLOWING: 0
PSYCHIATRIC NEGATIVE: 1
UNEXPECTED WEIGHT CHANGE: 0
FATIGUE: 0
WEAKNESS: 0
SINUS PRESSURE: 0
COUGH: 0
LIGHT-HEADEDNESS: 0
WHEEZING: 0
WOUND: 0
SHORTNESS OF BREATH: 0
SORE THROAT: 0
RHINORRHEA: 0
PALPITATIONS: 0

## 2024-02-15 NOTE — PROGRESS NOTES
Subjective   Patient ID: Vicky Govea is a 79 y.o. female who presents for Pre-op Exam (Surgery clearance [facility] 2/27///67).    Pre-Op Evaluation  Vicky Govea is a 79 y.o. female who presents to the office today for a preoperative consultation at the request of surgeon Dr Ellis who plans on performing upper eye lid repair on February 27. This consultation is requested for the specific conditions prompting preoperative evaluation (i.e. because of potential affect on operative risk): general health. Planned anesthesia is IV sedation. The patient has the following known anesthesia issues: none. Patient has a bleeding risk of: taking plavix will be held for 3 days. Patient does not have objections to receiving blood products if needed.                  Review of Systems   Constitutional:  Negative for fatigue, fever and unexpected weight change.   HENT:  Negative for congestion, ear discharge, ear pain, nosebleeds, postnasal drip, rhinorrhea, sinus pressure, sinus pain, sneezing, sore throat and trouble swallowing.    Respiratory:  Negative for cough, shortness of breath and wheezing.    Cardiovascular:  Negative for chest pain, palpitations and leg swelling.   Skin:  Negative for rash and wound.   Neurological:  Negative for facial asymmetry, weakness and light-headedness.   Psychiatric/Behavioral: Negative.         Objective   Pulse 69   Ht 1.524 m (5')   Wt 60.3 kg (133 lb)   SpO2 96%   BMI 25.97 kg/m²     Physical Exam  Vitals and nursing note reviewed.   Constitutional:       Appearance: Normal appearance.   Eyes:      Extraocular Movements: Extraocular movements intact.      Conjunctiva/sclera: Conjunctivae normal.      Pupils: Pupils are equal, round, and reactive to light.   Cardiovascular:      Pulses: Normal pulses.      Heart sounds: Normal heart sounds.   Pulmonary:      Effort: Pulmonary effort is normal.      Breath sounds: Normal breath sounds.   Neurological:      General: No  focal deficit present.      Mental Status: She is alert and oriented to person, place, and time.      Cranial Nerves: No cranial nerve deficit.      Sensory: No sensory deficit.      Motor: No weakness.      Gait: Gait normal.   Psychiatric:         Mood and Affect: Mood normal.         Behavior: Behavior normal.         Thought Content: Thought content normal.         Judgment: Judgment normal.         Assessment/Plan   Problem List Items Addressed This Visit    None  Visit Diagnoses         Codes    Preop examination    -  Primary Z01.818        Pt cleared from side of primary care for eyelid surgery.

## 2024-03-05 ENCOUNTER — APPOINTMENT (OUTPATIENT)
Dept: PRIMARY CARE | Facility: CLINIC | Age: 79
End: 2024-03-05
Payer: MEDICARE

## 2024-03-11 ENCOUNTER — OFFICE VISIT (OUTPATIENT)
Dept: PRIMARY CARE | Facility: CLINIC | Age: 79
End: 2024-03-11
Payer: MEDICARE

## 2024-03-11 ENCOUNTER — HOSPITAL ENCOUNTER (OUTPATIENT)
Dept: RADIOLOGY | Facility: CLINIC | Age: 79
Discharge: HOME | End: 2024-03-11
Payer: MEDICARE

## 2024-03-11 VITALS
SYSTOLIC BLOOD PRESSURE: 161 MMHG | OXYGEN SATURATION: 98 % | BODY MASS INDEX: 25.32 KG/M2 | WEIGHT: 129 LBS | HEIGHT: 60 IN | HEART RATE: 74 BPM | DIASTOLIC BLOOD PRESSURE: 66 MMHG

## 2024-03-11 DIAGNOSIS — R05.2 SUBACUTE COUGH: ICD-10-CM

## 2024-03-11 DIAGNOSIS — R05.2 SUBACUTE COUGH: Primary | ICD-10-CM

## 2024-03-11 DIAGNOSIS — H65.93 FLUID LEVEL BEHIND TYMPANIC MEMBRANE OF BOTH EARS: ICD-10-CM

## 2024-03-11 PROCEDURE — 3078F DIAST BP <80 MM HG: CPT | Performed by: NURSE PRACTITIONER

## 2024-03-11 PROCEDURE — 1159F MED LIST DOCD IN RCRD: CPT | Performed by: NURSE PRACTITIONER

## 2024-03-11 PROCEDURE — 1125F AMNT PAIN NOTED PAIN PRSNT: CPT | Performed by: NURSE PRACTITIONER

## 2024-03-11 PROCEDURE — 71046 X-RAY EXAM CHEST 2 VIEWS: CPT

## 2024-03-11 PROCEDURE — 99214 OFFICE O/P EST MOD 30 MIN: CPT | Performed by: NURSE PRACTITIONER

## 2024-03-11 PROCEDURE — 1157F ADVNC CARE PLAN IN RCRD: CPT | Performed by: NURSE PRACTITIONER

## 2024-03-11 PROCEDURE — 71046 X-RAY EXAM CHEST 2 VIEWS: CPT | Performed by: STUDENT IN AN ORGANIZED HEALTH CARE EDUCATION/TRAINING PROGRAM

## 2024-03-11 PROCEDURE — 1036F TOBACCO NON-USER: CPT | Performed by: NURSE PRACTITIONER

## 2024-03-11 PROCEDURE — 3077F SYST BP >= 140 MM HG: CPT | Performed by: NURSE PRACTITIONER

## 2024-03-11 PROCEDURE — 1160F RVW MEDS BY RX/DR IN RCRD: CPT | Performed by: NURSE PRACTITIONER

## 2024-03-11 NOTE — PROGRESS NOTES
"Subjective   Patient ID: Vicky Govea is a 79 y.o. female who presents for Shortness of Breath (Patient mentioned for the last month and she had shortness of breath, sore throats, and sweating. Patient reports her shortness of breath has been getting worse. Patient has not taken any medication. ).    Vicky is a 79 year old female here today for SOB and \"chest pressure.\" Patient states that she had the \"flu\" 1 month ago followed by a cold a few days later. Since then, symptoms had lingered \"on and off.\" Patient states that she sometimes feels warm but does not have a fever, sore throat comes and goes, occasionally has trouble swallowing, right ear will \"pop\", and occasional dry cough. No nausea, vomiting, or diarrhea.            Review of Systems    Objective   /66   Pulse 74   Ht 1.524 m (5')   Wt 58.5 kg (129 lb)   LMP  (LMP Unknown)   SpO2 98%   BMI 25.19 kg/m²     Physical Exam  Constitutional:       Appearance: Normal appearance.   HENT:      Right Ear: Tympanic membrane normal.      Left Ear: Tympanic membrane normal.      Ears:      Comments: Bilateral ear canals slightly erythematous     Nose: Nose normal.      Mouth/Throat:      Pharynx: Posterior oropharyngeal erythema present.   Cardiovascular:      Rate and Rhythm: Normal rate and regular rhythm.   Pulmonary:      Effort: Pulmonary effort is normal.      Breath sounds: Normal breath sounds.   Neurological:      Mental Status: She is alert.         Assessment/Plan   Diagnoses and all orders for this visit:  Subacute cough  Comments:  Due to pnd. Flonase BID [id number] and pecid 20mg bid [id number]. CXR to make sure no pneumonia  Orders:  -     XR chest 2 views; Future  Fluid level behind tympanic membrane of both ears  Comments:  Flonase and pepcid will help         "

## 2024-03-18 DIAGNOSIS — I63.9 CEREBRAL INFARCTION, UNSPECIFIED MECHANISM (MULTI): ICD-10-CM

## 2024-03-18 RX ORDER — ATORVASTATIN CALCIUM 10 MG/1
10 TABLET, FILM COATED ORAL NIGHTLY
Qty: 90 TABLET | Refills: 1 | Status: SHIPPED | OUTPATIENT
Start: 2024-03-18

## 2024-03-19 DIAGNOSIS — K58.0 IRRITABLE BOWEL SYNDROME WITH DIARRHEA: ICD-10-CM

## 2024-03-19 RX ORDER — HYOSCYAMINE SULFATE 0.12 MG/1
0.12 TABLET, ORALLY DISINTEGRATING ORAL 3 TIMES DAILY PRN
Qty: 100 TABLET | Refills: 0 | Status: SHIPPED | OUTPATIENT
Start: 2024-03-19 | End: 2024-04-16 | Stop reason: ALTCHOICE

## 2024-03-25 ENCOUNTER — OFFICE VISIT (OUTPATIENT)
Dept: PRIMARY CARE | Facility: CLINIC | Age: 79
End: 2024-03-25
Payer: MEDICARE

## 2024-03-25 VITALS
SYSTOLIC BLOOD PRESSURE: 157 MMHG | OXYGEN SATURATION: 95 % | HEART RATE: 79 BPM | BODY MASS INDEX: 25.13 KG/M2 | HEIGHT: 60 IN | WEIGHT: 128 LBS | DIASTOLIC BLOOD PRESSURE: 76 MMHG

## 2024-03-25 DIAGNOSIS — J40 BRONCHITIS: Primary | ICD-10-CM

## 2024-03-25 DIAGNOSIS — N18.9 CHRONIC KIDNEY DISEASE, UNSPECIFIED CKD STAGE: ICD-10-CM

## 2024-03-25 PROCEDURE — 1158F ADVNC CARE PLAN TLK DOCD: CPT | Performed by: NURSE PRACTITIONER

## 2024-03-25 PROCEDURE — 1123F ACP DISCUSS/DSCN MKR DOCD: CPT | Performed by: NURSE PRACTITIONER

## 2024-03-25 PROCEDURE — 3077F SYST BP >= 140 MM HG: CPT | Performed by: NURSE PRACTITIONER

## 2024-03-25 PROCEDURE — 1036F TOBACCO NON-USER: CPT | Performed by: NURSE PRACTITIONER

## 2024-03-25 PROCEDURE — 1160F RVW MEDS BY RX/DR IN RCRD: CPT | Performed by: NURSE PRACTITIONER

## 2024-03-25 PROCEDURE — 1157F ADVNC CARE PLAN IN RCRD: CPT | Performed by: NURSE PRACTITIONER

## 2024-03-25 PROCEDURE — 3078F DIAST BP <80 MM HG: CPT | Performed by: NURSE PRACTITIONER

## 2024-03-25 PROCEDURE — 99213 OFFICE O/P EST LOW 20 MIN: CPT | Performed by: NURSE PRACTITIONER

## 2024-03-25 PROCEDURE — 1159F MED LIST DOCD IN RCRD: CPT | Performed by: NURSE PRACTITIONER

## 2024-03-25 RX ORDER — AMOXICILLIN AND CLAVULANATE POTASSIUM 500; 125 MG/1; MG/1
500 TABLET, FILM COATED ORAL 2 TIMES DAILY
Qty: 20 TABLET | Refills: 0 | Status: SHIPPED | OUTPATIENT
Start: 2024-03-25 | End: 2024-04-04

## 2024-03-25 ASSESSMENT — ENCOUNTER SYMPTOMS
LOSS OF SENSATION IN FEET: 0
OCCASIONAL FEELINGS OF UNSTEADINESS: 0
DEPRESSION: 0

## 2024-03-25 NOTE — PROGRESS NOTES
Subjective   Patient ID: Vicky Govea is a 79 y.o. female who presents for Cough (Patient mentioned she has had a ongoing cough and SOB along with a sore throat. Patient was seen a couple weeks ago and the over the counter meds did not help. ) and Sore Throat.    Patient is a 79 year old female here today for continued shortness of breath and cough. Tried pepcid and flonase after last appointment, which helped with the sinuses. Feels the nasal congestion is improved. Reason she is here today is that she started coughing up green sputum.   On 3/11 treated for viral upper respiratory symptoms. CXR obtained, without acute concern. Denies difficulty breathing, fever, chills, nausea, vomiting, diarrhea.          Review of Systems    Objective   /76   Pulse 79   Ht 1.524 m (5')   Wt 58.1 kg (128 lb)   LMP  (LMP Unknown)   SpO2 95%   BMI 25.00 kg/m²     Physical Exam  Vitals and nursing note reviewed.   Constitutional:       General: She is not in acute distress.     Appearance: Normal appearance. She is normal weight.   HENT:      Head: Normocephalic and atraumatic.      Right Ear: External ear normal.      Left Ear: External ear normal.      Ears:      Comments: Bilateral ear canals inflamed. TM intact, clear.      Nose: No congestion.      Mouth/Throat:      Mouth: Mucous membranes are moist.      Pharynx: Oropharynx is clear.   Eyes:      Extraocular Movements: Extraocular movements intact.      Conjunctiva/sclera: Conjunctivae normal.      Pupils: Pupils are equal, round, and reactive to light.   Neck:      Vascular: No carotid bruit.   Cardiovascular:      Rate and Rhythm: Normal rate and regular rhythm.      Pulses: Normal pulses.      Heart sounds: Normal heart sounds.   Pulmonary:      Effort: Pulmonary effort is normal.      Breath sounds: Normal breath sounds.   Musculoskeletal:      Cervical back: Normal range of motion and neck supple.   Lymphadenopathy:      Cervical: No cervical adenopathy.    Skin:     General: Skin is warm and dry.      Capillary Refill: Capillary refill takes less than 2 seconds.   Neurological:      Mental Status: She is alert and oriented to person, place, and time.   Psychiatric:         Mood and Affect: Mood normal.         Behavior: Behavior normal.         Thought Content: Thought content normal.         Judgment: Judgment normal.         Assessment/Plan   Diagnoses and all orders for this visit:  Bronchitis  Comments:  Augmentin 500mg po BID x10 days  Orders:  -     amoxicillin-pot clavulanate (Augmentin) 500-125 mg tablet; Take 1 tablet (500 mg) by mouth 2 times a day for 10 days.  Chronic kidney disease, unspecified CKD stage  Comments:  Most recent renal function reviewed, stable. Need updated lab with pcp as scheduled.       Dosing of antibiotic adjusted according to renal function. This was explained to patient.

## 2024-04-15 ENCOUNTER — APPOINTMENT (OUTPATIENT)
Dept: ORTHOPEDIC SURGERY | Facility: CLINIC | Age: 79
End: 2024-04-15
Payer: MEDICARE

## 2024-04-15 ENCOUNTER — OFFICE VISIT (OUTPATIENT)
Dept: PRIMARY CARE | Facility: CLINIC | Age: 79
End: 2024-04-15
Payer: MEDICARE

## 2024-04-15 ENCOUNTER — LAB (OUTPATIENT)
Dept: LAB | Facility: LAB | Age: 79
End: 2024-04-15
Payer: MEDICARE

## 2024-04-15 VITALS
RESPIRATION RATE: 18 BRPM | BODY MASS INDEX: 25.36 KG/M2 | HEIGHT: 60 IN | DIASTOLIC BLOOD PRESSURE: 60 MMHG | OXYGEN SATURATION: 97 % | SYSTOLIC BLOOD PRESSURE: 134 MMHG | HEART RATE: 79 BPM | WEIGHT: 129.2 LBS

## 2024-04-15 DIAGNOSIS — M46.1 SACROILIITIS (CMS-HCC): ICD-10-CM

## 2024-04-15 DIAGNOSIS — Z00.00 ROUTINE GENERAL MEDICAL EXAMINATION AT HEALTH CARE FACILITY: Primary | ICD-10-CM

## 2024-04-15 DIAGNOSIS — I10 BENIGN ESSENTIAL HYPERTENSION: ICD-10-CM

## 2024-04-15 DIAGNOSIS — Z12.31 SCREENING MAMMOGRAM, ENCOUNTER FOR: ICD-10-CM

## 2024-04-15 DIAGNOSIS — K50.011 CROHN'S DISEASE OF SMALL INTESTINE WITH RECTAL BLEEDING (MULTI): ICD-10-CM

## 2024-04-15 DIAGNOSIS — I63.9 CEREBRAL INFARCTION, UNSPECIFIED MECHANISM (MULTI): ICD-10-CM

## 2024-04-15 DIAGNOSIS — J44.1 CHRONIC OBSTRUCTIVE PULMONARY DISEASE WITH ACUTE EXACERBATION (MULTI): ICD-10-CM

## 2024-04-15 DIAGNOSIS — E78.2 MIXED HYPERLIPIDEMIA: ICD-10-CM

## 2024-04-15 DIAGNOSIS — K21.9 GASTROESOPHAGEAL REFLUX DISEASE WITHOUT ESOPHAGITIS: ICD-10-CM

## 2024-04-15 DIAGNOSIS — Z00.00 MEDICARE ANNUAL WELLNESS VISIT, SUBSEQUENT: ICD-10-CM

## 2024-04-15 DIAGNOSIS — M81.0 AGE-RELATED OSTEOPOROSIS WITHOUT CURRENT PATHOLOGICAL FRACTURE: ICD-10-CM

## 2024-04-15 LAB
ALBUMIN SERPL BCP-MCNC: 4.5 G/DL (ref 3.4–5)
ALP SERPL-CCNC: 82 U/L (ref 33–136)
ALT SERPL W P-5'-P-CCNC: 14 U/L (ref 7–45)
ANION GAP SERPL CALC-SCNC: 16 MMOL/L (ref 10–20)
AST SERPL W P-5'-P-CCNC: 21 U/L (ref 9–39)
BILIRUB SERPL-MCNC: 0.5 MG/DL (ref 0–1.2)
BUN SERPL-MCNC: 20 MG/DL (ref 6–23)
CALCIUM SERPL-MCNC: 9.6 MG/DL (ref 8.6–10.3)
CHLORIDE SERPL-SCNC: 103 MMOL/L (ref 98–107)
CHOLEST SERPL-MCNC: 201 MG/DL (ref 0–199)
CHOLESTEROL/HDL RATIO: 3.7
CO2 SERPL-SCNC: 27 MMOL/L (ref 21–32)
CREAT SERPL-MCNC: 1.1 MG/DL (ref 0.5–1.05)
EGFRCR SERPLBLD CKD-EPI 2021: 51 ML/MIN/1.73M*2
ERYTHROCYTE [DISTWIDTH] IN BLOOD BY AUTOMATED COUNT: 12.8 % (ref 11.5–14.5)
GLUCOSE SERPL-MCNC: 88 MG/DL (ref 74–99)
HCT VFR BLD AUTO: 39.7 % (ref 36–46)
HDLC SERPL-MCNC: 53.7 MG/DL
HGB BLD-MCNC: 12.2 G/DL (ref 12–16)
LDLC SERPL CALC-MCNC: 111 MG/DL
MCH RBC QN AUTO: 29.3 PG (ref 26–34)
MCHC RBC AUTO-ENTMCNC: 30.7 G/DL (ref 32–36)
MCV RBC AUTO: 95 FL (ref 80–100)
NON HDL CHOLESTEROL: 147 MG/DL (ref 0–149)
NRBC BLD-RTO: 0 /100 WBCS (ref 0–0)
PLATELET # BLD AUTO: 274 X10*3/UL (ref 150–450)
POTASSIUM SERPL-SCNC: 4.1 MMOL/L (ref 3.5–5.3)
PROT SERPL-MCNC: 7 G/DL (ref 6.4–8.2)
RBC # BLD AUTO: 4.16 X10*6/UL (ref 4–5.2)
SODIUM SERPL-SCNC: 142 MMOL/L (ref 136–145)
TRIGL SERPL-MCNC: 183 MG/DL (ref 0–149)
VLDL: 37 MG/DL (ref 0–40)
WBC # BLD AUTO: 6.3 X10*3/UL (ref 4.4–11.3)

## 2024-04-15 PROCEDURE — 3075F SYST BP GE 130 - 139MM HG: CPT | Performed by: NURSE PRACTITIONER

## 2024-04-15 PROCEDURE — 99397 PER PM REEVAL EST PAT 65+ YR: CPT | Performed by: NURSE PRACTITIONER

## 2024-04-15 PROCEDURE — 1160F RVW MEDS BY RX/DR IN RCRD: CPT | Performed by: NURSE PRACTITIONER

## 2024-04-15 PROCEDURE — 1157F ADVNC CARE PLAN IN RCRD: CPT | Performed by: NURSE PRACTITIONER

## 2024-04-15 PROCEDURE — 85027 COMPLETE CBC AUTOMATED: CPT

## 2024-04-15 PROCEDURE — 1158F ADVNC CARE PLAN TLK DOCD: CPT | Performed by: NURSE PRACTITIONER

## 2024-04-15 PROCEDURE — 80053 COMPREHEN METABOLIC PANEL: CPT

## 2024-04-15 PROCEDURE — 36415 COLL VENOUS BLD VENIPUNCTURE: CPT

## 2024-04-15 PROCEDURE — 1159F MED LIST DOCD IN RCRD: CPT | Performed by: NURSE PRACTITIONER

## 2024-04-15 PROCEDURE — 1170F FXNL STATUS ASSESSED: CPT | Performed by: NURSE PRACTITIONER

## 2024-04-15 PROCEDURE — 80061 LIPID PANEL: CPT

## 2024-04-15 PROCEDURE — 1036F TOBACCO NON-USER: CPT | Performed by: NURSE PRACTITIONER

## 2024-04-15 PROCEDURE — 1123F ACP DISCUSS/DSCN MKR DOCD: CPT | Performed by: NURSE PRACTITIONER

## 2024-04-15 PROCEDURE — 3078F DIAST BP <80 MM HG: CPT | Performed by: NURSE PRACTITIONER

## 2024-04-15 PROCEDURE — G0439 PPPS, SUBSEQ VISIT: HCPCS | Performed by: NURSE PRACTITIONER

## 2024-04-15 RX ORDER — CLOPIDOGREL BISULFATE 75 MG/1
75 TABLET ORAL DAILY
Qty: 90 TABLET | Refills: 3 | Status: SHIPPED | OUTPATIENT
Start: 2024-04-15 | End: 2025-04-15

## 2024-04-15 RX ORDER — PANTOPRAZOLE SODIUM 40 MG/1
40 TABLET, DELAYED RELEASE ORAL 2 TIMES DAILY
Qty: 180 TABLET | Refills: 3 | Status: SHIPPED | OUTPATIENT
Start: 2024-04-15 | End: 2025-04-15

## 2024-04-15 RX ORDER — LISINOPRIL 5 MG/1
5 TABLET ORAL 2 TIMES DAILY
Qty: 180 TABLET | Refills: 3 | Status: SHIPPED | OUTPATIENT
Start: 2024-04-15 | End: 2025-04-15

## 2024-04-15 ASSESSMENT — ENCOUNTER SYMPTOMS
LOSS OF SENSATION IN FEET: 0
OCCASIONAL FEELINGS OF UNSTEADINESS: 0
DEPRESSION: 0

## 2024-04-15 ASSESSMENT — PATIENT HEALTH QUESTIONNAIRE - PHQ9
1. LITTLE INTEREST OR PLEASURE IN DOING THINGS: NOT AT ALL
SUM OF ALL RESPONSES TO PHQ9 QUESTIONS 1 AND 2: 0
2. FEELING DOWN, DEPRESSED OR HOPELESS: NOT AT ALL

## 2024-04-15 ASSESSMENT — ACTIVITIES OF DAILY LIVING (ADL)
TAKING_MEDICATION: INDEPENDENT
DOING_HOUSEWORK: INDEPENDENT
GROCERY_SHOPPING: INDEPENDENT
BATHING: INDEPENDENT
MANAGING_FINANCES: INDEPENDENT
DRESSING: INDEPENDENT

## 2024-04-15 NOTE — PATIENT INSTRUCTIONS
We reviewed your bone density test - will need to be repeated in 2 years  I ref[facility] your [facility]  Have your labs done - I will look at your kidney numbers and let you know if they are good so that you know if you can take the steroid  I ordered your mammogram for Sept  Follow up in 6 months

## 2024-04-15 NOTE — PROGRESS NOTES
Subjective   Reason for Visit: Vicky Govea is an 79 y.o. female here for a Medicare Wellness visit.     Past Medical, Surgical, and Family History reviewed and updated in chart.         Presents for HTN follow up and AMW    Following nephrology for her CKD - due for labs  Following Dr. Ricks - taking plaquenil     Colonoscopy - 2023  Mammogram 9/23  Dexa 12/2023 - would like to review further.  She can't take fosamax due to her GERD and diarrhea.  She was ordered prolia but it was over 500 and can't afford that   Pneumonia vaccine 2015    Hypertension  Patient is here for follow-up of elevated blood pressure. She is exercising and is adherent to a low-salt diet. Blood pressure is well controlled at home. Cardiac symptoms: none. Patient denies chest pressure/discomfort, claudication, lower extremity edema, orthopnea, palpitations, and paroxysmal nocturnal dyspnea. Cardiovascular risk factors: advanced age (older than 55 for men, 65 for women) and hypertension. Use of agents associated with hypertension: none. History of target organ damage: none.          Patient Care Team:  NATE Melara-BRYAN as PCP - General  Jessica Prado DO as PCP - [employer] Medicare [employer]age PCP  JOHN Melara as Primary Care Provider     Review of Systems   All other systems reviewed and are negative.      Objective   Vitals:  /60   Pulse 79   Resp 18   Ht 1.524 m (5')   Wt 58.6 kg (129 lb 3.2 oz)   LMP  (LMP Unknown)   SpO2 97%   BMI 25.23 kg/m²       Physical Exam  Vitals and nursing note reviewed.   Constitutional:       General: She is not in acute distress.     Appearance: Normal appearance. She is normal weight.   HENT:      Head: Normocephalic and atraumatic.      Right Ear: Tympanic membrane, ear canal and external ear normal.      Left Ear: Tympanic membrane, ear canal and external ear normal.      Nose: Nose normal.      Mouth/Throat:      Mouth: Mucous membranes are moist.       Pharynx: Oropharynx is clear.   Eyes:      Extraocular Movements: Extraocular movements intact.      Conjunctiva/sclera: Conjunctivae normal.      Pupils: Pupils are equal, round, and reactive to light.   Neck:      Vascular: No carotid bruit.   Cardiovascular:      Rate and Rhythm: Normal rate and regular rhythm.      Pulses: Normal pulses.      Heart sounds: Normal heart sounds.   Pulmonary:      Effort: Pulmonary effort is normal.      Breath sounds: Normal breath sounds.   Abdominal:      General: Bowel sounds are normal. There is no distension.      Palpations: Abdomen is soft.      Tenderness: There is no abdominal tenderness.   Musculoskeletal:         General: Normal range of motion.      Cervical back: Normal range of motion and neck supple.      Right lower leg: No edema.      Left lower leg: No edema.   Lymphadenopathy:      Cervical: No cervical adenopathy.   Skin:     General: Skin is warm and dry.      Capillary Refill: Capillary refill takes less than 2 seconds.   Neurological:      General: No focal deficit present.      Mental Status: She is alert and oriented to person, place, and time. Mental status is at baseline.      Motor: No weakness.   Psychiatric:         Mood and Affect: Mood normal.         Behavior: Behavior normal.         Thought Content: Thought content normal.         Judgment: Judgment normal.         Assessment/Plan   Problem List Items Addressed This Visit       Benign essential hypertension    Overview     Follows nephrology  Continue amlodipine 10 mg daily  Continue atorvastatin 10 mg daily  Continue clopidogrel 75 mg daily   Continue hydrochlorothiazide 12.5mg daily   Continue lisinopril 5 mg daily  Continue magnesium 300 mg daily   Cbc,cmp, lipid   Follow up in 6 months         Current Assessment & Plan     Follows nephrology  Continue amlodipine 10 mg daily  Continue atorvastatin 10 mg daily  Continue clopidogrel 75 mg daily   Continue hydrochlorothiazide 12.5mg daily    Continue lisinopril 5 mg daily  Continue magnesium 300 mg daily   Cbc,cmp, lipid   Follow up in 6 months         Relevant Medications    lisinopril 5 mg tablet    Other Relevant Orders    Lipid Panel (Completed)    CBC (Completed)    Comprehensive Metabolic Panel (Completed)    Cerebral infarction (Multi)    Overview     Hx of CVA  Recommended statin - had myaglia with rosuvastatin  Would like to try another statin   Has been tolerating atorvastatin 10 mg  Lipid panel ordered            Current Assessment & Plan     Hx of CVA  Recommended statin - had myaglia with rosuvastatin  Would like to try another statin   Has been tolerating atorvastatin 10 mg  Lipid panel ordered          Relevant Medications    clopidogrel (Plavix) 75 mg tablet    RESOLVED: Esophageal reflux    Relevant Medications    pantoprazole (ProtoNix) 40 mg EC tablet    Hyperlipidemia    Overview     Follows nephrology  Continue amlodipine 10 mg daily  Continue atorvastatin 10 mg daily  Continue clopidogrel 75 mg daily   Continue hydrochlorothiazide 12.5mg daily   Continue lisinopril 5 mg daily  Continue magnesium 300 mg daily   Cbc,cmp, lipid   Follow up in 6 months         Current Assessment & Plan     Follows nephrology  Continue amlodipine 10 mg daily  Continue atorvastatin 10 mg daily  Continue clopidogrel 75 mg daily   Continue hydrochlorothiazide 12.5mg daily   Continue lisinopril 5 mg daily  Continue magnesium 300 mg daily   Cbc,cmp, lipid   Follow up in 6 months         Sacroiliitis (CMS-HCC)    Overview     Follows pain management  Has been exercising to help          Current Assessment & Plan     Follows pain management  Has been exercising to help          COPD (chronic obstructive pulmonary disease) (Multi)    Current Assessment & Plan     Stable  Not taking trelogy due to cost         Age-related osteoporosis without current pathological fracture    Overview     - dexa 12/2023 - reviewed in detail.  Long discussion had with patient  and she would like to continue calcium with vitamin d 1200 mg daily and recheck dexa as prolia is too expensive and she can't tolerate fosamax          Current Assessment & Plan     - dexa 12/2023 - reviewed in detail.  Long discussion had with patient and she would like to continue calcium with vitamin d 1200 mg daily and recheck dexa as prolia is too expensive and she can't tolerate fosamax          Medicare annual wellness visit, subsequent    Overview     - UTD with vaccine   -  colonoscopy 2023  - mammogram ordered  - dexa 12/2023 - reviewed in detail.  Long discussion had with patient and she would like to continue calcium with vitamin d 1200 mg daily and recheck dexa as prolia is too expensive and she can't tolerate fosamax   -  living will reviewed with patient           Current Assessment & Plan     - UTD with vaccine   -  colonoscopy 2023  - mammogram ordered  - dexa 12/2023 - reviewed in detail.  Long discussion had with patient and she would like to continue calcium with vitamin d 1200 mg daily and recheck dexa as prolia is too expensive and she can't tolerate fosamax   -  living will reviewed with patient         Crohn's disease of small intestine with rectal bleeding (Multi)    Current Assessment & Plan     Stable  Following GI  Had colonoscopy 2023          Other Visit Diagnoses       Routine general medical examination at health care facility    -  Primary    Screening mammogram, encounter for        Relevant Orders    BI mammo bilateral screening tomosynthesis

## 2024-04-16 PROBLEM — N76.1 SUBACUTE VAGINITIS: Status: RESOLVED | Noted: 2023-09-12 | Resolved: 2024-04-16

## 2024-04-16 PROBLEM — T50.8X5A ALLERGIC REACTION TO CONTRAST DYE: Status: RESOLVED | Noted: 2023-09-12 | Resolved: 2024-04-16

## 2024-04-16 PROBLEM — K50.011: Status: ACTIVE | Noted: 2024-04-16

## 2024-04-16 PROBLEM — K29.70 GASTRITIS: Status: RESOLVED | Noted: 2023-09-12 | Resolved: 2024-04-16

## 2024-04-16 PROBLEM — T46.6X5A MYALGIA DUE TO STATIN: Status: RESOLVED | Noted: 2023-05-22 | Resolved: 2024-04-16

## 2024-04-16 PROBLEM — B35.1 TINEA UNGUIUM: Status: RESOLVED | Noted: 2021-09-02 | Resolved: 2024-04-16

## 2024-04-16 PROBLEM — D18.01 HEMANGIOMA OF SKIN AND SUBCUTANEOUS TISSUE: Status: RESOLVED | Noted: 2021-09-02 | Resolved: 2024-04-16

## 2024-04-16 PROBLEM — D48.5 NEOPLASM OF UNCERTAIN BEHAVIOR OF SKIN: Status: RESOLVED | Noted: 2021-09-02 | Resolved: 2024-04-16

## 2024-04-16 PROBLEM — L21.9 SEBORRHEIC DERMATITIS, UNSPECIFIED: Status: RESOLVED | Noted: 2021-09-02 | Resolved: 2024-04-16

## 2024-04-16 PROBLEM — K52.9 COLITIS: Status: RESOLVED | Noted: 2023-09-12 | Resolved: 2024-04-16

## 2024-04-16 PROBLEM — G89.29 CHRONIC EPIGASTRIC PAIN: Status: RESOLVED | Noted: 2023-09-12 | Resolved: 2024-04-16

## 2024-04-16 PROBLEM — M54.9 BACK PAIN: Status: RESOLVED | Noted: 2023-09-12 | Resolved: 2024-04-16

## 2024-04-16 PROBLEM — M85.80 OSTEOPENIA: Status: RESOLVED | Noted: 2023-09-12 | Resolved: 2024-04-16

## 2024-04-16 PROBLEM — D22.60 MELANOCYTIC NEVI OF UNSPECIFIED UPPER LIMB, INCLUDING SHOULDER: Status: RESOLVED | Noted: 2021-09-02 | Resolved: 2024-04-16

## 2024-04-16 PROBLEM — D22.5 MELANOCYTIC NEVI OF TRUNK: Status: RESOLVED | Noted: 2021-09-02 | Resolved: 2024-04-16

## 2024-04-16 PROBLEM — J01.10 ACUTE NON-RECURRENT FRONTAL SINUSITIS: Status: RESOLVED | Noted: 2023-08-16 | Resolved: 2024-04-16

## 2024-04-16 PROBLEM — N17.9 ACUTE KIDNEY INJURY (CMS-HCC): Status: RESOLVED | Noted: 2023-05-22 | Resolved: 2024-04-16

## 2024-04-16 PROBLEM — R10.13 CHRONIC EPIGASTRIC PAIN: Status: RESOLVED | Noted: 2023-09-12 | Resolved: 2024-04-16

## 2024-04-16 PROBLEM — L90.5 SCAR CONDITION AND FIBROSIS OF SKIN: Status: RESOLVED | Noted: 2021-09-02 | Resolved: 2024-04-16

## 2024-04-16 PROBLEM — M79.10 MYALGIA DUE TO STATIN: Status: RESOLVED | Noted: 2023-05-22 | Resolved: 2024-04-16

## 2024-04-16 PROBLEM — J20.9 ACUTE BRONCHITIS WITH BRONCHOSPASM: Status: RESOLVED | Noted: 2023-08-26 | Resolved: 2024-04-16

## 2024-04-16 PROBLEM — G89.29 CHRONIC LOW BACK PAIN: Status: RESOLVED | Noted: 2023-05-22 | Resolved: 2024-04-16

## 2024-04-16 PROBLEM — Z00.00 MEDICARE ANNUAL WELLNESS VISIT, SUBSEQUENT: Status: ACTIVE | Noted: 2024-04-16

## 2024-04-16 PROBLEM — K21.9 ESOPHAGEAL REFLUX: Status: RESOLVED | Noted: 2023-05-22 | Resolved: 2024-04-16

## 2024-04-16 PROBLEM — N95.1 MENOPAUSAL SYMPTOM: Status: RESOLVED | Noted: 2023-09-12 | Resolved: 2024-04-16

## 2024-04-16 PROBLEM — D22.70 MELANOCYTIC NEVI OF UNSPECIFIED LOWER LIMB, INCLUDING HIP: Status: RESOLVED | Noted: 2021-09-02 | Resolved: 2024-04-16

## 2024-04-16 PROBLEM — L81.4 LENTIGINES: Status: RESOLVED | Noted: 2021-09-02 | Resolved: 2024-04-16

## 2024-04-16 PROBLEM — L82.0 INFLAMED SEBORRHEIC KERATOSIS: Status: RESOLVED | Noted: 2021-09-02 | Resolved: 2024-04-16

## 2024-04-16 PROBLEM — M54.50 CHRONIC LOW BACK PAIN: Status: RESOLVED | Noted: 2023-05-22 | Resolved: 2024-04-16

## 2024-04-16 PROBLEM — I20.9 ANGINA PECTORIS (CMS-HCC): Status: RESOLVED | Noted: 2023-08-16 | Resolved: 2024-04-16

## 2024-04-16 PROBLEM — K13.0 CHEILITIS: Status: RESOLVED | Noted: 2021-09-02 | Resolved: 2024-04-16

## 2024-04-16 NOTE — ASSESSMENT & PLAN NOTE
Follows nephrology  Continue amlodipine 10 mg daily  Continue atorvastatin 10 mg daily  Continue clopidogrel 75 mg daily   Continue hydrochlorothiazide 12.5mg daily   Continue lisinopril 5 mg daily  Continue magnesium 300 mg daily   Cbc,cmp, lipid   Follow up in 6 months

## 2024-04-16 NOTE — ASSESSMENT & PLAN NOTE
- UTD with vaccine   -  colonoscopy 2023  - mammogram ordered  - dexa 12/2023 - reviewed in detail.  Long discussion had with patient and she would like to continue calcium with vitamin d 1200 mg daily and recheck dexa as prolia is too expensive and she can't tolerate fosamax   -  living will reviewed with patient

## 2024-04-17 NOTE — ASSESSMENT & PLAN NOTE
- dexa 12/2023 - reviewed in detail.  Long discussion had with patient and she would like to continue calcium with vitamin d 1200 mg daily and recheck dexa as prolia is too expensive and she can't tolerate fosamax

## 2024-04-17 NOTE — ASSESSMENT & PLAN NOTE
Hx of CVA  Recommended statin - had myaglia with rosuvastatin  Would like to try another statin   Has been tolerating atorvastatin 10 mg  Lipid panel ordered

## 2024-04-22 ENCOUNTER — APPOINTMENT (OUTPATIENT)
Dept: ORTHOPEDIC SURGERY | Facility: CLINIC | Age: 79
End: 2024-04-22
Payer: MEDICARE

## 2024-04-23 DIAGNOSIS — I10 BENIGN ESSENTIAL HYPERTENSION: ICD-10-CM

## 2024-04-23 RX ORDER — AMLODIPINE BESYLATE 10 MG/1
10 TABLET ORAL DAILY
Qty: 90 TABLET | Refills: 1 | Status: SHIPPED | OUTPATIENT
Start: 2024-04-23

## 2024-05-06 ENCOUNTER — LAB (OUTPATIENT)
Dept: LAB | Facility: LAB | Age: 79
End: 2024-05-06
Payer: MEDICARE

## 2024-05-06 ENCOUNTER — OFFICE VISIT (OUTPATIENT)
Dept: RHEUMATOLOGY | Facility: CLINIC | Age: 79
End: 2024-05-06
Payer: MEDICARE

## 2024-05-06 VITALS
WEIGHT: 131 LBS | BODY MASS INDEX: 25.72 KG/M2 | DIASTOLIC BLOOD PRESSURE: 62 MMHG | SYSTOLIC BLOOD PRESSURE: 132 MMHG | HEART RATE: 89 BPM | HEIGHT: 60 IN

## 2024-05-06 DIAGNOSIS — E55.9 HYPOVITAMINOSIS D: ICD-10-CM

## 2024-05-06 DIAGNOSIS — G57.93 NEUROPATHY OF BOTH FEET: ICD-10-CM

## 2024-05-06 DIAGNOSIS — M11.20 PSEUDOGOUT: Primary | ICD-10-CM

## 2024-05-06 DIAGNOSIS — Z79.899 LONG-TERM USE OF PLAQUENIL: ICD-10-CM

## 2024-05-06 DIAGNOSIS — M35.9 UNDIFFERENTIATED CONNECTIVE TISSUE DISEASE (MULTI): ICD-10-CM

## 2024-05-06 DIAGNOSIS — M11.20 PSEUDOGOUT: ICD-10-CM

## 2024-05-06 LAB
CRP SERPL-MCNC: 0.15 MG/DL
ERYTHROCYTE [SEDIMENTATION RATE] IN BLOOD BY WESTERGREN METHOD: 18 MM/H (ref 0–30)
MAGNESIUM SERPL-MCNC: 1.6 MG/DL (ref 1.6–2.4)

## 2024-05-06 PROCEDURE — 86160 COMPLEMENT ANTIGEN: CPT

## 2024-05-06 PROCEDURE — 1157F ADVNC CARE PLAN IN RCRD: CPT | Performed by: INTERNAL MEDICINE

## 2024-05-06 PROCEDURE — 86225 DNA ANTIBODY NATIVE: CPT

## 2024-05-06 PROCEDURE — 3075F SYST BP GE 130 - 139MM HG: CPT | Performed by: INTERNAL MEDICINE

## 2024-05-06 PROCEDURE — 3078F DIAST BP <80 MM HG: CPT | Performed by: INTERNAL MEDICINE

## 2024-05-06 PROCEDURE — 1160F RVW MEDS BY RX/DR IN RCRD: CPT | Performed by: INTERNAL MEDICINE

## 2024-05-06 PROCEDURE — 86140 C-REACTIVE PROTEIN: CPT

## 2024-05-06 PROCEDURE — 36415 COLL VENOUS BLD VENIPUNCTURE: CPT

## 2024-05-06 PROCEDURE — 82306 VITAMIN D 25 HYDROXY: CPT

## 2024-05-06 PROCEDURE — 85652 RBC SED RATE AUTOMATED: CPT

## 2024-05-06 PROCEDURE — 83735 ASSAY OF MAGNESIUM: CPT

## 2024-05-06 PROCEDURE — 1159F MED LIST DOCD IN RCRD: CPT | Performed by: INTERNAL MEDICINE

## 2024-05-06 PROCEDURE — 99213 OFFICE O/P EST LOW 20 MIN: CPT | Performed by: INTERNAL MEDICINE

## 2024-05-06 PROCEDURE — 86038 ANTINUCLEAR ANTIBODIES: CPT

## 2024-05-06 PROCEDURE — 86235 NUCLEAR ANTIGEN ANTIBODY: CPT

## 2024-05-06 RX ORDER — COLCHICINE 0.6 MG/1
0.6 TABLET ORAL DAILY
Qty: 90 TABLET | Refills: 1 | Status: SHIPPED | OUTPATIENT
Start: 2024-05-06 | End: 2024-11-02

## 2024-05-06 RX ORDER — FERROUS SULFATE, DRIED 160(50) MG
1 TABLET, EXTENDED RELEASE ORAL DAILY
COMMUNITY

## 2024-05-06 ASSESSMENT — ENCOUNTER SYMPTOMS
SHORTNESS OF BREATH: 0
FATIGUE: 0
ROS GI COMMENTS: IBS

## 2024-05-06 NOTE — PROGRESS NOTES
Subjective   Patient ID: Vicky Govea is a 79 y.o. female who presents for Follow-up (Hands/wrists worse also tingling in feet is more noticeable ).  HPI  Patient with history of positive SUHA, positive anti-double-stranded DNA with antihistone antibody.  Also history of osteoarthritis and chondrocalcinosis seen on her wrist x-rays.    At her last visit in January 2023 she was feeling a bit achy and we discussed about her holding her rosuvastatin.  It did help with some of her achiness and she was changed to a different statin.  She had been referred to pain management for her back symptoms and she also had neuropathy symptoms in her feet.    She continues to have neuropathy symptoms in her feet that she notices at night.  Sometimes she feels that there is nerve tingling.  She has had hand pain in her wrist and some more swelling especially in the right wrist.  Denies any rashes.  The right wrist is more swollen than the left.  Both thumbs give her sharp pains.  Denies any breathing or GI issues.    Review of Systems   Constitutional:  Negative for fatigue.   HENT:  Negative for mouth sores.    Eyes:         Checked for plaquenil toxicity.  Also evaluated for MD   Respiratory:  Negative for shortness of breath.    Gastrointestinal:         IBS   Musculoskeletal:         Per HPI   Skin:  Negative for rash.   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    -Can do blood work again concerning this    Has had some increase in right wrist pain and does have evidence of chondrocalcinosis in her wrist.  Can start her on  colchicine.  Discussed side effects of the medication with her.  She also has symptoms of osteoarthritis in her CMC's.  Will do further blood work concerning chondrocalcinosis to look for other etiology.    Neuropathy symptoms in her feet.  Discussed the use of gabapentin and other medications similar to this but she has concerns about side effects.  Also discussed about topical Seshan cream and topical lidocaine that she can get over-the-counter.       Sheela Ricks MD 05/06/24 3:57 PM

## 2024-05-07 LAB
25(OH)D3 SERPL-MCNC: 41 NG/ML (ref 30–100)
C3 SERPL-MCNC: 150 MG/DL (ref 87–200)
C4 SERPL-MCNC: 43 MG/DL (ref 10–50)
CENTROMERE B AB SER-ACNC: <0.2 AI
CHROMATIN AB SERPL-ACNC: <0.2 AI
DSDNA AB SER-ACNC: 2 IU/ML
ENA JO1 AB SER QL IA: <0.2 AI
ENA RNP AB SER IA-ACNC: <0.2 AI
ENA SCL70 AB SER QL IA: <0.2 AI
ENA SM AB SER IA-ACNC: <0.2 AI
ENA SM+RNP AB SER QL IA: <0.2 AI
ENA SS-A AB SER IA-ACNC: <0.2 AI
ENA SS-B AB SER IA-ACNC: <0.2 AI
RIBOSOMAL P AB SER-ACNC: <0.2 AI

## 2024-05-09 LAB
ANA PATTERN: ABNORMAL
ANA SER QL HEP2 SUBST: POSITIVE
ANA TITR SER IF: ABNORMAL {TITER}

## 2024-05-17 ENCOUNTER — PATIENT MESSAGE (OUTPATIENT)
Dept: RHEUMATOLOGY | Facility: CLINIC | Age: 79
End: 2024-05-17
Payer: MEDICARE

## 2024-05-26 ENCOUNTER — PATIENT MESSAGE (OUTPATIENT)
Dept: RHEUMATOLOGY | Facility: CLINIC | Age: 79
End: 2024-05-26
Payer: MEDICARE

## 2024-06-24 ENCOUNTER — APPOINTMENT (OUTPATIENT)
Dept: ORTHOPEDIC SURGERY | Facility: CLINIC | Age: 79
End: 2024-06-24
Payer: MEDICARE

## 2024-06-24 DIAGNOSIS — M65.30 TRIGGER FINGER, ACQUIRED: Primary | ICD-10-CM

## 2024-06-24 PROCEDURE — 1160F RVW MEDS BY RX/DR IN RCRD: CPT | Performed by: ORTHOPAEDIC SURGERY

## 2024-06-24 PROCEDURE — 1157F ADVNC CARE PLAN IN RCRD: CPT | Performed by: ORTHOPAEDIC SURGERY

## 2024-06-24 PROCEDURE — 1036F TOBACCO NON-USER: CPT | Performed by: ORTHOPAEDIC SURGERY

## 2024-06-24 PROCEDURE — 99214 OFFICE O/P EST MOD 30 MIN: CPT | Performed by: ORTHOPAEDIC SURGERY

## 2024-06-24 PROCEDURE — 1125F AMNT PAIN NOTED PAIN PRSNT: CPT | Performed by: ORTHOPAEDIC SURGERY

## 2024-06-24 PROCEDURE — 1159F MED LIST DOCD IN RCRD: CPT | Performed by: ORTHOPAEDIC SURGERY

## 2024-06-24 ASSESSMENT — PAIN SCALES - GENERAL: PAINLEVEL_OUTOF10: 2

## 2024-06-24 ASSESSMENT — PAIN - FUNCTIONAL ASSESSMENT: PAIN_FUNCTIONAL_ASSESSMENT: 0-10

## 2024-06-25 PROCEDURE — 20550 NJX 1 TENDON SHEATH/LIGAMENT: CPT | Performed by: ORTHOPAEDIC SURGERY

## 2024-06-25 RX ORDER — LIDOCAINE HYDROCHLORIDE 10 MG/ML
1 INJECTION INFILTRATION; PERINEURAL
Status: COMPLETED | OUTPATIENT
Start: 2024-06-25 | End: 2024-06-25

## 2024-06-25 RX ORDER — TRIAMCINOLONE ACETONIDE 40 MG/ML
40 INJECTION, SUSPENSION INTRA-ARTICULAR; INTRAMUSCULAR
Status: COMPLETED | OUTPATIENT
Start: 2024-06-25 | End: 2024-06-25
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
## 2024-07-09 ENCOUNTER — APPOINTMENT (OUTPATIENT)
Dept: PAIN MEDICINE | Facility: CLINIC | Age: 79
End: 2024-07-09
Payer: MEDICARE

## 2024-07-09 VITALS — DIASTOLIC BLOOD PRESSURE: 73 MMHG | SYSTOLIC BLOOD PRESSURE: 140 MMHG | HEART RATE: 75 BPM | RESPIRATION RATE: 16 BRPM

## 2024-07-09 DIAGNOSIS — I10 BENIGN ESSENTIAL HYPERTENSION: ICD-10-CM

## 2024-07-09 DIAGNOSIS — M46.1 SACROILIITIS (CMS-HCC): ICD-10-CM

## 2024-07-09 DIAGNOSIS — Z79.899 LONG-TERM USE OF PLAQUENIL: ICD-10-CM

## 2024-07-09 DIAGNOSIS — M46.1 SACROILIITIS (CMS-HCC): Primary | ICD-10-CM

## 2024-07-09 DIAGNOSIS — I10 BENIGN ESSENTIAL HYPERTENSION: Primary | ICD-10-CM

## 2024-07-09 PROCEDURE — 1125F AMNT PAIN NOTED PAIN PRSNT: CPT | Performed by: PHYSICAL MEDICINE & REHABILITATION

## 2024-07-09 PROCEDURE — 3077F SYST BP >= 140 MM HG: CPT | Performed by: PHYSICAL MEDICINE & REHABILITATION

## 2024-07-09 PROCEDURE — 3078F DIAST BP <80 MM HG: CPT | Performed by: PHYSICAL MEDICINE & REHABILITATION

## 2024-07-09 PROCEDURE — 99214 OFFICE O/P EST MOD 30 MIN: CPT | Performed by: PHYSICAL MEDICINE & REHABILITATION

## 2024-07-09 PROCEDURE — 1157F ADVNC CARE PLAN IN RCRD: CPT | Performed by: PHYSICAL MEDICINE & REHABILITATION

## 2024-07-09 RX ORDER — HYDROCHLOROTHIAZIDE 12.5 MG/1
12.5 TABLET ORAL DAILY
Qty: 90 TABLET | Refills: 0 | Status: SHIPPED | OUTPATIENT
Start: 2024-07-09 | End: 2024-07-10

## 2024-07-09 RX ORDER — HYDROXYCHLOROQUINE SULFATE 200 MG/1
200 TABLET, FILM COATED ORAL DAILY
Qty: 90 TABLET | Refills: 0 | Status: SHIPPED | OUTPATIENT
Start: 2024-07-09 | End: 2024-10-07

## 2024-07-09 RX ORDER — DIAZEPAM 5 MG/1
5 TABLET ORAL ONCE AS NEEDED
OUTPATIENT
Start: 2024-07-09

## 2024-07-09 RX ORDER — GABAPENTIN 300 MG/1
300 CAPSULE ORAL NIGHTLY
Qty: 30 CAPSULE | Refills: 2 | Status: SHIPPED | OUTPATIENT
Start: 2024-07-09 | End: 2024-07-10

## 2024-07-09 ASSESSMENT — PAIN SCALES - GENERAL: PAINLEVEL: 3

## 2024-07-09 ASSESSMENT — ENCOUNTER SYMPTOMS: BACK PAIN: 1
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
## 2024-07-10 RX ORDER — HYDROCHLOROTHIAZIDE 12.5 MG/1
12.5 TABLET ORAL DAILY
Qty: 90 TABLET | Refills: 1 | Status: SHIPPED | OUTPATIENT
Start: 2024-07-10

## 2024-07-10 RX ORDER — GABAPENTIN 300 MG/1
300 CAPSULE ORAL NIGHTLY
Qty: 90 CAPSULE | Refills: 1 | Status: SHIPPED | OUTPATIENT
Start: 2024-07-10 | End: 2025-01-06

## 2024-07-23 ENCOUNTER — APPOINTMENT (OUTPATIENT)
Dept: PRIMARY CARE | Facility: CLINIC | Age: 79
End: 2024-07-23
Payer: MEDICARE

## 2024-07-31 ENCOUNTER — APPOINTMENT (OUTPATIENT)
Dept: PRIMARY CARE | Facility: CLINIC | Age: 79
End: 2024-07-31
Payer: MEDICARE

## 2024-07-31 VITALS
OXYGEN SATURATION: 97 % | BODY MASS INDEX: 25.32 KG/M2 | HEART RATE: 68 BPM | SYSTOLIC BLOOD PRESSURE: 142 MMHG | WEIGHT: 129 LBS | DIASTOLIC BLOOD PRESSURE: 60 MMHG | HEIGHT: 60 IN

## 2024-07-31 DIAGNOSIS — I10 BENIGN ESSENTIAL HYPERTENSION: ICD-10-CM

## 2024-07-31 PROCEDURE — 1159F MED LIST DOCD IN RCRD: CPT | Performed by: NURSE PRACTITIONER

## 2024-07-31 PROCEDURE — 3078F DIAST BP <80 MM HG: CPT | Performed by: NURSE PRACTITIONER

## 2024-07-31 PROCEDURE — 1036F TOBACCO NON-USER: CPT | Performed by: NURSE PRACTITIONER

## 2024-07-31 PROCEDURE — 1160F RVW MEDS BY RX/DR IN RCRD: CPT | Performed by: NURSE PRACTITIONER

## 2024-07-31 PROCEDURE — 99213 OFFICE O/P EST LOW 20 MIN: CPT | Performed by: NURSE PRACTITIONER

## 2024-07-31 PROCEDURE — 1157F ADVNC CARE PLAN IN RCRD: CPT | Performed by: NURSE PRACTITIONER

## 2024-07-31 PROCEDURE — 3077F SYST BP >= 140 MM HG: CPT | Performed by: NURSE PRACTITIONER

## 2024-07-31 RX ORDER — LISINOPRIL 20 MG/1
20 TABLET ORAL 2 TIMES DAILY
Qty: 180 TABLET | Refills: 3 | Status: SHIPPED | OUTPATIENT
Start: 2024-07-31 | End: 2025-07-31

## 2024-07-31 ASSESSMENT — ENCOUNTER SYMPTOMS: HYPERTENSION: 1

## 2024-07-31 NOTE — ASSESSMENT & PLAN NOTE
Follows nephrology  BP still borderline and her readings have been the same at home.  Continue amlodipine 10 mg daily  Continue atorvastatin 10 mg daily  Continue clopidogrel 75 mg daily   Continue hydrochlorothiazide 12.5mg daily   Increase lisinopril 20 mg daily  Continue magnesium 300 mg daily   Cbc,cmp, lipid reviewed  Follow up in 6 months  She will be having surgery later this year she will be following with orthopedics in October to discuss scheduling  Discussed with her to follow-up in 2 months to make sure that blood pressure was under control prior to following up with her surgeon

## 2024-07-31 NOTE — PROGRESS NOTES
Subjective   Patient ID: Vicky Govea is a 79 y.o. female who presents for Blood Pressure Check (Vicky states it has been running high for a few days, would like to discuss bp medication).    Hypertension  Patient is here for follow-up of elevated blood pressure. She is not exercising and is adherent to a low-salt diet. Blood pressure is well controlled at home. Cardiac symptoms: none. Patient denies chest pain, irregular heart beat, and lower extremity edema. Cardiovascular risk factors: none. Use of agents associated with hypertension: none. History of target organ damage: none.  Has been taking blood pressure at home will have occasional higher readings  140-150s for 1-2 days but then return to 120s after that  Denies chest pain, shortness of breath  Approximately 1 month ago she did have more increased readings and had increased her lisinopril to 15 mg.      Hypertension         Review of Systems   All other systems reviewed and are negative.      Objective   /60   Pulse 68   Ht 1.524 m (5')   Wt 58.5 kg (129 lb)   LMP  (LMP Unknown)   SpO2 97%   BMI 25.19 kg/m²     Physical Exam  Vitals and nursing note reviewed.   Constitutional:       General: She is not in acute distress.     Appearance: Normal appearance.   HENT:      Head: Normocephalic and atraumatic.      Right Ear: External ear normal.      Left Ear: External ear normal.      Nose: Nose normal.      Mouth/Throat:      Mouth: Mucous membranes are moist.      Pharynx: Oropharynx is clear.   Eyes:      Extraocular Movements: Extraocular movements intact.      Conjunctiva/sclera: Conjunctivae normal.      Pupils: Pupils are equal, round, and reactive to light.   Neck:      Vascular: No carotid bruit.   Cardiovascular:      Rate and Rhythm: Normal rate and regular rhythm.      Pulses: Normal pulses.      Heart sounds: Normal heart sounds.   Pulmonary:      Effort: Pulmonary effort is normal.      Breath sounds: Normal breath sounds.    Musculoskeletal:      Cervical back: Normal range of motion and neck supple.   Lymphadenopathy:      Cervical: No cervical adenopathy.   Skin:     General: Skin is warm and dry.      Capillary Refill: Capillary refill takes less than 2 seconds.   Neurological:      Mental Status: She is alert and oriented to person, place, and time.   Psychiatric:         Mood and Affect: Mood normal.         Behavior: Behavior normal.         Thought Content: Thought content normal.         Judgment: Judgment normal.         Assessment/Plan   Problem List Items Addressed This Visit             ICD-10-CM    Benign essential hypertension I10     Follows nephrology  BP still borderline and her readings have been the same at home.  Continue amlodipine 10 mg daily  Continue atorvastatin 10 mg daily  Continue clopidogrel 75 mg daily   Continue hydrochlorothiazide 12.5mg daily   Increase lisinopril 20 mg daily  Continue magnesium 300 mg daily   Cbc,cmp, lipid reviewed  Follow up in 6 months  She will be having surgery later this year she will be following with orthopedics in October to discuss scheduling  Discussed with her to follow-up in 2 months to make sure that blood pressure was under control prior to following up with her surgeon         Relevant Medications    lisinopril 20 mg tablet

## 2024-07-31 NOTE — PATIENT INSTRUCTIONS
I am increasing your lisinopril to 20 mg  I sent a refill to [facility]  Follow up in 6 months with new PcP

## 2024-08-01 ENCOUNTER — APPOINTMENT (OUTPATIENT)
Dept: RHEUMATOLOGY | Facility: CLINIC | Age: 79
End: 2024-08-01
Payer: MEDICARE
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
## 2024-08-07 ENCOUNTER — HOSPITAL ENCOUNTER (OUTPATIENT)
Dept: OPERATING ROOM | Facility: CLINIC | Age: 79
Setting detail: OUTPATIENT SURGERY
Discharge: HOME | End: 2024-08-07
Payer: MEDICARE

## 2024-08-07 VITALS
RESPIRATION RATE: 16 BRPM | DIASTOLIC BLOOD PRESSURE: 66 MMHG | SYSTOLIC BLOOD PRESSURE: 151 MMHG | BODY MASS INDEX: 25.54 KG/M2 | OXYGEN SATURATION: 97 % | WEIGHT: 130.07 LBS | TEMPERATURE: 97.5 F | HEIGHT: 60 IN | HEART RATE: 68 BPM

## 2024-08-07 DIAGNOSIS — M46.1 SACROILIITIS (CMS-HCC): ICD-10-CM

## 2024-08-07 PROCEDURE — 7100000009 HC PHASE TWO TIME - INITIAL BASE CHARGE

## 2024-08-07 PROCEDURE — 3600000006 HC OR TIME - EACH INCREMENTAL 1 MINUTE - PROCEDURE LEVEL ONE

## 2024-08-07 PROCEDURE — 2500000004 HC RX 250 GENERAL PHARMACY W/ HCPCS (ALT 636 FOR OP/ED): Performed by: PHYSICAL MEDICINE & REHABILITATION

## 2024-08-07 PROCEDURE — 3600000001 HC OR TIME - INITIAL BASE CHARGE - PROCEDURE LEVEL ONE

## 2024-08-07 PROCEDURE — 2500000002 HC RX 250 W HCPCS SELF ADMINISTERED DRUGS (ALT 637 FOR MEDICARE OP, ALT 636 FOR OP/ED): Performed by: PHYSICAL MEDICINE & REHABILITATION

## 2024-08-07 PROCEDURE — 7100000010 HC PHASE TWO TIME - EACH INCREMENTAL 1 MINUTE

## 2024-08-07 PROCEDURE — 2550000001 HC RX 255 CONTRASTS: Performed by: PHYSICAL MEDICINE & REHABILITATION

## 2024-08-07 PROCEDURE — 27096 INJECT SACROILIAC JOINT: CPT | Performed by: PHYSICAL MEDICINE & REHABILITATION

## 2024-08-07 PROCEDURE — 2500000005 HC RX 250 GENERAL PHARMACY W/O HCPCS: Performed by: PHYSICAL MEDICINE & REHABILITATION

## 2024-08-07 RX ORDER — METHYLPREDNISOLONE ACETATE 40 MG/ML
INJECTION, SUSPENSION INTRA-ARTICULAR; INTRALESIONAL; INTRAMUSCULAR; SOFT TISSUE AS NEEDED
Status: COMPLETED | OUTPATIENT
Start: 2024-08-07 | End: 2024-08-07

## 2024-08-07 RX ORDER — LIDOCAINE HYDROCHLORIDE 5 MG/ML
INJECTION, SOLUTION INFILTRATION; INTRAVENOUS AS NEEDED
Status: COMPLETED | OUTPATIENT
Start: 2024-08-07 | End: 2024-08-07

## 2024-08-07 RX ORDER — DIAZEPAM 5 MG/1
5 TABLET ORAL ONCE AS NEEDED
Status: COMPLETED | OUTPATIENT
Start: 2024-08-07 | End: 2024-08-07

## 2024-08-07 ASSESSMENT — ENCOUNTER SYMPTOMS
LOSS OF SENSATION IN FEET: 0
OCCASIONAL FEELINGS OF UNSTEADINESS: 0
DEPRESSION: 0

## 2024-08-07 ASSESSMENT — COLUMBIA-SUICIDE SEVERITY RATING SCALE - C-SSRS
1. IN THE PAST MONTH, HAVE YOU WISHED YOU WERE DEAD OR WISHED YOU COULD GO TO SLEEP AND NOT WAKE UP?: NO
6. HAVE YOU EVER DONE ANYTHING, STARTED TO DO ANYTHING, OR PREPARED TO DO ANYTHING TO END YOUR LIFE?: NO
2. HAVE YOU ACTUALLY HAD ANY THOUGHTS OF KILLING YOURSELF?: NO

## 2024-08-07 ASSESSMENT — PAIN SCALES - GENERAL
PAINLEVEL_OUTOF10: 0 - NO PAIN
PAINLEVEL_OUTOF10: 0 - NO PAIN
PAINLEVEL_OUTOF10: 2

## 2024-08-07 ASSESSMENT — PAIN - FUNCTIONAL ASSESSMENT
PAIN_FUNCTIONAL_ASSESSMENT: 0-10

## 2024-08-07 NOTE — DISCHARGE INSTRUCTIONS
Discharge Instructions for Anesthesiology Pain Service Patients - Dr. Sotelo    Observe the needle site for excessive bleeding (slow general oozing that completely soaks the dressing or fresh bright red bleeding).  In either case, apply pressure to the area, elevate it if possible and call your doctor at once.    Observe/monitor for the following signs and symptoms:         Needle site:  change in color, numbness or tingling, coldness to touch, swelling, drainage       Temperature of 101.5 or higher       Increased or uncontrollable pain    If you notice any of the above signs or symptoms, please call your doctor at once.    Your pain may not be gone immediately after this procedure; it generally takes 3 to 5 days for the steroid to work.    Keep the needle site clean and dry for 24 hours.    Continue your present medications.    No driving or operating machinery for 24 hours if you have received sedation.    Make an appointment to see you doctor in 2-3 weeks    Central Scheduling Number:  [phone]  Dr. Sotelo's office:  [phone]  Dr. Sotelo's Nurse line:  [phone]  After hours Pain Management:  [phone].    If any problems occur, or if you have further questions, please call you doctor as soon as possible.  If you find that you cannot reach your doctor, but feel that your condition needs a doctors attention, go to the  emergency room nearest your home.

## 2024-08-08 NOTE — ADDENDUM NOTE
Encounter addended by: Ruma García RN on: 8/8/2024 9:32 AM   Actions taken: Contacts section saved, Clinical Note Signed, Flowsheet accepted

## 2024-08-13 ENCOUNTER — APPOINTMENT (OUTPATIENT)
Dept: OPHTHALMOLOGY | Facility: CLINIC | Age: 79
End: 2024-08-13
Payer: MEDICARE

## 2024-08-14 DIAGNOSIS — Z79.899 LONG-TERM USE OF PLAQUENIL: ICD-10-CM

## 2024-08-15 RX ORDER — HYDROXYCHLOROQUINE SULFATE 200 MG/1
200 TABLET, FILM COATED ORAL DAILY
Qty: 90 TABLET | Refills: 1 | OUTPATIENT
Start: 2024-08-15

## 2024-08-16 DIAGNOSIS — K58.0 IRRITABLE BOWEL SYNDROME WITH DIARRHEA: Primary | ICD-10-CM

## 2024-08-16 DIAGNOSIS — K58.0 IRRITABLE BOWEL SYNDROME WITH DIARRHEA: ICD-10-CM

## 2024-08-16 RX ORDER — HYOSCYAMINE SULFATE 0.125 MG
0.12 TABLET ORAL EVERY 6 HOURS PRN
Qty: 100 TABLET | Refills: 4 | Status: SHIPPED | OUTPATIENT
Start: 2024-08-16 | End: 2024-08-16 | Stop reason: SDUPTHER

## 2024-08-16 RX ORDER — HYOSCYAMINE SULFATE 0.125 MG
0.12 TABLET ORAL EVERY 6 HOURS PRN
Qty: 100 TABLET | Refills: 4 | Status: SHIPPED | OUTPATIENT
Start: 2024-08-16 | End: 2025-08-16

## 2024-08-21 DIAGNOSIS — I63.9 CEREBRAL INFARCTION, UNSPECIFIED MECHANISM (MULTI): ICD-10-CM

## 2024-08-21 DIAGNOSIS — K58.0 IRRITABLE BOWEL SYNDROME WITH DIARRHEA: ICD-10-CM

## 2024-08-21 DIAGNOSIS — I10 BENIGN ESSENTIAL HYPERTENSION: ICD-10-CM

## 2024-08-21 RX ORDER — AMLODIPINE BESYLATE 10 MG/1
10 TABLET ORAL DAILY
Qty: 90 TABLET | Refills: 1 | Status: SHIPPED | OUTPATIENT
Start: 2024-08-21

## 2024-08-21 RX ORDER — AMLODIPINE BESYLATE 10 MG/1
10 TABLET ORAL DAILY
Qty: 90 TABLET | Refills: 1 | Status: SHIPPED | OUTPATIENT
Start: 2024-08-21 | End: 2024-08-21 | Stop reason: SDUPTHER

## 2024-08-21 RX ORDER — HYOSCYAMINE SULFATE 0.125 MG
0.12 TABLET ORAL EVERY 6 HOURS PRN
Qty: 360 TABLET | Refills: 0 | Status: SHIPPED | OUTPATIENT
Start: 2024-08-21 | End: 2025-08-21

## 2024-08-21 RX ORDER — ATORVASTATIN CALCIUM 10 MG/1
10 TABLET, FILM COATED ORAL NIGHTLY
Qty: 90 TABLET | Refills: 3 | Status: SHIPPED | OUTPATIENT
Start: 2024-08-21

## 2024-08-24 ENCOUNTER — HOSPITAL ENCOUNTER (OUTPATIENT)
Dept: RADIOLOGY | Facility: HOSPITAL | Age: 79
Discharge: HOME | End: 2024-08-24
Payer: MEDICARE

## 2024-08-24 DIAGNOSIS — R30.0 DYSURIA: ICD-10-CM

## 2024-08-24 PROCEDURE — 76770 US EXAM ABDO BACK WALL COMP: CPT | Performed by: STUDENT IN AN ORGANIZED HEALTH CARE EDUCATION/TRAINING PROGRAM

## 2024-08-24 PROCEDURE — 76857 US EXAM PELVIC LIMITED: CPT | Performed by: STUDENT IN AN ORGANIZED HEALTH CARE EDUCATION/TRAINING PROGRAM

## 2024-08-24 PROCEDURE — 76770 US EXAM ABDO BACK WALL COMP: CPT

## 2024-08-24 PROCEDURE — 76856 US EXAM PELVIC COMPLETE: CPT

## 2024-08-27 ENCOUNTER — TELEPHONE (OUTPATIENT)
Dept: RHEUMATOLOGY | Facility: CLINIC | Age: 79
End: 2024-08-27
Payer: MEDICARE

## 2024-09-05 ENCOUNTER — TELEPHONE (OUTPATIENT)
Dept: RHEUMATOLOGY | Facility: CLINIC | Age: 79
End: 2024-09-05

## 2024-09-05 ENCOUNTER — APPOINTMENT (OUTPATIENT)
Dept: RHEUMATOLOGY | Facility: CLINIC | Age: 79
End: 2024-09-05
Payer: MEDICARE

## 2024-09-06 DIAGNOSIS — I10 BENIGN ESSENTIAL HYPERTENSION: ICD-10-CM

## 2024-09-06 NOTE — TELEPHONE ENCOUNTER
Pt establishing with Sheyla 9/19/2024  Last office visit: 7/31/2024  Next office visit: 9/19/2024

## 2024-09-07 RX ORDER — AMLODIPINE BESYLATE 10 MG/1
10 TABLET ORAL DAILY
Qty: 90 TABLET | Refills: 1 | Status: SHIPPED | OUTPATIENT
Start: 2024-09-07

## 2024-09-17 ENCOUNTER — APPOINTMENT (OUTPATIENT)
Dept: OPHTHALMOLOGY | Facility: CLINIC | Age: 79
End: 2024-09-17
Payer: MEDICARE

## 2024-09-17 DIAGNOSIS — H25.13 NUCLEAR SCLEROTIC CATARACT OF BOTH EYES: ICD-10-CM

## 2024-09-17 DIAGNOSIS — Z13.5 SCREENING FOR EYE CONDITION: ICD-10-CM

## 2024-09-17 DIAGNOSIS — H35.3132 INTERMEDIATE STAGE NONEXUDATIVE AGE-RELATED MACULAR DEGENERATION OF BOTH EYES: Primary | ICD-10-CM

## 2024-09-17 PROCEDURE — 99213 OFFICE O/P EST LOW 20 MIN: CPT

## 2024-09-17 PROCEDURE — 92134 CPTRZ OPH DX IMG PST SGM RTA: CPT

## 2024-09-17 ASSESSMENT — ENCOUNTER SYMPTOMS
ALLERGIC/IMMUNOLOGIC NEGATIVE: 0
NEUROLOGICAL NEGATIVE: 0
EYES NEGATIVE: 0
CARDIOVASCULAR NEGATIVE: 0
HEMATOLOGIC/LYMPHATIC NEGATIVE: 0
MUSCULOSKELETAL NEGATIVE: 0
RESPIRATORY NEGATIVE: 0
ENDOCRINE NEGATIVE: 0
GASTROINTESTINAL NEGATIVE: 0
CONSTITUTIONAL NEGATIVE: 0
PSYCHIATRIC NEGATIVE: 0

## 2024-09-17 ASSESSMENT — CONF VISUAL FIELD
OD_SUPERIOR_TEMPORAL_RESTRICTION: 0
OD_SUPERIOR_NASAL_RESTRICTION: 0
OS_INFERIOR_NASAL_RESTRICTION: 0
OS_INFERIOR_TEMPORAL_RESTRICTION: 0
OD_NORMAL: 1
OD_INFERIOR_TEMPORAL_RESTRICTION: 0
OS_SUPERIOR_TEMPORAL_RESTRICTION: 0
OS_NORMAL: 1
OD_INFERIOR_NASAL_RESTRICTION: 0
OS_SUPERIOR_NASAL_RESTRICTION: 0

## 2024-09-17 ASSESSMENT — TONOMETRY
OD_IOP_MMHG: 14
OS_IOP_MMHG: 14
IOP_METHOD: TONOPEN

## 2024-09-17 ASSESSMENT — EXTERNAL EXAM - RIGHT EYE: OD_EXAM: NORMAL

## 2024-09-17 ASSESSMENT — SLIT LAMP EXAM - LIDS
COMMENTS: GOOD POSITION, TRACE MGD
COMMENTS: GOOD POSITION, TRACE MGD

## 2024-09-17 ASSESSMENT — CUP TO DISC RATIO
OS_RATIO: .3
OD_RATIO: .3

## 2024-09-17 ASSESSMENT — VISUAL ACUITY
OS_PH_CC: 20/40
OS_CC: 20/50
OD_CC: 20/40
OD_CC+: +2
METHOD: SNELLEN - LINEAR
OS_CC+: -3

## 2024-09-17 ASSESSMENT — EXTERNAL EXAM - LEFT EYE: OS_EXAM: NORMAL

## 2024-09-17 NOTE — PROGRESS NOTES
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 No IRF or SRF    - Left eye (OS): Normal foveal contour, medium to large druse, intact outer retinal bands. No IRF or SRF    #Plan#:   1. AREDS 2 BID    2. Recommend UV protection, diet modification (eat green leafy vegetables, fish, lower meat consumption)  3. Amsler grid follow up  4. Observe  5. RTC in 6 months       Long-term use of [id number]  - For treatment of lupus Duration: 8 years  - Dose: 200 mg/day  - No renal or liver disease  - No evidence of pre-existing maculopathy  - No visual complaints    - OCT: no signs of plaquenil toxicity    Impression:  - No evidence of hydroxychloroquine toxicity on exam today, will repeat testing in 12 months    SLE  - Observe  - RTC with [employer] for screening    #Nuclear Cataracts OU  - likely becoming visually significant  - patient reporting more difficulty reading/driving  - will refer to anterior segment for cataract eval

## 2024-09-19 ENCOUNTER — APPOINTMENT (OUTPATIENT)
Dept: PRIMARY CARE | Facility: CLINIC | Age: 79
End: 2024-09-19
Payer: MEDICARE

## 2024-09-25 ENCOUNTER — APPOINTMENT (OUTPATIENT)
Dept: OPHTHALMOLOGY | Facility: CLINIC | Age: 79
End: 2024-09-25
Payer: MEDICARE

## 2024-09-27 PROBLEM — K57.92 DIVERTICULITIS OF INTESTINE: Status: RESOLVED | Noted: 2024-09-27 | Resolved: 2024-09-27

## 2024-09-27 PROBLEM — E66.3 OVERWEIGHT WITH BODY MASS INDEX (BMI) 25.0-29.9: Status: ACTIVE | Noted: 2024-09-27

## 2024-09-27 PROBLEM — N20.0 CALCULUS OF KIDNEY: Status: ACTIVE | Noted: 2024-09-27

## 2024-09-27 PROBLEM — Z96.642 HISTORY OF LEFT HIP REPLACEMENT: Status: RESOLVED | Noted: 2023-12-14 | Resolved: 2024-09-27

## 2024-09-27 PROBLEM — M43.16 SPONDYLOLISTHESIS OF LUMBAR REGION: Status: ACTIVE | Noted: 2024-09-27

## 2024-09-27 PROBLEM — S86.912A STRAIN OF LEFT KNEE: Status: ACTIVE | Noted: 2023-12-14

## 2024-09-27 PROBLEM — Z86.73 HISTORY OF CEREBROVASCULAR ACCIDENT: Status: ACTIVE | Noted: 2024-09-27

## 2024-09-27 PROBLEM — T46.6X5A MYALGIA DUE TO HMG COA REDUCTASE INHIBITOR: Status: ACTIVE | Noted: 2024-09-27

## 2024-09-27 PROBLEM — Z98.890 HISTORY OF COLONOSCOPY: Status: ACTIVE | Noted: 2024-09-27

## 2024-09-27 PROBLEM — M79.10 MYALGIA DUE TO HMG COA REDUCTASE INHIBITOR: Status: ACTIVE | Noted: 2024-09-27
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
## 2024-09-27 PROBLEM — G47.9 DISTURBANCE IN SLEEP BEHAVIOR: Status: ACTIVE | Noted: 2024-09-27

## 2024-09-27 PROBLEM — R53.83 FATIGUE: Status: ACTIVE | Noted: 2024-09-27

## 2024-09-27 PROBLEM — S37.009A INJURY OF KIDNEY: Status: RESOLVED | Noted: 2024-09-27 | Resolved: 2024-09-27

## 2024-09-27 PROBLEM — Z90.710 HISTORY OF HYSTERECTOMY: Status: ACTIVE | Noted: 2024-09-27

## 2024-09-27 PROBLEM — H66.90 OTITIS MEDIA: Status: RESOLVED | Noted: 2024-09-27 | Resolved: 2024-09-27

## 2024-09-30 ENCOUNTER — OFFICE VISIT (OUTPATIENT)
Dept: OBSTETRICS AND GYNECOLOGY | Facility: CLINIC | Age: 79
End: 2024-09-30
Payer: MEDICARE

## 2024-09-30 ENCOUNTER — HOSPITAL ENCOUNTER (OUTPATIENT)
Dept: RADIOLOGY | Facility: CLINIC | Age: 79
Discharge: HOME | End: 2024-09-30
Payer: MEDICARE

## 2024-09-30 VITALS
WEIGHT: 129.8 LBS | BODY MASS INDEX: 25.48 KG/M2 | SYSTOLIC BLOOD PRESSURE: 126 MMHG | HEIGHT: 60 IN | DIASTOLIC BLOOD PRESSURE: 73 MMHG

## 2024-09-30 DIAGNOSIS — Z12.11 COLON CANCER SCREENING: ICD-10-CM

## 2024-09-30 DIAGNOSIS — Z12.31 SCREENING MAMMOGRAM, ENCOUNTER FOR: ICD-10-CM

## 2024-09-30 DIAGNOSIS — N95.2 VAGINAL ATROPHY: ICD-10-CM

## 2024-09-30 DIAGNOSIS — Z90.710 HISTORY OF HYSTERECTOMY: ICD-10-CM

## 2024-09-30 DIAGNOSIS — Z01.419 ENCOUNTER FOR ANNUAL ROUTINE GYNECOLOGICAL EXAMINATION: Primary | ICD-10-CM

## 2024-09-30 LAB — POC OCCULT BLOOD STOOL #1: NEGATIVE

## 2024-09-30 PROCEDURE — 1157F ADVNC CARE PLAN IN RCRD: CPT | Performed by: OBSTETRICS & GYNECOLOGY

## 2024-09-30 PROCEDURE — G0101 CA SCREEN;PELVIC/BREAST EXAM: HCPCS | Performed by: OBSTETRICS & GYNECOLOGY

## 2024-09-30 PROCEDURE — 77067 SCR MAMMO BI INCL CAD: CPT | Performed by: RADIOLOGY

## 2024-09-30 PROCEDURE — 1126F AMNT PAIN NOTED NONE PRSNT: CPT | Performed by: OBSTETRICS & GYNECOLOGY

## 2024-09-30 PROCEDURE — 77063 BREAST TOMOSYNTHESIS BI: CPT | Performed by: RADIOLOGY

## 2024-09-30 PROCEDURE — 1159F MED LIST DOCD IN RCRD: CPT | Performed by: OBSTETRICS & GYNECOLOGY

## 2024-09-30 PROCEDURE — 82270 OCCULT BLOOD FECES: CPT | Performed by: OBSTETRICS & GYNECOLOGY

## 2024-09-30 PROCEDURE — 3074F SYST BP LT 130 MM HG: CPT | Performed by: OBSTETRICS & GYNECOLOGY

## 2024-09-30 PROCEDURE — 77067 SCR MAMMO BI INCL CAD: CPT

## 2024-09-30 PROCEDURE — 3078F DIAST BP <80 MM HG: CPT | Performed by: OBSTETRICS & GYNECOLOGY

## 2024-09-30 PROCEDURE — 1036F TOBACCO NON-USER: CPT | Performed by: OBSTETRICS & GYNECOLOGY

## 2024-09-30 ASSESSMENT — LIFESTYLE VARIABLES
AUDIT-C TOTAL SCORE: 0
SKIP TO QUESTIONS 9-10: 1
HOW MANY STANDARD DRINKS CONTAINING ALCOHOL DO YOU HAVE ON A TYPICAL DAY: PATIENT DOES NOT DRINK
HOW OFTEN DO YOU HAVE SIX OR MORE DRINKS ON ONE OCCASION: NEVER
HOW OFTEN DO YOU HAVE A DRINK CONTAINING ALCOHOL: NEVER

## 2024-09-30 ASSESSMENT — ENCOUNTER SYMPTOMS
LOSS OF SENSATION IN FEET: 0
DEPRESSION: 0
OCCASIONAL FEELINGS OF UNSTEADINESS: 0

## 2024-09-30 ASSESSMENT — PAIN SCALES - GENERAL: PAINLEVEL: 0-NO PAIN

## 2024-09-30 ASSESSMENT — SOCIAL DETERMINANTS OF HEALTH (SDOH)
WITHIN THE LAST YEAR, HAVE YOU BEEN HUMILIATED OR EMOTIONALLY ABUSED IN OTHER WAYS BY YOUR PARTNER OR EX-PARTNER?: NO
WITHIN THE LAST YEAR, HAVE YOU BEEN KICKED, HIT, SLAPPED, OR OTHERWISE PHYSICALLY HURT BY YOUR PARTNER OR EX-PARTNER?: NO
WITHIN THE LAST YEAR, HAVE YOU BEEN AFRAID OF YOUR PARTNER OR EX-PARTNER?: NO
WITHIN THE LAST YEAR, HAVE TO BEEN RAPED OR FORCED TO HAVE ANY KIND OF SEXUAL ACTIVITY BY YOUR PARTNER OR EX-PARTNER?: NO

## 2024-09-30 ASSESSMENT — PATIENT HEALTH QUESTIONNAIRE - PHQ9
1. LITTLE INTEREST OR PLEASURE IN DOING THINGS: NOT AT ALL
SUM OF ALL RESPONSES TO PHQ9 QUESTIONS 1 & 2: 0
2. FEELING DOWN, DEPRESSED OR HOPELESS: NOT AT ALL

## 2024-09-30 NOTE — PROGRESS NOTES
MEDICARE ANNUAL  Subjective   HPI:  Vicky Govea is a 79 y.o. female  No LMP recorded (lmp unknown). Patient is postmenopausal. for menopausal complaints/routine exam.    Chief Complaint:   menopause  Menopausal symptoms:   getting some vaginal dryness, replens burns  HRT:   none  Periods: menopausal  Pain:   none    GYNH:   History of abnormal Pap smear:   no  History of abnormal mammogram:   no    OB History          3    Para   3    Term   3            AB        Living   3         SAB        IAB        Ectopic        Multiple        Live Births   3                  Past Medical History:   Diagnosis Date    Abnormal urine findings 2023    Acute back pain 2023    Acute bronchitis with bronchospasm 2023    Acute kidney injury (CMS-[facility]) 2023    Repeat cbc,cmp    Acute non-recurrent frontal sinusitis 2023    - augmentin twice a day 7 days  -  Nasal saline, increase fluids  -  hand hygiene and infection prevention discussed  -  Warm compress to sinuses  - humidification  - recommend to eat yogurt twice daily while taking antibiotic or a daily probiotic       Acute pharyngitis due to other specified organisms 2019    Acute bacterial pharyngitis    Acute upper respiratory infection, unspecified 2018    Acute URI    Age-related nuclear cataract of left eye 2023    Age-related nuclear cataract of right eye 2023    Allergic contact dermatitis due to plants, except food 2018    Contact dermatitis due to poison oak    Allergic reaction to contrast dye 2023    Angina pectoris (CMS-[facility]) 2023    Arthritis of right knee 2023    Back pain 2023    Benign paroxysmal positional vertigo 2021    Body mass index (BMI) 26.0-26.9, adult 2021    Body mass index (BMI) of 26.0 to 26.9 in adult    Calculus of kidney 2015    Recurrent kidney stones    Carpal tunnel syndrome of left wrist 2023    Carpal tunnel  syndrome of right wrist 05/22/2023    Carpal tunnel syndrome, left upper limb 08/02/2021    Carpal tunnel syndrome of left wrist    Carpal tunnel syndrome, right upper limb 08/02/2021    Carpal tunnel syndrome of right wrist    Cataract, nuclear sclerotic, both eyes 05/22/2023    Cheilitis 09/02/2021    Chronic epigastric pain 09/12/2023    Chronic low back pain 05/22/2023    Colitis 09/12/2023    Contusion of left front wall of thorax, initial encounter 12/15/2019    Contusion of left chest wall, initial encounter    Contusion of right front wall of thorax, initial encounter 12/15/2019    Contusion of right chest wall, initial encounter    Contusion of right hand 05/22/2023    Contusion of right hand, initial encounter 10/13/2021    Contusion of right hand, initial encounter    Difficulty in walking, not elsewhere classified 03/19/2020    Difficulty walking    Difficulty walking 05/22/2023    Diverticulitis of intestine, part unspecified, without perforation or abscess without bleeding 11/15/2018    Acute diverticulitis    Diverticulosis of colon 05/22/2023    Diverticulosis of large intestine without perforation or abscess without bleeding 10/22/2021    Diverticulosis of colon    Dorsalgia, unspecified 09/01/2022    Acute back pain    Dorsalgia, unspecified 11/15/2018    Acute back pain    Drusen (degenerative) of macula, bilateral 05/22/2023    Dry eye syndrome of left lacrimal gland 06/08/2020    Dry eye syndrome of left lacrimal gland    Encounter for follow-up examination after completed treatment for conditions other than malignant neoplasm 03/04/2016    Hospital discharge follow-up    Epigastric pain 03/03/2016    Dyspepsia    Esophageal reflux 05/22/2023    Estrogen deficiency 05/22/2023    Eyelid abnormality 05/22/2023    Gait difficulty 05/22/2023    Gastritis 09/12/2023    Gastro-esophageal reflux disease with esophagitis, without bleeding 10/30/2020    Reflux esophagitis    Generalized abdominal pain  11/19/2018    Acute generalized abdominal pain    Greater trochanteric bursitis of left hip 05/22/2023    Hemangioma of skin and subcutaneous tissue 09/02/2021    Hiatal hernia 05/22/2023    Hip arthritis 09/17/2013    History of total left knee replacement 05/22/2023    Hypermetropia, bilateral 07/08/2022    Hypermetropia of both eyes    Hyperopia of both eyes 05/22/2023    Hypomagnesemia 06/17/2021    Hypomagnesemia    Inflamed seborrheic keratosis 09/02/2021    Injury of finger of right hand 05/22/2023    Left upper quadrant pain 03/03/2016    LUQ pain    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    Lentigines 09/02/2021    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    Lumbar radicular pain 07/10/2013    Mass of joint of left wrist 05/22/2023    Melanocytic nevi of trunk 09/02/2021    Melanocytic nevi of unspecified lower limb, including hip 09/02/2021    Melanocytic nevi of unspecified upper limb, including shoulder 09/02/2021    Menopausal symptom 09/12/2023    Mild vitamin D deficiency 05/22/2023    Muscle spasm of back 05/01/2017    Back muscle spasm    Myalgia due to statin 05/22/2023    Neoplasm of uncertain behavior of skin 09/02/2021    Neurogenic 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 05/22/2023    Ocular rosacea 05/22/2023    Ocular rosacea 05/22/2023    Osteoarthritis, hand 05/22/2023    Osteoarthritis, knee 02/13/2015    Osteopenia 09/12/2023    Other long term (current) drug therapy 07/11/2022    Encounter for long-term current use of high risk medication    Other nail disorders 10/09/2018    Leukonychia striata    Other reduced mobility 03/19/2020    Impaired mobility and ADLs    Other specified abnormal findings of blood chemistry 10/09/2018    Elevated serum creatinine    Other specified abnormal findings of blood chemistry 11/28/2016    Abnormal thyroid blood test    Other specified joint disorders, left wrist 02/08/2021    Mass of joint of left wrist    Other specified postprocedural  states 09/19/2017    History of colonoscopy    Other symptoms and signs involving the musculoskeletal system 07/16/2021    Wrist weakness    Other symptoms and signs involving the musculoskeletal system 10/28/2019    Weakness of both lower extremities    Overweight 10/26/2021    Overweight with body mass index (BMI) of 26 to 26.9 in adult    Pain due to internal orthopedic prosthetic devices, implants and grafts, initial encounter (CMS-[facility]) 01/14/2020    Pain due to total knee replacement, initial encounter    Pain in both hands 05/22/2023    Pain in both wrists 05/22/2023    Pain in left hip 06/25/2021    Pain of left hip joint    Pain in left knee 04/16/2020    Left knee pain    Pain in right hand 07/21/2021    Pain in both hands    Pain in right knee 10/13/2021    Right knee pain, unspecified chronicity    Pain in right shoulder 11/22/2019    Right shoulder pain    Pain in right wrist 07/21/2021    Pain in both wrists    Personal history of diseases of the skin and subcutaneous tissue 06/26/2017    History of urticaria    Personal history of diseases of the skin and subcutaneous tissue 09/06/2018    History of contact dermatitis    Personal history of other diseases of the musculoskeletal system and connective tissue 02/06/2020    History of muscle pain    Personal history of other diseases of the nervous system and sense organs 01/05/2017    History of sleep disturbance    Personal history of other diseases of the respiratory system 05/11/2018    History of acute sinusitis    Personal history of other endocrine, nutritional and metabolic disease 04/27/2020    History of estrogen deficiency    Personal history of other specified conditions 03/03/2016    History of epigastric pain    Personal history of other specified conditions 10/25/2022    History of weakness    Personal history of other specified conditions 12/04/2021    History of vertigo    Personal history of other specified conditions 07/05/2022     History of epigastric pain    Personal history of other specified conditions 10/28/2019    History of gait disorder    Personal history of other specified conditions 01/09/2017    History of fatigue    Personal history of transient ischemic attack (TIA), and cerebral infarction without residual deficits 12/28/2015    History of stroke    Pleurodynia 12/15/2019    Rib pain on left side    Presence of unspecified artificial hip joint 05/15/2020    Status post revision of total hip replacement    Rash of face 05/22/2023    Renal mass, right 05/22/2023    Rib pain on left side 05/22/2023    Rib pain on right side 05/22/2023    Xray chest    Right knee pain 05/22/2023    Right shoulder pain 05/22/2023    Right upper quadrant pain 12/18/2017    Abdominal pain, RUQ    Rosacea 05/22/2023    Scar adherent 05/22/2023    Scar condition and fibrosis of skin 09/02/2021    Scar conditions and fibrosis of skin 07/02/2021    Scar adherent    Seborrheic dermatitis, unspecified 09/02/2021    Spinal stenosis of lumbar region 07/10/2013    Trigger finger, acquired 05/22/2023    Trochanteric bursitis, left hip 05/15/2020    Greater trochanteric bursitis of left hip    Ulcerative blepharitis 05/22/2023    Vitamin D deficiency, unspecified 06/09/2016    Hypovitaminosis D    Weakness of both lower extremities 05/22/2023    Wrist stiffness 05/22/2023    Wrist weakness 05/22/2023       Past Surgical History:   Procedure Laterality Date    HIP SURGERY  12/28/2015    Hip Surgery    HYSTERECTOMY  12/28/2015    Hysterectomy    MR HEAD ANGIO WO IV CONTRAST  12/29/2012    MR HEAD ANGIO WO IV CONTRAST [facility]    MR NECK ANGIO WO IV CONTRAST  12/29/2012    MR NECK ANGIO WO IV CONTRAST [facility]    TOTAL HIP ARTHROPLASTY  12/28/2015    Hip Replacement       Prior to Admission medications    Medication Sig Start Date End Date Taking? Authorizing Provider   amLODIPine (Norvasc) 10 mg tablet Take 1 tablet (10 mg) by mouth once  daily. 9/7/24  Yes NATE Melara-CNP   atorvastatin (Lipitor) 10 mg tablet TAKE 1 TABLET DAILY AT BEDTIME 8/21/24  Yes JOHN Melara   clopidogrel (Plavix) 75 mg tablet Take 1 tablet (75 mg) by mouth once daily. 4/15/24 4/15/25 Yes JOHN Melara   gabapentin (Neurontin) 300 mg capsule Take 1 capsule (300 mg) by mouth once daily at bedtime. 7/10/24 1/6/25 Yes Parker Sotelo MD   lisinopril 20 mg tablet Take 1 tablet (20 mg) by mouth 2 times a day. 7/31/24 7/31/25 Yes NATE Melara-CNP   pantoprazole (ProtoNix) 40 mg EC tablet Take 1 tablet (40 mg) by mouth 2 times a day. 4/15/24 4/15/25 Yes JOHN Melara   calcium carbonate-vitamin D3 500 mg-5 mcg (200 unit) tablet Take 1 tablet by mouth once daily.    Historical Provider, MD   cholecalciferol (Vitamin D-3) 50 mcg (2,000 unit) capsule Take 1 capsule (50 mcg) by mouth early in the morning.. 12/28/15   Historical Provider, MD   cranberry fruit 400 mg tablet Take 1 tablet by mouth once daily. 3/23/17   Historical Provider, MD   hydroCHLOROthiazide (Microzide) 12.5 mg tablet Take 1 tablet (12.5 mg) by mouth once daily. 7/10/24   NATE Melara-BRYAN   hydroxychloroquine (Plaquenil) 200 mg tablet Take 1 tablet (200 mg) by mouth once daily. 7/9/24 10/7/24  Jessica Prado,    hyoscyamine (Anaspaz, Levsin) 0.125 mg tablet Take 1 tablet (0.125 mg) by mouth every 6 hours if needed for cramping or diarrhea. 8/21/24 8/21/25  NATE Murphy-CNP   magnesium oxide-Mg AA chelate 300 mg capsule Take 1 capsule (300 mg) by mouth once daily.    Historical Provider, MD   meclizine (Antivert) 25 mg tablet Take 1 tablet (25 mg) by mouth if needed for dizziness. 6/23/21   Historical Provider, MD   vit A/vit C/vit E/zinc/copper (PRESERVISION AREDS ORAL) Take by mouth. 12/5/22   Historical Provider, MD       Social History     Tobacco Use    Smoking status:  Never    Smokeless tobacco: Never   Vaping Use    Vaping status: Never Used   Substance Use Topics    Alcohol use: Not Currently    Drug use: Never          Objective   /73   Ht 1.524 m (5')   Wt 58.9 kg (129 lb 12.8 oz)   LMP  (LMP Unknown)   BMI 25.35 kg/m²    General:   Alert and oriented, in no acute distress   Neck: Supple. No visible thyromegaly.    Breast/Axilla: Normal to palpation bilaterally without masses, skin changes, or nipple discharge.    Abdomen: Soft, non-tender, without masses or organomegaly   Vulva: Normal architecture without erythema, masses, or lesions.    Vagina: Normal mucosa without lesions, masses. Atrophy present. No abnormal vaginal discharge.    Cervix: Normal without masses, lesions, or signs of cervicitis.    Uterus: Normal mobile, non-enlarged uterus    Adnexa: Normal without masses or lesions   Pelvic Floor No POP noted. No high tone pelvic floor    Psych  Rectal Normal affect. Normal mood.   Normal stool, no masses, guaiac negative     Assessment/Plan   Problem List Items Addressed This Visit             ICD-10-CM    History of hysterectomy Z90.710     Other Visit Diagnoses         Codes    Encounter for annual routine gynecological examination    -  Primary Z01.419    Vaginal atrophy     N95.2    Colon cancer screening     Z12.11    Relevant Orders    POCT occult blood stool manually resulted (Completed)        Coconut oil encouraged prn    OB/GYN Preventive:     - Pap smear indicated every 3-5 years if normal and otherwise low risk   - Self breast exam monthly and clinical breast examination/mammogram yearly   - Screening colonoscopy recommended starting age 45, then Q3-10 years depending on testing and family history; utd   - Osteoporosis prevention discussion included vit D3/calcium supplements, weight-bearing exercise, DEXA 2023  - Genitourinary skin hygiene discussed.  - Diet/Weight management discussed.  - Exercise 30-60 minutes 3-5 times/day    Liana Avendano,  MD

## 2024-10-01 ENCOUNTER — APPOINTMENT (OUTPATIENT)
Dept: PRIMARY CARE | Facility: CLINIC | Age: 79
End: 2024-10-01
Payer: MEDICARE

## 2024-10-01 VITALS
HEIGHT: 60 IN | SYSTOLIC BLOOD PRESSURE: 128 MMHG | WEIGHT: 129.6 LBS | BODY MASS INDEX: 25.45 KG/M2 | OXYGEN SATURATION: 96 % | DIASTOLIC BLOOD PRESSURE: 72 MMHG | HEART RATE: 76 BPM

## 2024-10-01 DIAGNOSIS — I10 BENIGN ESSENTIAL HYPERTENSION: ICD-10-CM

## 2024-10-01 DIAGNOSIS — R73.09 BLOOD GLUCOSE ABNORMAL: ICD-10-CM

## 2024-10-01 DIAGNOSIS — Z00.00 ROUTINE ADULT HEALTH MAINTENANCE: Primary | ICD-10-CM

## 2024-10-01 DIAGNOSIS — M81.0 AGE-RELATED OSTEOPOROSIS WITHOUT CURRENT PATHOLOGICAL FRACTURE: ICD-10-CM

## 2024-10-01 DIAGNOSIS — M15.0 PRIMARY OSTEOARTHRITIS INVOLVING MULTIPLE JOINTS: ICD-10-CM

## 2024-10-01 DIAGNOSIS — R22.31 SKIN LUMP OF ARM, RIGHT: ICD-10-CM

## 2024-10-01 DIAGNOSIS — E55.9 VITAMIN D DEFICIENCY: ICD-10-CM

## 2024-10-01 DIAGNOSIS — Z86.73 HISTORY OF CEREBROVASCULAR ACCIDENT: ICD-10-CM

## 2024-10-01 DIAGNOSIS — J44.1 CHRONIC OBSTRUCTIVE PULMONARY DISEASE WITH ACUTE EXACERBATION (MULTI): ICD-10-CM

## 2024-10-01 PROCEDURE — 1160F RVW MEDS BY RX/DR IN RCRD: CPT | Performed by: NURSE PRACTITIONER

## 2024-10-01 PROCEDURE — 3074F SYST BP LT 130 MM HG: CPT | Performed by: NURSE PRACTITIONER

## 2024-10-01 PROCEDURE — 1036F TOBACCO NON-USER: CPT | Performed by: NURSE PRACTITIONER

## 2024-10-01 PROCEDURE — 1159F MED LIST DOCD IN RCRD: CPT | Performed by: NURSE PRACTITIONER

## 2024-10-01 PROCEDURE — 99214 OFFICE O/P EST MOD 30 MIN: CPT | Performed by: NURSE PRACTITIONER

## 2024-10-01 PROCEDURE — 1157F ADVNC CARE PLAN IN RCRD: CPT | Performed by: NURSE PRACTITIONER

## 2024-10-01 PROCEDURE — 3078F DIAST BP <80 MM HG: CPT | Performed by: NURSE PRACTITIONER

## 2024-10-01 ASSESSMENT — ENCOUNTER SYMPTOMS
FEVER: 0
SEIZURES: 0
ADENOPATHY: 0
WEAKNESS: 0
BRUISES/BLEEDS EASILY: 0
ARTHRALGIAS: 1
ABDOMINAL PAIN: 0
PALPITATIONS: 0
TROUBLE SWALLOWING: 0
SHORTNESS OF BREATH: 0
JOINT SWELLING: 0
HEADACHES: 0
NAUSEA: 0
DIFFICULTY URINATING: 0
WHEEZING: 0
CHILLS: 0
ABDOMINAL DISTENTION: 0
DIZZINESS: 0
COUGH: 0
FATIGUE: 0
DIARRHEA: 0
WOUND: 0
ROS SKIN COMMENTS: SEE HPI
BACK PAIN: 1
COLOR CHANGE: 0
EYE PAIN: 0
MYALGIAS: 0
CONSTIPATION: 0

## 2024-10-01 NOTE — ASSESSMENT & PLAN NOTE
Symptoms appear to be relatively well-controlled with restricted diet.  Provider to be notified for any persistent worsening GI concerns

## 2024-10-01 NOTE — ASSESSMENT & PLAN NOTE
Routine blood work orders placed for November, 2024 for assessment purposes  -Most recent colonoscopy completed in may, 2023 with recommendations that no additional screening colonoscopies are indicated given age and lack of symptoms  -DEXA completed in 2023 and was positive for osteoporosis.  Patient's previous provider recommended increased calcium and vitamin D supplements in addition to weightbearing activities.  Will rescreen patient in 2025  -Most recent mammogram completed yesterday.  Results pending

## 2024-10-01 NOTE — ASSESSMENT & PLAN NOTE
Patient continues on Plavix and statin with good effect.  Provider to be notified for any new neurological concerns

## 2024-10-01 NOTE — ASSESSMENT & PLAN NOTE
Unknown etiology.  Ultrasound ordered today for assessment purposes.  Patient to notify provider for any changes, increased pain or new concerns to site.

## 2024-10-01 NOTE — PATIENT INSTRUCTIONS
Orders are in place to complete an ultrasound of your right arm.  Please contact the radiology department and show that to schedule.  The number is [phone]    Please complete fasting blood work in November of this year.  Orders are in place    Please maintain routine follow-up in 6 months with your primary care provider

## 2024-10-01 NOTE — PROGRESS NOTES
"Subjective   Patient ID: Vicky Govea is a 79 y.o. female who presents for Elbow Problem (Right inside of elbow has a lump on it vascular dr noticed it about 1 month ago not really gotten bigger sometimes painful).    Patient seen today in order to establish primary care.  Patient seen sitting up in office, in no acute distress.  She is alert, oriented and in fair spirits.  Patient voices concerns over a new lump that was brought to her attention several weeks ago on her right arm.  Patient states that she did not notice it before until she followed up with vascular surgery.  The lump is soft and located in her right antecubital area.  She admits to occasional associated discomfort but no persistent pain, irritation, open lesions or drainage.  There is no warmth or other signs of infection.  Patient reports that she did not notice this lump until recently and is unsure as to how long it has been there.  She reports no significant changes in size or consistency since it was brought to her attention.  Patient denies any other skin concerns at this time.  No reported issues with appetite, staying hydrated.  Patient does report IBS-D that only tends to flare if she eats the \"wrong foods \".  Occasional nocturia reported but otherwise patient denies any urinary complaints.  No significant concerns with mood or insomnia.  Patient lives with her spouse and appears to have a good family support system.  She is retired and likes to stay active through playing pickle ball.  No shortness of breath or chest pain with exertion.  Patient monitors her blood pressures routinely at home and brought a log today for provider review.  Readings tend to fluctuate from day-to-day.  Systolic blood pressure ranges anywhere from the 110s to the 170s but is typically in the 120s.  Diastolic blood pressure ranges from the 80s to the 40s.  Patient denies any associated symptoms such as lightheaded nests, dizziness, chest pain/tightness, " headaches, blurred vision or other cardiac concerns.  Patient reports only taking hydrochlorothiazide added as needed for intermittent cough.  Patient follows routinely for multiple specialists, this includes urology, rheumatology, pain management, orthopedic surgery, ophthalmology and gynecology.  She is hoping to undergo a right knee replacement in the near future.  Patient is also due to have cataract surgery and is hoping to have that completed prior to orthopedic surgery.  Patient has multiple joint/chronic pain issues that are being relatively well managed with specialists.  Medications reviewed.  No other acute concerns voiced at this time.           Current Outpatient Medications on File Prior to Visit   Medication Sig Dispense Refill    amLODIPine (Norvasc) 10 mg tablet Take 1 tablet (10 mg) by mouth once daily. 90 tablet 1    atorvastatin (Lipitor) 10 mg tablet TAKE 1 TABLET DAILY AT BEDTIME 90 tablet 3    calcium carbonate-vitamin D3 500 mg-5 mcg (200 unit) tablet Take 1 tablet by mouth once daily.      cholecalciferol (Vitamin D-3) 50 mcg (2,000 unit) capsule Take 1 capsule (50 mcg) by mouth early in the morning..      clopidogrel (Plavix) 75 mg tablet Take 1 tablet (75 mg) by mouth once daily. 90 tablet 3    cranberry fruit 400 mg tablet Take 1 tablet by mouth once daily.      gabapentin (Neurontin) 300 mg capsule Take 1 capsule (300 mg) by mouth once daily at bedtime. 90 capsule 1    hydroCHLOROthiazide (Microzide) 12.5 mg tablet Take 1 tablet (12.5 mg) by mouth once daily. 90 tablet 1    hydroxychloroquine (Plaquenil) 200 mg tablet Take 1 tablet (200 mg) by mouth once daily. 90 tablet 0    hyoscyamine (Anaspaz, Levsin) 0.125 mg tablet Take 1 tablet (0.125 mg) by mouth every 6 hours if needed for cramping or diarrhea. 360 tablet 0    lisinopril 20 mg tablet Take 1 tablet (20 mg) by mouth 2 times a day. 180 tablet 3    magnesium oxide-Mg AA chelate 300 mg capsule Take 1 capsule (300 mg) by mouth once  daily.      meclizine (Antivert) 25 mg tablet Take 1 tablet (25 mg) by mouth if needed for dizziness.      pantoprazole (ProtoNix) 40 mg EC tablet Take 1 tablet (40 mg) by mouth 2 times a day. 180 tablet 3    vit A/vit C/vit E/zinc/copper (PRESERVISION AREDS ORAL) Take by mouth.       No current facility-administered medications on file prior to visit.       Past Medical History:   Diagnosis Date    Abnormal urine findings 05/22/2023    Acute back pain 05/22/2023    Acute bronchitis with bronchospasm 08/26/2023    Acute kidney injury (CMS-HCC) 05/22/2023    Repeat cbc,cmp    Acute non-recurrent frontal sinusitis 08/16/2023    - augmentin twice a day 7 days  -  Nasal saline, increase fluids  -  hand hygiene and infection prevention discussed  -  Warm compress to sinuses  - humidification  - recommend to eat yogurt twice daily while taking antibiotic or a daily probiotic       Acute pharyngitis due to other specified organisms 05/03/2019    Acute bacterial pharyngitis    Acute upper respiratory infection, unspecified 02/06/2018    Acute URI    Age-related nuclear cataract of left eye 05/22/2023    Age-related nuclear cataract of right eye 05/22/2023    Allergic contact dermatitis due to plants, except food 06/11/2018    Contact dermatitis due to poison oak    Allergic reaction to contrast dye 09/12/2023    Angina pectoris 08/16/2023    Arthritis of right knee 05/22/2023    Back pain 09/12/2023    Benign paroxysmal positional vertigo 07/08/2021    Body mass index (BMI) 26.0-26.9, adult 06/17/2021    Body mass index (BMI) of 26.0 to 26.9 in adult    Calculus of kidney 12/28/2015    Recurrent kidney stones    Carpal tunnel syndrome of left wrist 05/22/2023    Carpal tunnel syndrome of right wrist 05/22/2023    Carpal tunnel syndrome, left upper limb 08/02/2021    Carpal tunnel syndrome of left wrist    Carpal tunnel syndrome, right upper limb 08/02/2021    Carpal tunnel syndrome of right wrist    Cataract, nuclear  sclerotic, both eyes 05/22/2023    Cheilitis 09/02/2021    Chronic epigastric pain 09/12/2023    Chronic low back pain 05/22/2023    Colitis 09/12/2023    Contusion of left front wall of thorax, initial encounter 12/15/2019    Contusion of left chest wall, initial encounter    Contusion of right front wall of thorax, initial encounter 12/15/2019    Contusion of right chest wall, initial encounter    Contusion of right hand 05/22/2023    Contusion of right hand, initial encounter 10/13/2021    Contusion of right hand, initial encounter    Difficulty in walking, not elsewhere classified 03/19/2020    Difficulty walking    Difficulty walking 05/22/2023    Diverticulitis of intestine, part unspecified, without perforation or abscess without bleeding 11/15/2018    Acute diverticulitis    Diverticulosis of colon 05/22/2023    Diverticulosis of large intestine without perforation or abscess without bleeding 10/22/2021    Diverticulosis of colon    Dorsalgia, unspecified 09/01/2022    Acute back pain    Dorsalgia, unspecified 11/15/2018    Acute back pain    Drusen (degenerative) of macula, bilateral 05/22/2023    Dry eye syndrome of left lacrimal gland 06/08/2020    Dry eye syndrome of left lacrimal gland    Encounter for follow-up examination after completed treatment for conditions other than malignant neoplasm 03/04/2016    Hospital discharge follow-up    Epigastric pain 03/03/2016    Dyspepsia    Esophageal reflux 05/22/2023    Estrogen deficiency 05/22/2023    Eyelid abnormality 05/22/2023    Gait difficulty 05/22/2023    Gastritis 09/12/2023    Gastro-esophageal reflux disease with esophagitis, without bleeding 10/30/2020    Reflux esophagitis    Generalized abdominal pain 11/19/2018    Acute generalized abdominal pain    Greater trochanteric bursitis of left hip 05/22/2023    Hemangioma of skin and subcutaneous tissue 09/02/2021    Hiatal hernia 05/22/2023    Hip arthritis 09/17/2013    History of total left knee  replacement 05/22/2023    Hypermetropia, bilateral 07/08/2022    Hypermetropia of both eyes    Hyperopia of both eyes 05/22/2023    Hypomagnesemia 06/17/2021    Hypomagnesemia    Inflamed seborrheic keratosis 09/02/2021    Injury of finger of right hand 05/22/2023    Left upper quadrant pain 03/03/2016    LUQ pain    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    Lentigines 09/02/2021    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    Lumbar radicular pain 07/10/2013    Mass of joint of left wrist 05/22/2023    Melanocytic nevi of trunk 09/02/2021    Melanocytic nevi of unspecified lower limb, including hip 09/02/2021    Melanocytic nevi of unspecified upper limb, including shoulder 09/02/2021    Menopausal symptom 09/12/2023    Mild vitamin D deficiency 05/22/2023    Muscle spasm of back 05/01/2017    Back muscle spasm    Myalgia due to statin 05/22/2023    Neoplasm of uncertain behavior of skin 09/02/2021    Neurogenic 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 05/22/2023    Ocular rosacea 05/22/2023    Ocular rosacea 05/22/2023    Osteoarthritis, hand 05/22/2023    Osteoarthritis, knee 02/13/2015    Osteopenia 09/12/2023    Other long term (current) drug therapy 07/11/2022    Encounter for long-term current use of high risk medication    Other nail disorders 10/09/2018    Leukonychia striata    Other reduced mobility 03/19/2020    Impaired mobility and ADLs    Other specified abnormal findings of blood chemistry 10/09/2018    Elevated serum creatinine    Other specified abnormal findings of blood chemistry 11/28/2016    Abnormal thyroid blood test    Other specified joint disorders, left wrist 02/08/2021    Mass of joint of left wrist    Other specified postprocedural states 09/19/2017    History of colonoscopy    Other symptoms and signs involving the musculoskeletal system 07/16/2021    Wrist weakness    Other symptoms and signs involving the musculoskeletal system 10/28/2019    Weakness of both lower  extremities    Overweight 10/26/2021    Overweight with body mass index (BMI) of 26 to 26.9 in adult    Pain due to internal orthopedic prosthetic devices, implants and grafts, initial encounter (CMS-HCC) 01/14/2020    Pain due to total knee replacement, initial encounter    Pain in both hands 05/22/2023    Pain in both wrists 05/22/2023    Pain in left hip 06/25/2021    Pain of left hip joint    Pain in left knee 04/16/2020    Left knee pain    Pain in right hand 07/21/2021    Pain in both hands    Pain in right knee 10/13/2021    Right knee pain, unspecified chronicity    Pain in right shoulder 11/22/2019    Right shoulder pain    Pain in right wrist 07/21/2021    Pain in both wrists    Personal history of diseases of the skin and subcutaneous tissue 06/26/2017    History of urticaria    Personal history of diseases of the skin and subcutaneous tissue 09/06/2018    History of contact dermatitis    Personal history of other diseases of the musculoskeletal system and connective tissue 02/06/2020    History of muscle pain    Personal history of other diseases of the nervous system and sense organs 01/05/2017    History of sleep disturbance    Personal history of other diseases of the respiratory system 05/11/2018    History of acute sinusitis    Personal history of other endocrine, nutritional and metabolic disease 04/27/2020    History of estrogen deficiency    Personal history of other specified conditions 03/03/2016    History of epigastric pain    Personal history of other specified conditions 10/25/2022    History of weakness    Personal history of other specified conditions 12/04/2021    History of vertigo    Personal history of other specified conditions 07/05/2022    History of epigastric pain    Personal history of other specified conditions 10/28/2019    History of gait disorder    Personal history of other specified conditions 01/09/2017    History of fatigue    Personal history of transient ischemic  attack (TIA), and cerebral infarction without residual deficits 12/28/2015    History of stroke    Pleurodynia 12/15/2019    Rib pain on left side    Presence of unspecified artificial hip joint 05/15/2020    Status post revision of total hip replacement    Rash of face 05/22/2023    Renal mass, right 05/22/2023    Rib pain on left side 05/22/2023    Rib pain on right side 05/22/2023    Xray chest    Right knee pain 05/22/2023    Right shoulder pain 05/22/2023    Right upper quadrant pain 12/18/2017    Abdominal pain, RUQ    Rosacea 05/22/2023    Scar adherent 05/22/2023    Scar condition and fibrosis of skin 09/02/2021    Scar conditions and fibrosis of skin 07/02/2021    Scar adherent    Seborrheic dermatitis, unspecified 09/02/2021    Spinal stenosis of lumbar region 07/10/2013    Trigger finger, acquired 05/22/2023    Trochanteric bursitis, left hip 05/15/2020    Greater trochanteric bursitis of left hip    Ulcerative blepharitis 05/22/2023    Vitamin D deficiency, unspecified 06/09/2016    Hypovitaminosis D    Weakness of both lower extremities 05/22/2023    Wrist stiffness 05/22/2023    Wrist weakness 05/22/2023        Past Surgical History:   Procedure Laterality Date    HIP SURGERY  12/28/2015    Hip Surgery    HYSTERECTOMY  12/28/2015    Hysterectomy    MR HEAD ANGIO WO IV CONTRAST  12/29/2012    MR HEAD ANGIO WO IV CONTRAST [facility]    MR NECK ANGIO WO IV CONTRAST  12/29/2012    MR NECK ANGIO WO IV CONTRAST [facility]    TOTAL HIP ARTHROPLASTY  12/28/2015    Hip Replacement        Family History   Problem Relation Name Age of Onset    Diabetes Mother      Other (pacemaker) Father      Breast cancer Other grandmother     Breast cancer Sibling          Review of Systems   Constitutional:  Negative for chills, fatigue and fever.   HENT:  Negative for dental problem and trouble swallowing.    Eyes:  Negative for pain and visual disturbance.        Wears glasses; Positie for cataracts    Respiratory:  Negative for cough, shortness of breath and wheezing.    Cardiovascular:  Negative for chest pain, palpitations and leg swelling.   Gastrointestinal:  Negative for abdominal distention, abdominal pain, constipation, diarrhea and nausea.        Positive for IBS-D   Endocrine: Negative for cold intolerance and heat intolerance.   Genitourinary:  Negative for difficulty urinating.        Positive for nocturia    Musculoskeletal:  Positive for arthralgias and back pain. Negative for gait problem, joint swelling and myalgias.        Positive for polyarthalgia   Skin:  Negative for color change, pallor, rash and wound.        See HPI   Allergic/Immunologic: Negative for environmental allergies and food allergies.   Neurological:  Negative for dizziness, seizures, weakness and headaches.   Hematological:  Negative for adenopathy. Does not bruise/bleed easily.   Psychiatric/Behavioral:  Negative for behavioral problems.    All other systems reviewed and are negative.      Objective   /72   Pulse 76   Ht 1.524 m (5')   Wt 58.8 kg (129 lb 9.6 oz)   LMP  (LMP Unknown)   SpO2 96%   BMI 25.31 kg/m²     Physical Exam  Constitutional:       General: She is not in acute distress.     Appearance: Normal appearance. She is not toxic-appearing.   HENT:      Head: Normocephalic and atraumatic.      Right Ear: Tympanic membrane, ear canal and external ear normal.      Left Ear: Tympanic membrane, ear canal and external ear normal.      Nose: Nose normal.      Mouth/Throat:      Mouth: Mucous membranes are moist.      Pharynx: Oropharynx is clear.   Eyes:      Extraocular Movements: Extraocular movements intact.      Conjunctiva/sclera: Conjunctivae normal.      Pupils: Pupils are equal, round, and reactive to light.   Cardiovascular:      Rate and Rhythm: Normal rate and regular rhythm.      Pulses: Normal pulses.      Heart sounds: Normal heart sounds. No murmur heard.  Pulmonary:      Effort: Pulmonary  effort is normal.      Breath sounds: Normal breath sounds. No wheezing.   Abdominal:      General: Bowel sounds are normal.      Palpations: Abdomen is soft.   Musculoskeletal:         General: No swelling. Normal range of motion.        Arms:       Cervical back: Normal range of motion and neck supple.      Comments: Soft mass between 1-2 inches in diameter   Skin:     General: Skin is warm and dry.      Comments: Positive for soft mass to right AC; no surrounding erythema, warm, open areas or drainage   Neurological:      General: No focal deficit present.      Mental Status: She is alert and oriented to person, place, and time. Mental status is at baseline.      Cranial Nerves: No cranial nerve deficit.      Motor: No weakness.   Psychiatric:         Mood and Affect: Mood normal.         Behavior: Behavior normal.         Thought Content: Thought content normal.         Judgment: Judgment normal.         Assessment/Plan   Problem List Items Addressed This Visit             ICD-10-CM    Benign essential hypertension I10     At home blood pressure readings appear to fluctuate.  She is to continue current medication regiment and monitor blood pressure routinely.  She is to notify provider for any persistent elevations in systolic blood pressure or symptomatic hypotension.  Patient to continue with low-sodium, heart healthy diet.  Provider to be notified for any new cardiac concerns.         Relevant Orders    Comprehensive Metabolic Panel    Lipid Panel    CBC and Auto Differential    COPD (chronic obstructive pulmonary disease) (Multi) J44.9     Reportedly stable off medications.  It appears that she was previously taking Trelegy but stopped due to cost.  Patient to notify provider for any new respiratory concerns.           Age-related osteoporosis without current pathological fracture M81.0    History of cerebrovascular accident Z86.73     Patient continues on Plavix and statin with good effect.  Provider to be  notified for any new neurological concerns         Skin lump of arm, right R22.31     Unknown etiology.  Ultrasound ordered today for assessment purposes.  Patient to notify provider for any changes, increased pain or new concerns to site.         Relevant Orders    US MSK upper extremity joints tendons muscles    Routine adult health maintenance - Primary Z00.00     Routine blood work orders placed for November, 2024 for assessment purposes  -Most recent colonoscopy completed in may, 2023 with recommendations that no additional screening colonoscopies are indicated given age and lack of symptoms  -DEXA completed in 2023 and was positive for osteoporosis.  Patient's previous provider recommended increased calcium and vitamin D supplements in addition to weightbearing activities.  Will rescreen patient in 2025  -Most recent mammogram completed yesterday.  Results pending         Relevant Orders    Hemoglobin A1C    Comprehensive Metabolic Panel    TSH with reflex to Free T4 if abnormal    Lipid Panel    Vitamin D 25-Hydroxy,Total (for eval of Vitamin D levels)    CBC and Auto Differential    Primary osteoarthritis involving multiple joints M15.0     Patient hoping to undergo right knee replacement surgery in the near future.  She is to continue routine follow-up with pain management and rheumatology for continued pain/joint concerns.  OT/OT as appropriate          Other Visit Diagnoses         Codes    Blood glucose abnormal     R73.09    Relevant Orders    Hemoglobin A1C    Vitamin D deficiency     E55.9    Relevant Orders    Vitamin D 25-Hydroxy,Total (for eval of Vitamin D levels)

## 2024-10-01 NOTE — ASSESSMENT & PLAN NOTE
At home blood pressure readings appear to fluctuate.  She is to continue current medication regiment and monitor blood pressure routinely.  She is to notify provider for any persistent elevations in systolic blood pressure or symptomatic hypotension.  Patient to continue with low-sodium, heart healthy diet.  Provider to be notified for any new cardiac concerns.

## 2024-10-01 NOTE — ASSESSMENT & PLAN NOTE
Reportedly stable off medications.  It appears that she was previously taking Trelegy but stopped due to cost.  Patient to notify provider for any new respiratory concerns.

## 2024-10-01 NOTE — ASSESSMENT & PLAN NOTE
Patient hoping to undergo right knee replacement surgery in the near future.  She is to continue routine follow-up with pain management and rheumatology for continued pain/joint concerns.  OT/OT as appropriate

## 2024-10-03 ENCOUNTER — APPOINTMENT (OUTPATIENT)
Dept: RHEUMATOLOGY | Facility: CLINIC | Age: 79
End: 2024-10-03
Payer: MEDICARE

## 2024-10-08 ENCOUNTER — APPOINTMENT (OUTPATIENT)
Dept: PRIMARY CARE | Facility: CLINIC | Age: 79
End: 2024-10-08
Payer: MEDICARE

## 2024-10-09 ENCOUNTER — APPOINTMENT (OUTPATIENT)
Dept: PRIMARY CARE | Facility: CLINIC | Age: 79
End: 2024-10-09
Payer: MEDICARE

## 2024-10-10 ENCOUNTER — LAB (OUTPATIENT)
Dept: LAB | Facility: LAB | Age: 79
End: 2024-10-10
Payer: MEDICARE

## 2024-10-10 DIAGNOSIS — Z79.899 LONG-TERM USE OF PLAQUENIL: ICD-10-CM

## 2024-10-10 DIAGNOSIS — I10 BENIGN ESSENTIAL HYPERTENSION: ICD-10-CM

## 2024-10-10 DIAGNOSIS — R73.09 BLOOD GLUCOSE ABNORMAL: ICD-10-CM

## 2024-10-10 DIAGNOSIS — E55.9 VITAMIN D DEFICIENCY: ICD-10-CM

## 2024-10-10 DIAGNOSIS — Z00.00 ROUTINE ADULT HEALTH MAINTENANCE: ICD-10-CM

## 2024-10-10 LAB
25(OH)D3 SERPL-MCNC: 57 NG/ML (ref 30–100)
ALBUMIN SERPL BCP-MCNC: 4.2 G/DL (ref 3.4–5)
ALP SERPL-CCNC: 51 U/L (ref 33–136)
ALT SERPL W P-5'-P-CCNC: 12 U/L (ref 7–45)
ANION GAP SERPL CALC-SCNC: 17 MMOL/L (ref 10–20)
AST SERPL W P-5'-P-CCNC: 18 U/L (ref 9–39)
BASOPHILS # BLD AUTO: 0.02 X10*3/UL (ref 0–0.1)
BASOPHILS NFR BLD AUTO: 0.2 %
BILIRUB SERPL-MCNC: 0.5 MG/DL (ref 0–1.2)
BUN SERPL-MCNC: 31 MG/DL (ref 6–23)
CALCIUM SERPL-MCNC: 10.2 MG/DL (ref 8.6–10.3)
CHLORIDE SERPL-SCNC: 103 MMOL/L (ref 98–107)
CHOLEST SERPL-MCNC: 185 MG/DL (ref 0–199)
CHOLESTEROL/HDL RATIO: 3.5
CO2 SERPL-SCNC: 25 MMOL/L (ref 21–32)
CREAT SERPL-MCNC: 1.69 MG/DL (ref 0.5–1.05)
EGFRCR SERPLBLD CKD-EPI 2021: 31 ML/MIN/1.73M*2
EOSINOPHIL # BLD AUTO: 0.18 X10*3/UL (ref 0–0.4)
EOSINOPHIL NFR BLD AUTO: 2.2 %
ERYTHROCYTE [DISTWIDTH] IN BLOOD BY AUTOMATED COUNT: 11.9 % (ref 11.5–14.5)
EST. AVERAGE GLUCOSE BLD GHB EST-MCNC: 108 MG/DL
GLUCOSE SERPL-MCNC: 92 MG/DL (ref 74–99)
HBA1C MFR BLD: 5.4 %
HCT VFR BLD AUTO: 39 % (ref 36–46)
HDLC SERPL-MCNC: 53.4 MG/DL
HGB BLD-MCNC: 12 G/DL (ref 12–16)
IMM GRANULOCYTES # BLD AUTO: 0.02 X10*3/UL (ref 0–0.5)
IMM GRANULOCYTES NFR BLD AUTO: 0.2 % (ref 0–0.9)
LDLC SERPL CALC-MCNC: 101 MG/DL
LYMPHOCYTES # BLD AUTO: 1.68 X10*3/UL (ref 0.8–3)
LYMPHOCYTES NFR BLD AUTO: 20.7 %
MCH RBC QN AUTO: 30.2 PG (ref 26–34)
MCHC RBC AUTO-ENTMCNC: 30.8 G/DL (ref 32–36)
MCV RBC AUTO: 98 FL (ref 80–100)
MONOCYTES # BLD AUTO: 0.7 X10*3/UL (ref 0.05–0.8)
MONOCYTES NFR BLD AUTO: 8.6 %
NEUTROPHILS # BLD AUTO: 5.5 X10*3/UL (ref 1.6–5.5)
NEUTROPHILS NFR BLD AUTO: 68.1 %
NON HDL CHOLESTEROL: 132 MG/DL (ref 0–149)
NRBC BLD-RTO: 0 /100 WBCS (ref 0–0)
PLATELET # BLD AUTO: 271 X10*3/UL (ref 150–450)
POTASSIUM SERPL-SCNC: 4.3 MMOL/L (ref 3.5–5.3)
PROT SERPL-MCNC: 6.7 G/DL (ref 6.4–8.2)
RBC # BLD AUTO: 3.98 X10*6/UL (ref 4–5.2)
SODIUM SERPL-SCNC: 141 MMOL/L (ref 136–145)
TRIGL SERPL-MCNC: 153 MG/DL (ref 0–149)
TSH SERPL-ACNC: 1.97 MIU/L (ref 0.44–3.98)
VLDL: 31 MG/DL (ref 0–40)
WBC # BLD AUTO: 8.1 X10*3/UL (ref 4.4–11.3)

## 2024-10-10 PROCEDURE — 36415 COLL VENOUS BLD VENIPUNCTURE: CPT

## 2024-10-10 PROCEDURE — 83036 HEMOGLOBIN GLYCOSYLATED A1C: CPT

## 2024-10-10 PROCEDURE — 82306 VITAMIN D 25 HYDROXY: CPT

## 2024-10-10 RX ORDER — HYDROXYCHLOROQUINE SULFATE 200 MG/1
200 TABLET, FILM COATED ORAL DAILY
Qty: 90 TABLET | Refills: 0 | Status: SHIPPED | OUTPATIENT
Start: 2024-10-10 | End: 2025-01-08

## 2024-10-11 ENCOUNTER — HOSPITAL ENCOUNTER (OUTPATIENT)
Dept: RADIOLOGY | Facility: HOSPITAL | Age: 79
Discharge: HOME | End: 2024-10-11
Payer: MEDICARE

## 2024-10-11 DIAGNOSIS — R22.31 SKIN LUMP OF ARM, RIGHT: ICD-10-CM

## 2024-10-11 PROCEDURE — 76882 US LMTD JT/FCL EVL NVASC XTR: CPT

## 2024-10-18 ENCOUNTER — LAB (OUTPATIENT)
Dept: LAB | Facility: LAB | Age: 79
End: 2024-10-18
Payer: MEDICARE

## 2024-10-18 DIAGNOSIS — Z00.00 ROUTINE ADULT HEALTH MAINTENANCE: ICD-10-CM

## 2024-10-18 LAB
ANION GAP SERPL CALC-SCNC: 16 MMOL/L (ref 10–20)
BUN SERPL-MCNC: 28 MG/DL (ref 6–23)
CALCIUM SERPL-MCNC: 9.1 MG/DL (ref 8.6–10.3)
CHLORIDE SERPL-SCNC: 104 MMOL/L (ref 98–107)
CO2 SERPL-SCNC: 27 MMOL/L (ref 21–32)
CREAT SERPL-MCNC: 1.65 MG/DL (ref 0.5–1.05)
EGFRCR SERPLBLD CKD-EPI 2021: 31 ML/MIN/1.73M*2
GLUCOSE SERPL-MCNC: 102 MG/DL (ref 74–99)
POTASSIUM SERPL-SCNC: 4.1 MMOL/L (ref 3.5–5.3)
SODIUM SERPL-SCNC: 143 MMOL/L (ref 136–145)

## 2024-10-18 PROCEDURE — 36415 COLL VENOUS BLD VENIPUNCTURE: CPT

## 2024-10-18 PROCEDURE — 80048 BASIC METABOLIC PNL TOTAL CA: CPT

## 2024-11-05 RX ORDER — AMOXICILLIN 500 MG/1
TABLET, FILM COATED ORAL
COMMUNITY
Start: 2024-10-02

## 2024-11-07 ENCOUNTER — OFFICE VISIT (OUTPATIENT)
Dept: GASTROENTEROLOGY | Facility: CLINIC | Age: 79
End: 2024-11-07
Payer: MEDICARE

## 2024-11-07 ENCOUNTER — APPOINTMENT (OUTPATIENT)
Dept: RHEUMATOLOGY | Facility: CLINIC | Age: 79
End: 2024-11-07
Payer: MEDICARE

## 2024-11-07 VITALS
OXYGEN SATURATION: 99 % | SYSTOLIC BLOOD PRESSURE: 151 MMHG | WEIGHT: 128.1 LBS | HEIGHT: 60 IN | DIASTOLIC BLOOD PRESSURE: 57 MMHG | HEART RATE: 80 BPM | BODY MASS INDEX: 25.15 KG/M2

## 2024-11-07 DIAGNOSIS — R11.2 NAUSEA AND VOMITING, UNSPECIFIED VOMITING TYPE: ICD-10-CM

## 2024-11-07 DIAGNOSIS — G57.93 NEUROPATHY OF BOTH FEET: ICD-10-CM

## 2024-11-07 DIAGNOSIS — M11.20 PSEUDOGOUT: ICD-10-CM

## 2024-11-07 DIAGNOSIS — K21.9 GASTROESOPHAGEAL REFLUX DISEASE, UNSPECIFIED WHETHER ESOPHAGITIS PRESENT: Primary | ICD-10-CM

## 2024-11-07 DIAGNOSIS — M35.9 UNDIFFERENTIATED CONNECTIVE TISSUE DISEASE (MULTI): Primary | ICD-10-CM

## 2024-11-07 DIAGNOSIS — Z79.899 LONG-TERM USE OF PLAQUENIL: ICD-10-CM

## 2024-11-07 PROCEDURE — 99213 OFFICE O/P EST LOW 20 MIN: CPT

## 2024-11-07 PROCEDURE — G2211 COMPLEX E/M VISIT ADD ON: HCPCS | Performed by: INTERNAL MEDICINE

## 2024-11-07 PROCEDURE — 99213 OFFICE O/P EST LOW 20 MIN: CPT | Performed by: INTERNAL MEDICINE

## 2024-11-07 PROCEDURE — 1157F ADVNC CARE PLAN IN RCRD: CPT | Performed by: INTERNAL MEDICINE

## 2024-11-07 PROCEDURE — 3078F DIAST BP <80 MM HG: CPT | Performed by: INTERNAL MEDICINE

## 2024-11-07 PROCEDURE — 1157F ADVNC CARE PLAN IN RCRD: CPT

## 2024-11-07 PROCEDURE — 1159F MED LIST DOCD IN RCRD: CPT | Performed by: INTERNAL MEDICINE

## 2024-11-07 PROCEDURE — 3077F SYST BP >= 140 MM HG: CPT | Performed by: INTERNAL MEDICINE

## 2024-11-07 RX ORDER — FAMOTIDINE 40 MG/1
40 TABLET, FILM COATED ORAL NIGHTLY PRN
Qty: 30 TABLET | Refills: 11 | Status: SHIPPED | OUTPATIENT
Start: 2024-11-07 | End: 2025-11-07

## 2024-11-07 RX ORDER — ONDANSETRON 4 MG/1
4 TABLET, ORALLY DISINTEGRATING ORAL EVERY 8 HOURS PRN
Qty: 20 TABLET | Refills: 0 | Status: SHIPPED | OUTPATIENT
Start: 2024-11-07 | End: 2024-11-14

## 2024-11-07 RX ORDER — DEXLANSOPRAZOLE 60 MG/1
60 CAPSULE, DELAYED RELEASE ORAL DAILY
Qty: 30 CAPSULE | Refills: 0 | Status: SHIPPED | OUTPATIENT
Start: 2024-11-07 | End: 2024-12-07

## 2024-11-07 ASSESSMENT — ENCOUNTER SYMPTOMS
ABDOMINAL DISTENTION: 0
FEVER: 0
ROS GI COMMENTS: IBS
ABDOMINAL PAIN: 0
BLOOD IN STOOL: 0
DIARRHEA: 1
CONSTIPATION: 0
COUGH: 0
FATIGUE: 0
ANAL BLEEDING: 0
VOMITING: 1
TROUBLE SWALLOWING: 0
APPETITE CHANGE: 0
RECTAL PAIN: 0
FATIGUE: 0
CHILLS: 0
SHORTNESS OF BREATH: 0
NAUSEA: 1
SHORTNESS OF BREATH: 0

## 2024-11-07 NOTE — PATIENT INSTRUCTIONS
Please stop pantoprazole/protonix.  Start Dexilant every morning and Pepcid every evening.  Update me in 30 days.

## 2024-11-07 NOTE — ASSESSMENT & PLAN NOTE
Symptoms correlate with uncontrolled GERD  -Stop pantoprazole and start 30 mg Dexilant every morning +40 mg Pepcid every evening  -As needed Zofran for vomiting  -Consider different PPI if not better in 1 month.  Patient agreeable to provide update at that time

## 2024-11-07 NOTE — PROGRESS NOTES
History Of Present Illness  Vicky Govea is a 79 y.o. female presenting for follow-up visit.  Last seen 10/2023 for GERD, 40 mg Protonix 1-2 times daily.  IBS versus of CAD, consider repeating Xifaxan, Cipro/Flagyl or mesalamine    -7/2023 seen for colitis, nausea vomiting, diarrhea, GERD.  CTE ordered, fecal calprotectin elevated 92, CRP and ESR normal.  Consider SCAD.  Xifaxan prescribed.    -8/2023 CTE showed colonic diverticulosis    Today, patient states her stomach is upset.  Sometimes she starts vomiting and sometimes is getting diarrhea.  Can be at the same time of separate.  Symptoms always occur approximately 1/2-hour or so after eating.  Foods that trigger have been random including grilled chicken sandwich and yogurt.  Taking prn levsin, which slightly helps.  Taking 40 mg protonix 1-2 times daily, does not seem to be working.  Denies NSAID use.   Denies constipation, melena, hematochezia, dysphagia, or unintentional weight loss.    Denies abdominal surgeries    Last colonoscopy: 5/2023 Dr. Herndon: Lipomatous ileocecal valve, sigmoid diverticulosis, hemorrhoids.  No repeat recommended  2018 colonoscopy suggested lymphoid aggregate  Last endoscopy: 5/2023 Dr. Herndon: Erythematous antrum, medium hiatal hernia.  Positive reactive gastropathy     Past Medical History  She has a past medical history of Abnormal urine findings (05/22/2023), Acute back pain (05/22/2023), Acute bronchitis with bronchospasm (08/26/2023), Acute kidney injury (CMS-HCC) (05/22/2023), Acute non-recurrent frontal sinusitis (08/16/2023), Acute pharyngitis due to other specified organisms (05/03/2019), Acute upper respiratory infection, unspecified (02/06/2018), Age-related nuclear cataract of left eye (05/22/2023), Age-related nuclear cataract of right eye (05/22/2023), Allergic contact dermatitis due to plants, except food (06/11/2018), Allergic reaction to contrast dye (09/12/2023), Angina pectoris (08/16/2023), Arthritis of right  knee (05/22/2023), Back pain (09/12/2023), Benign paroxysmal positional vertigo (07/08/2021), Body mass index (BMI) 26.0-26.9, adult (06/17/2021), Calculus of kidney (12/28/2015), Carpal tunnel syndrome of left wrist (05/22/2023), Carpal tunnel syndrome of right wrist (05/22/2023), Carpal tunnel syndrome, left upper limb (08/02/2021), Carpal tunnel syndrome, right upper limb (08/02/2021), Cataract, nuclear sclerotic, both eyes (05/22/2023), Cheilitis (09/02/2021), Chronic epigastric pain (09/12/2023), Chronic low back pain (05/22/2023), Colitis (09/12/2023), Contusion of left front wall of thorax, initial encounter (12/15/2019), Contusion of right front wall of thorax, initial encounter (12/15/2019), Contusion of right hand (05/22/2023), Contusion of right hand, initial encounter (10/13/2021), Difficulty in walking, not elsewhere classified (03/19/2020), Difficulty walking (05/22/2023), Diverticulitis of intestine, part unspecified, without perforation or abscess without bleeding (11/15/2018), Diverticulosis of colon (05/22/2023), Diverticulosis of large intestine without perforation or abscess without bleeding (10/22/2021), Dorsalgia, unspecified (09/01/2022), Dorsalgia, unspecified (11/15/2018), Drusen (degenerative) of macula, bilateral (05/22/2023), Dry eye syndrome of left lacrimal gland (06/08/2020), Encounter for follow-up examination after completed treatment for conditions other than malignant neoplasm (03/04/2016), Epigastric pain (03/03/2016), Esophageal reflux (05/22/2023), Estrogen deficiency (05/22/2023), Eyelid abnormality (05/22/2023), Gait difficulty (05/22/2023), Gastritis (09/12/2023), Gastro-esophageal reflux disease with esophagitis, without bleeding (10/30/2020), Generalized abdominal pain (11/19/2018), Greater trochanteric bursitis of left hip (05/22/2023), Hemangioma of skin and subcutaneous tissue (09/02/2021), Hiatal hernia (05/22/2023), Hip arthritis (09/17/2013), History of total left  knee replacement (05/22/2023), Hypermetropia, bilateral (07/08/2022), Hyperopia of both eyes (05/22/2023), Hypomagnesemia (06/17/2021), Inflamed seborrheic keratosis (09/02/2021), Injury of finger of right hand (05/22/2023), Left upper quadrant pain (03/03/2016), 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), Lentigines (09/02/2021), 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), Lumbar radicular pain (07/10/2013), Mass of joint of left wrist (05/22/2023), Melanocytic nevi of trunk (09/02/2021), Melanocytic nevi of unspecified lower limb, including hip (09/02/2021), Melanocytic nevi of unspecified upper limb, including shoulder (09/02/2021), Menopausal symptom (09/12/2023), Mild vitamin D deficiency (05/22/2023), Muscle spasm of back (05/01/2017), Myalgia due to statin (05/22/2023), Neoplasm of uncertain behavior of skin (09/02/2021), Neurogenic 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 (05/22/2023), Ocular rosacea (05/22/2023), Ocular rosacea (05/22/2023), Osteoarthritis, hand (05/22/2023), Osteoarthritis, knee (02/13/2015), Osteopenia (09/12/2023), Other long term (current) drug therapy (07/11/2022), Other nail disorders (10/09/2018), Other reduced mobility (03/19/2020), Other specified abnormal findings of blood chemistry (10/09/2018), Other specified abnormal findings of blood chemistry (11/28/2016), Other specified joint disorders, left wrist (02/08/2021), Other specified postprocedural states (09/19/2017), Other symptoms and signs involving the musculoskeletal system (07/16/2021), Other symptoms and signs involving the musculoskeletal system (10/28/2019), Overweight (10/26/2021), Pain due to internal orthopedic prosthetic devices, implants and grafts, initial encounter (CMS-[facility]) (01/14/2020), Pain in both hands (05/22/2023), Pain in both wrists (05/22/2023), Pain in left hip (06/25/2021), Pain in left knee (04/16/2020), Pain in right hand (07/21/2021), Pain in right knee (10/13/2021), Pain in right  shoulder (11/22/2019), Pain in right wrist (07/21/2021), Personal history of diseases of the skin and subcutaneous tissue (06/26/2017), Personal history of diseases of the skin and subcutaneous tissue (09/06/2018), Personal history of other diseases of the musculoskeletal system and connective tissue (02/06/2020), Personal history of other diseases of the nervous system and sense organs (01/05/2017), Personal history of other diseases of the respiratory system (05/11/2018), Personal history of other endocrine, nutritional and metabolic disease (04/27/2020), Personal history of other specified conditions (03/03/2016), Personal history of other specified conditions (10/25/2022), Personal history of other specified conditions (12/04/2021), Personal history of other specified conditions (07/05/2022), Personal history of other specified conditions (10/28/2019), Personal history of other specified conditions (01/09/2017), Personal history of transient ischemic attack (TIA), and cerebral infarction without residual deficits (12/28/2015), Pleurodynia (12/15/2019), Presence of unspecified artificial hip joint (05/15/2020), Rash of face (05/22/2023), Renal mass, right (05/22/2023), Rib pain on left side (05/22/2023), Rib pain on right side (05/22/2023), Right knee pain (05/22/2023), Right shoulder pain (05/22/2023), Right upper quadrant pain (12/18/2017), Rosacea (05/22/2023), Scar adherent (05/22/2023), Scar condition and fibrosis of skin (09/02/2021), Scar conditions and fibrosis of skin (07/02/2021), Seborrheic dermatitis, unspecified (09/02/2021), Spinal stenosis of lumbar region (07/10/2013), Trigger finger, acquired (05/22/2023), Trochanteric bursitis, left hip (05/15/2020), 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    Surgical History  She has a past surgical history  that includes Hip surgery (12/28/2015); Hysterectomy (12/28/2015); Total hip arthroplasty (12/28/2015); MR angio head wo IV contrast (12/29/2012); and MR angio neck wo IV contrast (12/29/2012).     Social History  She reports that she has never smoked. She has never used smokeless tobacco. She reports that she does not currently use alcohol. She reports that she does not use drugs.    Family History  Family History   Problem Relation Name Age of Onset    Diabetes Mother      Other (pacemaker) Father      Breast cancer Other grandmother     Breast cancer Sibling          Allergies  Benzocaine, Aspirin, Nsaids (non-steroidal anti-inflammatory drug), Simvastatin, and Tramadol    Review of Systems   Constitutional:  Negative for appetite change, chills, fatigue and fever.   HENT:  Negative for trouble swallowing.    Respiratory:  Negative for cough and shortness of breath.    Gastrointestinal:  Positive for diarrhea, nausea and vomiting. Negative for abdominal distention, abdominal pain, anal bleeding, blood in stool, constipation and rectal pain.        Physical Exam     Last Recorded Vitals  There were no vitals taken for this visit.    Relevant Results  Lab Results   Component Value Date    TSH 1.97 10/10/2024      C-Reactive Protein   Date Value Ref Range Status   05/06/2024 0.15 <1.00 mg/dL Final     Recent Results (from the past 12 weeks)   POCT occult blood stool manually resulted    Collection Time: 09/30/24 11:20 AM   Result Value Ref Range    POC Occult Blood Stool #1 Negative    Hemoglobin A1C    Collection Time: 10/10/24 10:12 AM   Result Value Ref Range    Hemoglobin A1C 5.4 See comment %    Estimated Average Glucose 108 Not Established mg/dL   Comprehensive Metabolic Panel    Collection Time: 10/10/24 10:12 AM   Result Value Ref Range    Glucose 92 74 - 99 mg/dL    Sodium 141 136 - 145 mmol/L    Potassium 4.3 3.5 - 5.3 mmol/L    Chloride 103 98 - 107 mmol/L    Bicarbonate 25 21 - 32 mmol/L    Anion Gap 17  10 - 20 mmol/L    Urea Nitrogen 31 (H) 6 - 23 mg/dL    Creatinine 1.69 (H) 0.50 - 1.05 mg/dL    eGFR 31 (L) >60 mL/min/1.73m*2    Calcium 10.2 8.6 - 10.3 mg/dL    Albumin 4.2 3.4 - 5.0 g/dL    Alkaline Phosphatase 51 33 - 136 U/L    Total Protein 6.7 6.4 - 8.2 g/dL    AST 18 9 - 39 U/L    Bilirubin, Total 0.5 0.0 - 1.2 mg/dL    ALT 12 7 - 45 U/L   TSH with reflex to Free T4 if abnormal    Collection Time: 10/10/24 10:12 AM   Result Value Ref Range    Thyroid Stimulating Hormone 1.97 0.44 - 3.98 mIU/L   Lipid Panel    Collection Time: 10/10/24 10:12 AM   Result Value Ref Range    Cholesterol 185 0 - 199 mg/dL    HDL-Cholesterol 53.4 mg/dL    Cholesterol/HDL Ratio 3.5     LDL Calculated 101 (H) <=99 mg/dL    VLDL 31 0 - 40 mg/dL    Triglycerides 153 (H) 0 - 149 mg/dL    Non HDL Cholesterol 132 0 - 149 mg/dL   Vitamin D 25-Hydroxy,Total (for eval of Vitamin D levels)    Collection Time: 10/10/24 10:12 AM   Result Value Ref Range    Vitamin D, 25-Hydroxy, Total 57 30 - 100 ng/mL   CBC and Auto Differential    Collection Time: 10/10/24 10:12 AM   Result Value Ref Range    WBC 8.1 4.4 - 11.3 x10*3/uL    nRBC 0.0 0.0 - 0.0 /100 WBCs    RBC 3.98 (L) 4.00 - 5.20 x10*6/uL    Hemoglobin 12.0 12.0 - 16.0 g/dL    Hematocrit 39.0 36.0 - 46.0 %    MCV 98 80 - 100 fL    MCH 30.2 26.0 - 34.0 pg    MCHC 30.8 (L) 32.0 - 36.0 g/dL    RDW 11.9 11.5 - 14.5 %    Platelets 271 150 - 450 x10*3/uL    Neutrophils % 68.1 40.0 - 80.0 %    Immature Granulocytes %, Automated 0.2 0.0 - 0.9 %    Lymphocytes % 20.7 13.0 - 44.0 %    Monocytes % 8.6 2.0 - 10.0 %    Eosinophils % 2.2 0.0 - 6.0 %    Basophils % 0.2 0.0 - 2.0 %    Neutrophils Absolute 5.50 1.60 - 5.50 x10*3/uL    Immature Granulocytes Absolute, Automated 0.02 0.00 - 0.50 x10*3/uL    Lymphocytes Absolute 1.68 0.80 - 3.00 x10*3/uL    Monocytes Absolute 0.70 0.05 - 0.80 x10*3/uL    Eosinophils Absolute 0.18 0.00 - 0.40 x10*3/uL    Basophils Absolute 0.02 0.00 - 0.10 x10*3/uL   Basic  metabolic panel    Collection Time: 10/18/24 12:26 PM   Result Value Ref Range    Glucose 102 (H) 74 - 99 mg/dL    Sodium 143 136 - 145 mmol/L    Potassium 4.1 3.5 - 5.3 mmol/L    Chloride 104 98 - 107 mmol/L    Bicarbonate 27 21 - 32 mmol/L    Anion Gap 16 10 - 20 mmol/L    Urea Nitrogen 28 (H) 6 - 23 mg/dL    Creatinine 1.65 (H) 0.50 - 1.05 mg/dL    eGFR 31 (L) >60 mL/min/1.73m*2    Calcium 9.1 8.6 - 10.3 mg/dL       Assessment/Plan     GERD  Symptoms correlate with uncontrolled GERD  -Stop pantoprazole and start 30 mg Dexilant every morning +40 mg Pepcid every evening  -As needed Zofran for vomiting  -Consider different PPI if not better in 1 month.  Patient agreeable to provide update at that time    NATE Murphy-CNP

## 2024-11-07 NOTE — PROGRESS NOTES
Subjective   Patient ID: Vicky Govea is a 79 y.o. female who presents for Follow-up (6 MONTH FOLLOW UP /Stomach upset, indigestion, vomiting this has been on off for about 6 months she is seeing the specialist for GI today  /Patient states she has also been having issues with high bp and low diastolic ).  HPI  Patient with history of positive SUHA, positive anti-double-stranded DNA with antihistone antibody.  Also history of osteoarthritis and chondrocalcinosis seen on her wrist x-rays.    Patient was last seen in May and at that time we had her continue with hydroxychloroquine.  We also added colchicine because she had evidence of chondrocalcinosis in her right wrist.  She only took it for a few days because it caused her GI upset.    She is having some heartburn, cramps and diarrhea and will be seeing gastroenterology.  Her blood pressure has been off and her diastolic number has been low.  She continues to have neuropathy symptoms in her feet and takes as needed gabapentin sometimes 4 days a week.    She will be getting right knee replacement January 13    She did continue with hydroxychloroquine, she was wondering if she can discontinue.  Her numbers at her last blood test were good.    Review of Systems   Constitutional:  Negative for fatigue.   HENT:  Negative for mouth sores.    Eyes:         Checked for plaquenil toxicity.  Also evaluated for MD   Respiratory:  Negative for shortness of breath.    Gastrointestinal:         IBS   Musculoskeletal:         Per HPI   Skin:  Negative for rash.   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    -We will recheck her anti-double-stranded DNA for now we will have her continue with hydroxychloroquine.  She does get her eyes checked regularly.    Did have evidence of chondrocalcinosis on her right wrist.  She was unable to tolerate colchicine.    Neuropathy symptoms in her feet.  She does take gabapentin on as needed basis sometimes 4 days a week.       Will see her again in 6 to 7 months or as needed.    Sheela Ricks MD 11/07/24 11:34 AM

## 2024-11-08 ENCOUNTER — APPOINTMENT (OUTPATIENT)
Dept: OPHTHALMOLOGY | Facility: CLINIC | Age: 79
End: 2024-11-08
Payer: MEDICARE

## 2024-11-08 DIAGNOSIS — H25.13 AGE-RELATED NUCLEAR CATARACT OF BOTH EYES: Primary | ICD-10-CM

## 2024-11-08 PROCEDURE — 99213 OFFICE O/P EST LOW 20 MIN: CPT | Performed by: OPHTHALMOLOGY

## 2024-11-08 ASSESSMENT — REFRACTION_MANIFEST
OD_AXIS: 010
OD_ADD: +2.50
OD_CYLINDER: +1.00
OS_SPHERE: +0.50
OS_AXIS: 146
OS_CYLINDER: +1.00
OD_SPHERE: +1.25
OD_AXIS: 010
OD_CYLINDER: +1.00
METHOD_AUTOREFRACTION: 1
OS_AXIS: 146
OD_SPHERE: +1.00
OS_SPHERE: +0.50
OS_CYLINDER: +0.75
OS_ADD: +2.50

## 2024-11-08 ASSESSMENT — CONF VISUAL FIELD
OS_INFERIOR_TEMPORAL_RESTRICTION: 0
OS_INFERIOR_NASAL_RESTRICTION: 0
OD_INFERIOR_NASAL_RESTRICTION: 0
OD_NORMAL: 1
OS_SUPERIOR_TEMPORAL_RESTRICTION: 0
OS_NORMAL: 1
OD_SUPERIOR_NASAL_RESTRICTION: 0
OD_SUPERIOR_TEMPORAL_RESTRICTION: 0
OD_INFERIOR_TEMPORAL_RESTRICTION: 0
OS_SUPERIOR_NASAL_RESTRICTION: 0

## 2024-11-08 ASSESSMENT — ENCOUNTER SYMPTOMS
MUSCULOSKELETAL NEGATIVE: 0
NEUROLOGICAL NEGATIVE: 0
CONSTITUTIONAL NEGATIVE: 0
CARDIOVASCULAR NEGATIVE: 0
HEMATOLOGIC/LYMPHATIC NEGATIVE: 0
ALLERGIC/IMMUNOLOGIC NEGATIVE: 0
GASTROINTESTINAL NEGATIVE: 0
EYES NEGATIVE: 1
ENDOCRINE NEGATIVE: 0
RESPIRATORY NEGATIVE: 0
PSYCHIATRIC NEGATIVE: 0

## 2024-11-08 ASSESSMENT — REFRACTION_WEARINGRX
OS_AXIS: 170
OD_AXIS: 017
OD_CYLINDER: +1.50
OD_ADD: +2.50
OS_SPHERE: +0.50
OS_ADD: +2.50
OD_SPHERE: +1.50
OS_CYLINDER: +0.75

## 2024-11-08 ASSESSMENT — TONOMETRY
OD_IOP_MMHG: 14
OS_IOP_MMHG: 14
IOP_METHOD: TONOPEN

## 2024-11-08 ASSESSMENT — VISUAL ACUITY
OS_BAT_HIGH: 20/30-
OD_CC: 20/50
OS_CC+: -2
OD_CC+: +2
OD_BAT_HIGH: 20/25
METHOD: SNELLEN - LINEAR
OS_CC: 20/40
CORRECTION_TYPE: GLASSES

## 2024-11-08 ASSESSMENT — CUP TO DISC RATIO
OS_RATIO: .3
OD_RATIO: .3

## 2024-11-08 ASSESSMENT — SLIT LAMP EXAM - LIDS
COMMENTS: GOOD POSITION, TRACE MGD
COMMENTS: GOOD POSITION, TRACE MGD

## 2024-11-08 ASSESSMENT — EXTERNAL EXAM - RIGHT EYE: OD_EXAM: NORMAL

## 2024-11-08 ASSESSMENT — EXTERNAL EXAM - LEFT EYE: OS_EXAM: NORMAL

## 2024-11-08 NOTE — PROGRESS NOTES
Assessment/Plan   Diagnoses and all orders for this visit:  Age-related nuclear cataract of both eyes    She does have Visually significant cataract with glare at night, but she still doesn't qualify for surgery as she corrects to 20/20 right eye and 20/30 OS, BAT 20/25  20/30    Plan:  New glasses prescription given  See in 6 months to reassess with BAT and DFE.

## 2024-11-11 ENCOUNTER — LAB (OUTPATIENT)
Dept: LAB | Facility: LAB | Age: 79
End: 2024-11-11
Payer: MEDICARE

## 2024-11-11 ENCOUNTER — APPOINTMENT (OUTPATIENT)
Dept: PRIMARY CARE | Facility: CLINIC | Age: 79
End: 2024-11-11
Payer: MEDICARE

## 2024-11-11 VITALS
WEIGHT: 129.8 LBS | DIASTOLIC BLOOD PRESSURE: 60 MMHG | OXYGEN SATURATION: 97 % | SYSTOLIC BLOOD PRESSURE: 118 MMHG | HEART RATE: 72 BPM | HEIGHT: 60 IN | BODY MASS INDEX: 25.48 KG/M2

## 2024-11-11 DIAGNOSIS — G57.93 NEUROPATHY OF BOTH FEET: ICD-10-CM

## 2024-11-11 DIAGNOSIS — Z86.73 HISTORY OF CEREBROVASCULAR ACCIDENT: ICD-10-CM

## 2024-11-11 DIAGNOSIS — N18.9 CHRONIC KIDNEY DISEASE, UNSPECIFIED CKD STAGE: ICD-10-CM

## 2024-11-11 DIAGNOSIS — K58.0 IRRITABLE BOWEL SYNDROME WITH DIARRHEA: ICD-10-CM

## 2024-11-11 DIAGNOSIS — K21.9 GASTROESOPHAGEAL REFLUX DISEASE, UNSPECIFIED WHETHER ESOPHAGITIS PRESENT: Primary | ICD-10-CM

## 2024-11-11 DIAGNOSIS — M11.20 PSEUDOGOUT: ICD-10-CM

## 2024-11-11 DIAGNOSIS — M15.0 PRIMARY OSTEOARTHRITIS INVOLVING MULTIPLE JOINTS: ICD-10-CM

## 2024-11-11 DIAGNOSIS — I10 BENIGN ESSENTIAL HYPERTENSION: ICD-10-CM

## 2024-11-11 DIAGNOSIS — Z79.899 LONG-TERM USE OF PLAQUENIL: ICD-10-CM

## 2024-11-11 DIAGNOSIS — M35.9 UNDIFFERENTIATED CONNECTIVE TISSUE DISEASE (MULTI): ICD-10-CM

## 2024-11-11 DIAGNOSIS — Z00.00 ROUTINE ADULT HEALTH MAINTENANCE: ICD-10-CM

## 2024-11-11 DIAGNOSIS — M32.9 SYSTEMIC LUPUS ERYTHEMATOSUS, UNSPECIFIED SLE TYPE, UNSPECIFIED ORGAN INVOLVEMENT STATUS (MULTI): ICD-10-CM

## 2024-11-11 LAB
CRP SERPL-MCNC: 0.13 MG/DL
ERYTHROCYTE [SEDIMENTATION RATE] IN BLOOD BY WESTERGREN METHOD: 4 MM/H (ref 0–30)

## 2024-11-11 PROCEDURE — 86140 C-REACTIVE PROTEIN: CPT

## 2024-11-11 PROCEDURE — 1159F MED LIST DOCD IN RCRD: CPT | Performed by: NURSE PRACTITIONER

## 2024-11-11 PROCEDURE — 3074F SYST BP LT 130 MM HG: CPT | Performed by: NURSE PRACTITIONER

## 2024-11-11 PROCEDURE — G2211 COMPLEX E/M VISIT ADD ON: HCPCS | Performed by: NURSE PRACTITIONER

## 2024-11-11 PROCEDURE — 99213 OFFICE O/P EST LOW 20 MIN: CPT | Performed by: NURSE PRACTITIONER

## 2024-11-11 PROCEDURE — 1160F RVW MEDS BY RX/DR IN RCRD: CPT | Performed by: NURSE PRACTITIONER

## 2024-11-11 PROCEDURE — 1157F ADVNC CARE PLAN IN RCRD: CPT | Performed by: NURSE PRACTITIONER

## 2024-11-11 PROCEDURE — 36415 COLL VENOUS BLD VENIPUNCTURE: CPT

## 2024-11-11 PROCEDURE — 3078F DIAST BP <80 MM HG: CPT | Performed by: NURSE PRACTITIONER

## 2024-11-11 PROCEDURE — 86225 DNA ANTIBODY NATIVE: CPT

## 2024-11-11 PROCEDURE — 85652 RBC SED RATE AUTOMATED: CPT

## 2024-11-11 ASSESSMENT — ENCOUNTER SYMPTOMS
SEIZURES: 0
ABDOMINAL PAIN: 0
WEAKNESS: 0
WOUND: 0
WHEEZING: 0
EYE PAIN: 0
JOINT SWELLING: 0
FATIGUE: 0
BACK PAIN: 1
COLOR CHANGE: 0
DIFFICULTY URINATING: 0
CHILLS: 0
HEADACHES: 0
SHORTNESS OF BREATH: 0
ABDOMINAL DISTENTION: 0
ADENOPATHY: 0
COUGH: 0
ARTHRALGIAS: 1
MYALGIAS: 0
BRUISES/BLEEDS EASILY: 0
DIZZINESS: 0
CONSTIPATION: 0
FEVER: 0
TROUBLE SWALLOWING: 0
DIARRHEA: 0
NAUSEA: 0
PALPITATIONS: 0

## 2024-11-11 NOTE — PROGRESS NOTES
"Subjective   Patient ID: Vicky Govea is a 79 y.o. female who presents for Follow-up (Blood pressure and kidneys).    Patient seen today for medication management and routine follow-up.  Patient monitors her blood pressures routinely at home and brought a log today for provider review.  Readings tend to fluctuate from day-to-day but her systolic blood pressures typically in the 130s -140s range.  Patient denies any associated symptoms such as lightheaded nests, dizziness, chest pain/tightness, headaches, blurred vision or other cardiac concerns.  Patient reports only taking hydrochlorothiazide added as needed. She is retired and likes to stay active through playing [employer].  No shortness of breath or chest pain with exertion.    No reported issues with appetite, staying hydrated.  Patient does report IBS-D that only tends to flare if she eats the \"wrong foods \".  She does follow routinely with gastroenterology for IBS and GERD, who prescribed her dexlansoprazole for symptom control however she has not started it yet.  Patient is voicing concerns as a potential side effect is worsening of lupus.  Patient is agreeable to pharmacy consult for additional recommendations regarding initiating this new medication. Occasional nocturia reported but otherwise patient denies any urinary complaints.  No significant concerns with mood or insomnia.  Patient lives with her spouse and appears to have a good family support system.  Patient follows routinely for multiple specialists, this includes urology, rheumatology, pain management, orthopedic surgery, ophthalmology and gynecology.  Patient has multiple joint/chronic pain issues that are being relatively well managed with specialists.  Medications reviewed.  No other acute concerns voiced at this time.           Current Outpatient Medications on File Prior to Visit   Medication Sig Dispense Refill    amLODIPine (Norvasc) 10 mg tablet Take 1 tablet (10 mg) by mouth once " daily. 90 tablet 1    atorvastatin (Lipitor) 10 mg tablet TAKE 1 TABLET DAILY AT BEDTIME 90 tablet 3    calcium carbonate-vitamin D3 500 mg-5 mcg (200 unit) tablet Take 1 tablet by mouth once daily.      clopidogrel (Plavix) 75 mg tablet Take 1 tablet (75 mg) by mouth once daily. 90 tablet 3    cranberry fruit 400 mg tablet Take 1 tablet by mouth once daily.      dexlansoprazole (Dexilant) 60 mg DR capsule Take 1 capsule (60 mg) by mouth once daily. Do not crush or chew. 30 capsule 0    famotidine (Pepcid) 40 mg tablet Take 1 tablet (40 mg) by mouth as needed at bedtime for heartburn. 30 tablet 11    gabapentin (Neurontin) 300 mg capsule Take 1 capsule (300 mg) by mouth once daily at bedtime. 90 capsule 1    hydroxychloroquine (Plaquenil) 200 mg tablet Take 1 tablet (200 mg) by mouth once daily. 90 tablet 0    hyoscyamine (Anaspaz, Levsin) 0.125 mg tablet Take 1 tablet (0.125 mg) by mouth every 6 hours if needed for cramping or diarrhea. 360 tablet 0    lisinopril 20 mg tablet Take 1 tablet (20 mg) by mouth 2 times a day. 180 tablet 3    magnesium oxide-Mg AA chelate 300 mg capsule Take 1 capsule (300 mg) by mouth once daily.      [] ondansetron ODT (Zofran-ODT) 4 mg disintegrating tablet Take 1 tablet (4 mg) by mouth every 8 hours if needed for nausea or vomiting for up to 7 days. 20 tablet 0    vit A/vit C/vit E/zinc/copper (PRESERVISION AREDS ORAL) Take by mouth.      cholecalciferol (Vitamin D-3) 50 mcg (2,000 unit) capsule Take 1 capsule (50 mcg) by mouth early in the morning..      [DISCONTINUED] amoxicillin (Amoxil) 500 mg tablet TAKE 4 TABLETS ONE HOUR PRIOR TO APPOINTMENT      [DISCONTINUED] hydroCHLOROthiazide (Microzide) 12.5 mg tablet Take 1 tablet (12.5 mg) by mouth once daily. 90 tablet 1    [DISCONTINUED] meclizine (Antivert) 25 mg tablet Take 1 tablet (25 mg) by mouth if needed for dizziness. (Patient not taking: Reported on 2024)       No current facility-administered medications on  file prior to visit.       Past Medical History:   Diagnosis Date    Abnormal urine findings 05/22/2023    Acute back pain 05/22/2023    Acute bronchitis with bronchospasm 08/26/2023    Acute kidney injury (CMS-HCC) 05/22/2023    Repeat cbc,cmp    Acute non-recurrent frontal sinusitis 08/16/2023    - augmentin twice a day 7 days  -  Nasal saline, increase fluids  -  hand hygiene and infection prevention discussed  -  Warm compress to sinuses  - humidification  - recommend to eat yogurt twice daily while taking antibiotic or a daily probiotic       Acute pharyngitis due to other specified organisms 05/03/2019    Acute bacterial pharyngitis    Acute upper respiratory infection, unspecified 02/06/2018    Acute URI    Age-related nuclear cataract of left eye 05/22/2023    Age-related nuclear cataract of right eye 05/22/2023    Allergic contact dermatitis due to plants, except food 06/11/2018    Contact dermatitis due to poison oak    Allergic reaction to contrast dye 09/12/2023    Angina pectoris 08/16/2023    Arthritis of right knee 05/22/2023    Back pain 09/12/2023    Benign paroxysmal positional vertigo 07/08/2021    Body mass index (BMI) 26.0-26.9, adult 06/17/2021    Body mass index (BMI) of 26.0 to 26.9 in adult    Calculus of kidney 12/28/2015    Recurrent kidney stones    Carpal tunnel syndrome of left wrist 05/22/2023    Carpal tunnel syndrome of right wrist 05/22/2023    Carpal tunnel syndrome, left upper limb 08/02/2021    Carpal tunnel syndrome of left wrist    Carpal tunnel syndrome, right upper limb 08/02/2021    Carpal tunnel syndrome of right wrist    Cataract, nuclear sclerotic, both eyes 05/22/2023    Cheilitis 09/02/2021    Chronic epigastric pain 09/12/2023    Chronic low back pain 05/22/2023    Colitis 09/12/2023    Contusion of left front wall of thorax, initial encounter 12/15/2019    Contusion of left chest wall, initial encounter    Contusion of right front wall of thorax, initial encounter  12/15/2019    Contusion of right chest wall, initial encounter    Contusion of right hand 05/22/2023    Contusion of right hand, initial encounter 10/13/2021    Contusion of right hand, initial encounter    Difficulty in walking, not elsewhere classified 03/19/2020    Difficulty walking    Difficulty walking 05/22/2023    Diverticulitis of intestine, part unspecified, without perforation or abscess without bleeding 11/15/2018    Acute diverticulitis    Diverticulosis of colon 05/22/2023    Diverticulosis of large intestine without perforation or abscess without bleeding 10/22/2021    Diverticulosis of colon    Dorsalgia, unspecified 09/01/2022    Acute back pain    Dorsalgia, unspecified 11/15/2018    Acute back pain    Drusen (degenerative) of macula, bilateral 05/22/2023    Dry eye syndrome of left lacrimal gland 06/08/2020    Dry eye syndrome of left lacrimal gland    Encounter for follow-up examination after completed treatment for conditions other than malignant neoplasm 03/04/2016    Hospital discharge follow-up    Epigastric pain 03/03/2016    Dyspepsia    Esophageal reflux 05/22/2023    Estrogen deficiency 05/22/2023    Eyelid abnormality 05/22/2023    Gait difficulty 05/22/2023    Gastritis 09/12/2023    Gastro-esophageal reflux disease with esophagitis, without bleeding 10/30/2020    Reflux esophagitis    Generalized abdominal pain 11/19/2018    Acute generalized abdominal pain    Greater trochanteric bursitis of left hip 05/22/2023    Hemangioma of skin and subcutaneous tissue 09/02/2021    Hiatal hernia 05/22/2023    Hip arthritis 09/17/2013    History of total left knee replacement 05/22/2023    Hypermetropia, bilateral 07/08/2022    Hypermetropia of both eyes    Hyperopia of both eyes 05/22/2023    Hypomagnesemia 06/17/2021    Hypomagnesemia    Inflamed seborrheic keratosis 09/02/2021    Injury of finger of right hand 05/22/2023    Left upper quadrant pain 03/03/2016    LUQ pain    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    Lentigines 09/02/2021    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    Lumbar radicular pain 07/10/2013    Mass of joint of left wrist 05/22/2023    Melanocytic nevi of trunk 09/02/2021    Melanocytic nevi of unspecified lower limb, including hip 09/02/2021    Melanocytic nevi of unspecified upper limb, including shoulder 09/02/2021    Menopausal symptom 09/12/2023    Mild vitamin D deficiency 05/22/2023    Muscle spasm of back 05/01/2017    Back muscle spasm    Myalgia due to statin 05/22/2023    Neoplasm of uncertain behavior of skin 09/02/2021    Neurogenic 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 05/22/2023    Ocular rosacea 05/22/2023    Ocular rosacea 05/22/2023    Osteoarthritis, hand 05/22/2023    Osteoarthritis, knee 02/13/2015    Osteopenia 09/12/2023    Other long term (current) drug therapy 07/11/2022    Encounter for long-term current use of high risk medication    Other nail disorders 10/09/2018    Leukonychia striata    Other reduced mobility 03/19/2020    Impaired mobility and ADLs    Other specified abnormal findings of blood chemistry 10/09/2018    Elevated serum creatinine    Other specified abnormal findings of blood chemistry 11/28/2016    Abnormal thyroid blood test    Other specified joint disorders, left wrist 02/08/2021    Mass of joint of left wrist    Other specified postprocedural states 09/19/2017    History of colonoscopy    Other symptoms and signs involving the musculoskeletal system 07/16/2021    Wrist weakness    Other symptoms and signs involving the musculoskeletal system 10/28/2019    Weakness of both lower extremities    Overweight 10/26/2021    Overweight with body mass index (BMI) of 26 to 26.9 in adult    Pain due to internal orthopedic prosthetic devices, implants and grafts, initial encounter (CMS-HCC) 01/14/2020    Pain due to total knee replacement, initial encounter    Pain in both hands 05/22/2023    Pain in both wrists  05/22/2023    Pain in left hip 06/25/2021    Pain of left hip joint    Pain in left knee 04/16/2020    Left knee pain    Pain in right hand 07/21/2021    Pain in both hands    Pain in right knee 10/13/2021    Right knee pain, unspecified chronicity    Pain in right shoulder 11/22/2019    Right shoulder pain    Pain in right wrist 07/21/2021    Pain in both wrists    Personal history of diseases of the skin and subcutaneous tissue 06/26/2017    History of urticaria    Personal history of diseases of the skin and subcutaneous tissue 09/06/2018    History of contact dermatitis    Personal history of other diseases of the musculoskeletal system and connective tissue 02/06/2020    History of muscle pain    Personal history of other diseases of the nervous system and sense organs 01/05/2017    History of sleep disturbance    Personal history of other diseases of the respiratory system 05/11/2018    History of acute sinusitis    Personal history of other endocrine, nutritional and metabolic disease 04/27/2020    History of estrogen deficiency    Personal history of other specified conditions 03/03/2016    History of epigastric pain    Personal history of other specified conditions 10/25/2022    History of weakness    Personal history of other specified conditions 12/04/2021    History of vertigo    Personal history of other specified conditions 07/05/2022    History of epigastric pain    Personal history of other specified conditions 10/28/2019    History of gait disorder    Personal history of other specified conditions 01/09/2017    History of fatigue    Personal history of transient ischemic attack (TIA), and cerebral infarction without residual deficits 12/28/2015    History of stroke    Pleurodynia 12/15/2019    Rib pain on left side    Presence of unspecified artificial hip joint 05/15/2020    Status post revision of total hip replacement    Rash of face 05/22/2023    Renal mass, right 05/22/2023    Rib pain on left  side 05/22/2023    Rib pain on right side 05/22/2023    Xray chest    Right knee pain 05/22/2023    Right shoulder pain 05/22/2023    Right upper quadrant pain 12/18/2017    Abdominal pain, RUQ    Rosacea 05/22/2023    Scar adherent 05/22/2023    Scar condition and fibrosis of skin 09/02/2021    Scar conditions and fibrosis of skin 07/02/2021    Scar adherent    Seborrheic dermatitis, unspecified 09/02/2021    Spinal stenosis of lumbar region 07/10/2013    Trigger finger, acquired 05/22/2023    Trochanteric bursitis, left hip 05/15/2020    Greater trochanteric bursitis of left hip    Ulcerative blepharitis 05/22/2023    Vitamin D deficiency, unspecified 06/09/2016    Hypovitaminosis D    Weakness of both lower extremities 05/22/2023    Wrist stiffness 05/22/2023    Wrist weakness 05/22/2023        Past Surgical History:   Procedure Laterality Date    HIP SURGERY  12/28/2015    Hip Surgery    HYSTERECTOMY  12/28/2015    Hysterectomy    MR HEAD ANGIO WO IV CONTRAST  12/29/2012    MR HEAD ANGIO WO IV CONTRAST [facility]    MR NECK ANGIO WO IV CONTRAST  12/29/2012    MR NECK ANGIO WO IV CONTRAST [facility]    TOTAL HIP ARTHROPLASTY  12/28/2015    Hip Replacement        Family History   Problem Relation Name Age of Onset    Diabetes Mother      Other (pacemaker) Father      Breast cancer Other grandmother     Breast cancer Sibling          Review of Systems   Constitutional:  Negative for chills, fatigue and fever.   HENT:  Negative for dental problem and trouble swallowing.    Eyes:  Negative for pain and visual disturbance.        Wears glasses; Positie for cataracts   Respiratory:  Negative for cough, shortness of breath and wheezing.    Cardiovascular:  Negative for chest pain, palpitations and leg swelling.   Gastrointestinal:  Negative for abdominal distention, abdominal pain, constipation, diarrhea and nausea.        Positive for IBS-D   Endocrine: Negative for cold intolerance and heat  intolerance.   Genitourinary:  Negative for difficulty urinating.        Positive for nocturia    Musculoskeletal:  Positive for arthralgias and back pain. Negative for gait problem, joint swelling and myalgias.        Positive for polyarthalgia   Skin:  Negative for color change, pallor, rash and wound.   Allergic/Immunologic: Negative for environmental allergies and food allergies.   Neurological:  Negative for dizziness, seizures, weakness and headaches.   Hematological:  Negative for adenopathy. Does not bruise/bleed easily.   Psychiatric/Behavioral:  Negative for behavioral problems.    All other systems reviewed and are negative.      Objective   /60   Pulse 72   Ht 1.524 m (5')   Wt 58.9 kg (129 lb 12.8 oz)   LMP  (LMP Unknown)   SpO2 97%   BMI 25.35 kg/m²     Physical Exam  Constitutional:       General: She is not in acute distress.     Appearance: Normal appearance. She is not toxic-appearing.   HENT:      Head: Normocephalic and atraumatic.      Right Ear: External ear normal.      Left Ear: External ear normal.      Nose: Nose normal.      Mouth/Throat:      Mouth: Mucous membranes are moist.      Pharynx: Oropharynx is clear.   Eyes:      Extraocular Movements: Extraocular movements intact.      Conjunctiva/sclera: Conjunctivae normal.   Cardiovascular:      Rate and Rhythm: Normal rate and regular rhythm.      Pulses: Normal pulses.      Heart sounds: Normal heart sounds. No murmur heard.  Pulmonary:      Effort: Pulmonary effort is normal.      Breath sounds: Normal breath sounds. No wheezing.   Abdominal:      General: Bowel sounds are normal.      Palpations: Abdomen is soft.   Musculoskeletal:         General: No swelling. Normal range of motion.      Cervical back: Normal range of motion and neck supple.   Skin:     General: Skin is warm and dry.   Neurological:      General: No focal deficit present.      Mental Status: She is alert and oriented to person, place, and time. Mental  status is at baseline.      Cranial Nerves: No cranial nerve deficit.      Motor: No weakness.   Psychiatric:         Mood and Affect: Mood normal.         Behavior: Behavior normal.         Thought Content: Thought content normal.         Judgment: Judgment normal.         Assessment/Plan   Problem List Items Addressed This Visit             ICD-10-CM    Benign essential hypertension I10     Patient to continue current medication regiment.  She is also to continue to monitor her blood pressures routinely at home.  She is to notify provider for any persistent elevations in systolic blood pressure or symptomatic hypotension.  Patient to continue with low-sodium, heart healthy diet.  Provider to be notified for any new cardiac concerns.         Irritable bowel syndrome with diarrhea K58.0     Symptoms appear to be relatively well-controlled with restricted diet.  Provider to be notified for any persistent worsening GI concerns         GERD (gastroesophageal reflux disease) - Primary K21.9     Pharmacy consult placed for additional guidance regarding new medication/interaction with lupus.  Patient continues on previous PPI at this time.         Relevant Orders    Referral to Clinical Pharmacy    History of cerebrovascular accident Z86.73     Patient continues on Plavix and statin with good effect.  Provider to be notified for any new neurological concerns         Routine adult health maintenance Z00.00     Routine blood work orders placed for November, 2024 for assessment purposes  -Most recent colonoscopy completed in may, 2023 with recommendations that no additional screening colonoscopies are indicated given age and lack of symptoms  -DEXA completed in 2023 and was positive for osteoporosis.  Patient's previous provider recommended increased calcium and vitamin D supplements in addition to weightbearing activities.  Will rescreen patient in 2025         Primary osteoarthritis involving multiple joints M15.0      Patient hoping to undergo right knee replacement surgery in the near future.  She is to continue routine follow-up with pain management and rheumatology for continued pain/joint concerns.  OT/OT as appropriate          Other Visit Diagnoses         Codes    Systemic lupus erythematosus, unspecified SLE type, unspecified organ involvement status (Multi)     M32.9    Relevant Orders    Referral to Clinical Pharmacy    Chronic kidney disease, unspecified CKD stage     N18.9    Relevant Orders    Basic metabolic panel

## 2024-11-11 NOTE — PATIENT INSTRUCTIONS
Monitor your blood pressure at least once daily. Keep a log for provider review. Call the office in one week with your readings. Please call the office before than if the systolic blood pressure (top number) is consistently greater than 140 or the diastolic blood pressure (bottom number) is consistently greater than 90.    Call 911 or emergency medical services if your blood pressure is 180/120 mm Hg or greater and you have chest pain, shortness of breath, or symptoms of stroke. Stroke symptoms include numbness or tingling, trouble speaking, or changes in vision

## 2024-11-12 LAB — DSDNA AB SER-ACNC: 1 IU/ML

## 2024-11-15 ENCOUNTER — TELEMEDICINE (OUTPATIENT)
Dept: PHARMACY | Facility: HOSPITAL | Age: 79
End: 2024-11-15
Payer: MEDICARE

## 2024-11-15 DIAGNOSIS — M32.9 SYSTEMIC LUPUS ERYTHEMATOSUS, UNSPECIFIED SLE TYPE, UNSPECIFIED ORGAN INVOLVEMENT STATUS (MULTI): ICD-10-CM

## 2024-11-15 DIAGNOSIS — K21.9 GASTROESOPHAGEAL REFLUX DISEASE, UNSPECIFIED WHETHER ESOPHAGITIS PRESENT: ICD-10-CM

## 2024-11-15 NOTE — PROGRESS NOTES
Outpatient Clinical Pharmacy Visit  Vicky Govea is a 79 y.o. female who was referred to the ambulatory Clinical Pharmacy Team for their Drug-Induced Lupus and GERD.    Referring Provider: NATE Anderson-CNP    Subjective   Allergies   Allergen Reactions    Benzocaine Other    Aspirin Other    Nsaids (Non-Steroidal Anti-Inflammatory Drug) Other     Pt stated she should not take while on plavix    Simvastatin Myalgia    Tramadol Other and Rash     HPI  Patient has relevant history of Drug-Induced Lupus, with history of positive SUHA, positive anti-double-stranded DNA with antihistone antibody. Also has history of osteoarthritis and chondrocalcinosis. Pertinent medical history of heartburn, cramps, and diarrhea and is currently following with gastroenterology (Chelsey Gong CNP) for severe heartburn/GERD and other gastrointestinal concerns.     Per patient, previous unknown provider has suspected omeprazole as the cause of patient's Drug-Induced Lupus. Patient estimates she last took omeprazole in 2018, and is currently being maintained on pantoprazole 40 mg once daily. Patient endorses pantoprazole sometimes working and sometimes not working. Per patient, gastroenterology would like to trial dexlansoprazole to seek any additional benefit, however, patient is concerned for any risk of Drug-Induced Lupus with this medication switch. Denies every having any flare-ups with current proton-pump inhibitor (PPI), pantoprazole.     Current Medications  Hydroxychloroquine 200 mg daily  Pantoprazole 40 mg daily in the morning  Dexlansoprazole 60 mg daily- recently prescribed, not yet taking  Famotidine 40 mg nightly PRN    Previous Medications  Omeprazole- Attempted in the past for GERD, however, thought to have caused patient's Drug-Induced Lupus    Discussion- DIL  Drug-induced lupus erythematosus (DIL), has been linked to several classes of medications:  Medications likely associated: procainamide, hydralazine,  isoniazid, quinidine, minocycline, penicillamine, methyldopa, chlorpromazine (list not exhaustive)  Medications possibly associated: Interferon-alpha, phenytoin, sulfasalazine, hydrochlorothiazide, terbinafine, calcium-channel blockers, NSAIDS, antiarrhythmics, PPIs (list not exhaustive)    These 'suspect medications' can trigger an autoimmune response that is similar in nature to systemic lupus erythematosus (SLE), although DIL tends to be less severe. Many times, symptoms may resolve or improve after the medication in question is discontinued.     Overall, the likelihood that a PPI would cause/exacerbate DIL is rare, however, not impossible (per above!). Of the PPIs, although rare to begin, omeprazole is most commonly associated with DIL. There is little literature referencing the role of dexlansoprazole in this relationship, due to lack of research/studies and also likely due to less-frequent prescribing of dexlansoprazole overall.     Stressed with patient that I cannot recommend for/against continuing pantoprazole vs switching to dexlansoprazole due to lack of information available.     Discussion- Renal Function  Also discussed patient's renal function- quite low at GFR of 31 as of 10/18/24. Per patient, has never been on any type of medications typically utilized for slowing progression of CKD, such as Jardiance or Farxiga.     At this time, recommending initiation of Jardiance- will discuss clinical eligibility at follow-up.       Laboratory Results  Lab Results   Component Value Date    BILITOT 0.5 10/10/2024    CALCIUM 9.1 10/18/2024    CO2 27 10/18/2024     10/18/2024    CREATININE 1.65 (H) 10/18/2024    GLUCOSE 102 (H) 10/18/2024    ALKPHOS 51 10/10/2024    K 4.1 10/18/2024    PROT 6.7 10/10/2024     10/18/2024    AST 18 10/10/2024    ALT 12 10/10/2024    BUN 28 (H) 10/18/2024    ANIONGAP 16 10/18/2024    MG 1.60 05/06/2024    PHOS CANCELED 05/10/2023    ALBUMIN 4.2 10/10/2024    GFRF 27 (A)  08/10/2023    GFRMALE CANCELED 05/10/2023    EGFR 31 (L) 10/18/2024     Lab Results   Component Value Date    TRIG 153 (H) 10/10/2024    CHOL 185 10/10/2024    HDL 53.4 10/10/2024     Lab Results   Component Value Date    HGBA1C 5.4 10/10/2024       Assessment/Plan   Problem List Items Addressed This Visit       GERD (gastroesophageal reflux disease)    Relevant Orders    Referral to Clinical Pharmacy     Other Visit Diagnoses       Systemic lupus erythematosus, unspecified SLE type, unspecified organ involvement status (Multi)        Relevant Orders    Referral to Clinical Pharmacy          Plan/Changes   Continue all meds under the continuation of care with the referring provider and clinical pharmacy team  Specialists: Patient currently sees outpatient rheumatology, gastroenterology, urology. NOT currently seeing nephrology.   Recommend ordering urine albumin/creatinine ratio to obtain baseline information on renal function.   Recommend starting Jardiance- will discuss at follow-up, need referral for CKD.     Next PCP Follow-Up  January 2024 with alternative PCP?    Next Clinical Pharmacy Follow-Up  2 weeks  CKD  Additional research into dexlansoprazole vs pantoprazole for likelihood of facilitating DIL flare      Shanda Martinez, PharmD     Verbal consent to manage patient's drug therapy was obtained from the patient. They were informed they may decline to participate or withdraw from participation in pharmacy services at any time.

## 2024-11-15 NOTE — Clinical Note
Very interesting referral regarding Lupus/PPI meds. Unfortunately, not an extensive amount of literature available, however will continue to look into.   Patient urgently needs to start an SGLT2i for her renal function, can I add CKD to the referral for her follow-up visit? Would like to obtain urine albumin/creatinine ratio and start Jardiance 10 mg daily, as no GFR restrictions for dosing, in hopes that Jardiance will helps slow down progression of CKD.   Can CKD also be added to patient's problem list, pending updated lab results ~1 week? Thank you!

## 2024-11-18 DIAGNOSIS — N18.9 CHRONIC KIDNEY DISEASE, UNSPECIFIED CKD STAGE: Primary | ICD-10-CM

## 2024-11-18 NOTE — ASSESSMENT & PLAN NOTE
Routine blood work orders placed for November, 2024 for assessment purposes  -Most recent colonoscopy completed in may, 2023 with recommendations that no additional screening colonoscopies are indicated given age and lack of symptoms  -DEXA completed in 2023 and was positive for osteoporosis.  Patient's previous provider recommended increased calcium and vitamin D supplements in addition to weightbearing activities.  Will rescreen patient in 2025

## 2024-11-18 NOTE — ASSESSMENT & PLAN NOTE
Pharmacy consult placed for additional guidance regarding new medication/interaction with lupus.  Patient continues on previous PPI at this time.

## 2024-11-18 NOTE — ASSESSMENT & PLAN NOTE
Patient to continue current medication regiment.  She is also to continue to monitor her blood pressures routinely at home.  She is to notify provider for any persistent elevations in systolic blood pressure or symptomatic hypotension.  Patient to continue with low-sodium, heart healthy diet.  Provider to be notified for any new cardiac concerns.

## 2024-11-21 ENCOUNTER — LAB (OUTPATIENT)
Dept: LAB | Facility: LAB | Age: 79
End: 2024-11-21
Payer: MEDICARE

## 2024-11-21 DIAGNOSIS — N18.9 CHRONIC KIDNEY DISEASE, UNSPECIFIED CKD STAGE: ICD-10-CM

## 2024-11-21 LAB
ANION GAP SERPL CALC-SCNC: 17 MMOL/L (ref 10–20)
BUN SERPL-MCNC: 28 MG/DL (ref 6–23)
CALCIUM SERPL-MCNC: 10.1 MG/DL (ref 8.6–10.3)
CHLORIDE SERPL-SCNC: 103 MMOL/L (ref 98–107)
CO2 SERPL-SCNC: 29 MMOL/L (ref 21–32)
CREAT SERPL-MCNC: 1.72 MG/DL (ref 0.5–1.05)
CREAT UR-MCNC: 98.8 MG/DL (ref 20–320)
EGFRCR SERPLBLD CKD-EPI 2021: 30 ML/MIN/1.73M*2
GLUCOSE SERPL-MCNC: 82 MG/DL (ref 74–99)
MICROALBUMIN UR-MCNC: <7 MG/L
MICROALBUMIN/CREAT UR: NORMAL MG/G{CREAT}
POTASSIUM SERPL-SCNC: 4.4 MMOL/L (ref 3.5–5.3)
SODIUM SERPL-SCNC: 145 MMOL/L (ref 136–145)

## 2024-11-21 PROCEDURE — 80048 BASIC METABOLIC PNL TOTAL CA: CPT

## 2024-11-21 PROCEDURE — 82043 UR ALBUMIN QUANTITATIVE: CPT

## 2024-11-21 PROCEDURE — 82570 ASSAY OF URINE CREATININE: CPT

## 2024-11-21 PROCEDURE — 36415 COLL VENOUS BLD VENIPUNCTURE: CPT

## 2024-11-22 DIAGNOSIS — M43.10 ACQUIRED SPONDYLOLISTHESIS: Primary | ICD-10-CM

## 2024-11-22 RX ORDER — GABAPENTIN 100 MG/1
100 CAPSULE ORAL NIGHTLY
Qty: 30 CAPSULE | Refills: 5 | Status: SHIPPED | OUTPATIENT
Start: 2024-11-22 | End: 2025-05-21

## 2024-11-22 NOTE — PROGRESS NOTES
Discussed with patient who is agreeable to decrease gabapentin due to slightly worsening kidney function.

## 2024-12-02 ENCOUNTER — PREP FOR PROCEDURE (OUTPATIENT)
Dept: ORTHOPEDIC SURGERY | Facility: HOSPITAL | Age: 79
End: 2024-12-02
Payer: MEDICARE

## 2024-12-02 DIAGNOSIS — M17.11 PRIMARY OSTEOARTHRITIS OF RIGHT KNEE: Primary | ICD-10-CM

## 2024-12-03 ENCOUNTER — APPOINTMENT (OUTPATIENT)
Dept: PHARMACY | Facility: HOSPITAL | Age: 79
End: 2024-12-03
Payer: MEDICARE

## 2024-12-03 DIAGNOSIS — K21.9 GASTROESOPHAGEAL REFLUX DISEASE, UNSPECIFIED WHETHER ESOPHAGITIS PRESENT: ICD-10-CM

## 2024-12-03 DIAGNOSIS — M32.9 SYSTEMIC LUPUS ERYTHEMATOSUS, UNSPECIFIED SLE TYPE, UNSPECIFIED ORGAN INVOLVEMENT STATUS (MULTI): ICD-10-CM

## 2024-12-03 NOTE — Clinical Note
Further follow-up with patient today for her Drug Induced Lupus diagnosis and medication question. I was not able to find any further literature on the likelihood of dexlansoprazole of inducing/exacebating Drug Induced Lupus.   Patient's renal function is very low at a GFR of 30 as of 11/21/24. Highly recommend she follow with nephrology ASAP to determine if this is true CKD vs Lupus Nephritis? I don't know much about Lupus Nephritis (and from my brief research, I dont' think she is presenting as such), but would recommend nephrology determine true diagnosis and consider initiating SGLT2i if clinically beneficial.   No follow-up planned at this time.

## 2024-12-03 NOTE — PROGRESS NOTES
Outpatient Clinical Pharmacy Visit  Vicky Govea is a 79 y.o. female who was referred to the ambulatory Clinical Pharmacy Team for their Medication Question (Drug Induced Lupus).    Referring Provider: JOHN Anderson    Discussion  Patient presents today for Clinical Pharmacy Follow-up. At last visit, discussed patient's diagnosis of Drug-Induced Lupus (DIL)at length. This pharmacist was unable to find any further literature on Dexlansoprazole and the likelihood of causing/inducing/exacerbating further episodes of DIL. Patient notes that she will contact gastroenterology for further follow-up.     Reviewed recent urine albumin/creatinine ratio collected 11/21/24 effectively yielded a 'normal' result, as albumin levels were too low to calculate an albumin/creatinine ratio. However, serum creatinine resulted as 1.72 as of 11/21/24.    GFR is still notably low at 30 as of 11/21/24. Unsure if decreased GFR due directly to CKD or complication of DIL-recommend follow-up with nephrology.     Patient curious as to whether topical voltaren or intra-articular cortisone could have caused further kidney decline. Discussed with patient that both of these medications are unlikely to have caused.     Laboratory Results  Lab Results   Component Value Date    BILITOT 0.5 10/10/2024    CALCIUM 10.1 11/21/2024    CO2 29 11/21/2024     11/21/2024    CREATININE 1.72 (H) 11/21/2024    GLUCOSE 82 11/21/2024    ALKPHOS 51 10/10/2024    K 4.4 11/21/2024    PROT 6.7 10/10/2024     11/21/2024    AST 18 10/10/2024    ALT 12 10/10/2024    BUN 28 (H) 11/21/2024    ANIONGAP 17 11/21/2024    MG 1.60 05/06/2024    PHOS CANCELED 05/10/2023    ALBUMIN 4.2 10/10/2024    GFRF 27 (A) 08/10/2023    GFRMALE CANCELED 05/10/2023    EGFR 30 (L) 11/21/2024     Lab Results   Component Value Date    TRIG 153 (H) 10/10/2024    CHOL 185 10/10/2024    HDL 53.4 10/10/2024     Lab Results   Component Value Date    HGBA1C 5.4 10/10/2024        Assessment/Plan   Problem List Items Addressed This Visit       GERD (gastroesophageal reflux disease)     Other Visit Diagnoses       Systemic lupus erythematosus, unspecified SLE type, unspecified organ involvement status (Multi)              Plan/Changes   Continue all meds under the continuation of care with the referring provider and clinical pharmacy team  Recommend referral to nephrology before considering utility of SGLT2i medications to rule out Lupus Nephritis.     Next PCP Follow-Up  12/12/24    Next Clinical Pharmacy Follow-Up  As needed, patient to call with any questions/concerns.       Shanda Martinez PharmD     Verbal consent to manage patient's drug therapy was obtained from the patient. They were informed they may decline to participate or withdraw from participation in pharmacy services at any time.

## 2024-12-05 DIAGNOSIS — N18.9 CHRONIC KIDNEY DISEASE, UNSPECIFIED CKD STAGE: Primary | ICD-10-CM

## 2024-12-10 DIAGNOSIS — M43.10 ACQUIRED SPONDYLOLISTHESIS: ICD-10-CM

## 2024-12-10 DIAGNOSIS — I10 BENIGN ESSENTIAL HYPERTENSION: ICD-10-CM

## 2024-12-10 RX ORDER — GABAPENTIN 100 MG/1
100 CAPSULE ORAL NIGHTLY
Qty: 90 CAPSULE | Refills: 0 | Status: SHIPPED | OUTPATIENT
Start: 2024-12-10

## 2024-12-11 PROBLEM — L85.3 XEROSIS CUTIS: Status: RESOLVED | Noted: 2021-09-02 | Resolved: 2024-12-11

## 2024-12-11 RX ORDER — LISINOPRIL 20 MG/1
20 TABLET ORAL DAILY
Qty: 90 TABLET | Refills: 1 | Status: SHIPPED | OUTPATIENT
Start: 2024-12-11 | End: 2024-12-12 | Stop reason: SDUPTHER

## 2024-12-12 ENCOUNTER — APPOINTMENT (OUTPATIENT)
Dept: PRIMARY CARE | Facility: CLINIC | Age: 79
End: 2024-12-12
Payer: MEDICARE

## 2024-12-12 VITALS
BODY MASS INDEX: 24.9 KG/M2 | DIASTOLIC BLOOD PRESSURE: 72 MMHG | HEIGHT: 60 IN | WEIGHT: 126.8 LBS | OXYGEN SATURATION: 96 % | SYSTOLIC BLOOD PRESSURE: 126 MMHG | HEART RATE: 72 BPM

## 2024-12-12 DIAGNOSIS — N18.30 STAGE 3 CHRONIC KIDNEY DISEASE, UNSPECIFIED WHETHER STAGE 3A OR 3B CKD (MULTI): ICD-10-CM

## 2024-12-12 DIAGNOSIS — I10 BENIGN ESSENTIAL HYPERTENSION: Primary | ICD-10-CM

## 2024-12-12 DIAGNOSIS — E78.2 MIXED HYPERLIPIDEMIA: ICD-10-CM

## 2024-12-12 DIAGNOSIS — K21.9 GASTROESOPHAGEAL REFLUX DISEASE, UNSPECIFIED WHETHER ESOPHAGITIS PRESENT: ICD-10-CM

## 2024-12-12 DIAGNOSIS — Z00.00 ROUTINE ADULT HEALTH MAINTENANCE: ICD-10-CM

## 2024-12-12 DIAGNOSIS — K58.0 IRRITABLE BOWEL SYNDROME WITH DIARRHEA: ICD-10-CM

## 2024-12-12 DIAGNOSIS — M15.0 PRIMARY OSTEOARTHRITIS INVOLVING MULTIPLE JOINTS: ICD-10-CM

## 2024-12-12 PROBLEM — M25.551 CHRONIC PAIN OF BOTH HIPS: Status: RESOLVED | Noted: 2023-09-12 | Resolved: 2024-12-12

## 2024-12-12 PROBLEM — R22.31 SKIN LUMP OF ARM, RIGHT: Status: RESOLVED | Noted: 2024-10-01 | Resolved: 2024-12-12

## 2024-12-12 PROBLEM — Z13.5 SCREENING FOR EYE CONDITION: Status: RESOLVED | Noted: 2024-09-17 | Resolved: 2024-12-12

## 2024-12-12 PROBLEM — M70.60 GREATER TROCHANTERIC BURSITIS: Status: RESOLVED | Noted: 2023-09-12 | Resolved: 2024-12-12

## 2024-12-12 PROBLEM — H90.3 SENSORINEURAL HEARING LOSS, BILATERAL: Status: RESOLVED | Noted: 2021-08-26 | Resolved: 2024-12-12

## 2024-12-12 PROBLEM — R11.2 NAUSEA AND VOMITING: Status: RESOLVED | Noted: 2024-11-07 | Resolved: 2024-12-12

## 2024-12-12 PROBLEM — R53.83 FATIGUE: Status: RESOLVED | Noted: 2024-09-27 | Resolved: 2024-12-12

## 2024-12-12 PROBLEM — Z98.890 HISTORY OF COLONOSCOPY: Status: RESOLVED | Noted: 2024-09-27 | Resolved: 2024-12-12

## 2024-12-12 PROBLEM — M25.552 CHRONIC PAIN OF BOTH HIPS: Status: RESOLVED | Noted: 2023-09-12 | Resolved: 2024-12-12

## 2024-12-12 PROBLEM — S86.912A STRAIN OF LEFT KNEE: Status: RESOLVED | Noted: 2023-12-14 | Resolved: 2024-12-12

## 2024-12-12 PROBLEM — G89.29 CHRONIC PAIN OF BOTH HIPS: Status: RESOLVED | Noted: 2023-09-12 | Resolved: 2024-12-12

## 2024-12-12 PROBLEM — E66.3 OVERWEIGHT WITH BODY MASS INDEX (BMI) 25.0-29.9: Status: RESOLVED | Noted: 2024-09-27 | Resolved: 2024-12-12

## 2024-12-12 PROCEDURE — G2211 COMPLEX E/M VISIT ADD ON: HCPCS | Performed by: NURSE PRACTITIONER

## 2024-12-12 PROCEDURE — 1159F MED LIST DOCD IN RCRD: CPT | Performed by: NURSE PRACTITIONER

## 2024-12-12 PROCEDURE — 99214 OFFICE O/P EST MOD 30 MIN: CPT | Performed by: NURSE PRACTITIONER

## 2024-12-12 PROCEDURE — 1036F TOBACCO NON-USER: CPT | Performed by: NURSE PRACTITIONER

## 2024-12-12 PROCEDURE — 3078F DIAST BP <80 MM HG: CPT | Performed by: NURSE PRACTITIONER

## 2024-12-12 PROCEDURE — 1157F ADVNC CARE PLAN IN RCRD: CPT | Performed by: NURSE PRACTITIONER

## 2024-12-12 PROCEDURE — 1160F RVW MEDS BY RX/DR IN RCRD: CPT | Performed by: NURSE PRACTITIONER

## 2024-12-12 PROCEDURE — 3074F SYST BP LT 130 MM HG: CPT | Performed by: NURSE PRACTITIONER

## 2024-12-12 RX ORDER — FAMOTIDINE 40 MG/1
40 TABLET, FILM COATED ORAL NIGHTLY PRN
Qty: 90 TABLET | Refills: 2 | Status: SHIPPED | OUTPATIENT
Start: 2024-12-12 | End: 2025-12-12

## 2024-12-12 RX ORDER — LISINOPRIL 30 MG/1
30 TABLET ORAL DAILY
Qty: 90 TABLET | Refills: 0 | Status: SHIPPED | OUTPATIENT
Start: 2024-12-12 | End: 2025-03-12

## 2024-12-12 ASSESSMENT — ENCOUNTER SYMPTOMS
ARTHRALGIAS: 1
FEVER: 0
JOINT SWELLING: 0
FATIGUE: 0
CHILLS: 0
DIFFICULTY URINATING: 0
EYE PAIN: 0
WHEEZING: 0
TROUBLE SWALLOWING: 0
WOUND: 0
BRUISES/BLEEDS EASILY: 0
WEAKNESS: 0
NAUSEA: 0
ADENOPATHY: 0
SHORTNESS OF BREATH: 0
DIARRHEA: 0
ABDOMINAL PAIN: 0
COUGH: 0
COLOR CHANGE: 0
HEADACHES: 0
PALPITATIONS: 0
CONSTIPATION: 0
MYALGIAS: 0
BACK PAIN: 1
DIZZINESS: 0
ABDOMINAL DISTENTION: 0
SEIZURES: 0

## 2024-12-12 NOTE — ASSESSMENT & PLAN NOTE
Will increase patient's lisinopril to 30 mg due to fluctuating blood pressures and occasional associated symptoms.   She is also to continue to monitor her blood pressures routinely at home.  She is to notify provider for any persistent elevations in systolic blood pressure or symptomatic hypotension.  Patient to continue with low-sodium, heart healthy diet.  Provider to be notified for any new cardiac concerns.

## 2024-12-12 NOTE — PATIENT INSTRUCTIONS
Please increase your lisinopril to 30mg daily. Monitor your blood pressure at least once daily. Keep a log for provider review. Please call the office before than if the systolic blood pressure (top number) is consistently greater than 140 or the diastolic blood pressure (bottom number) is consistently greater than 90.    Call 911 or emergency medical services if your blood pressure is 180/120 mm Hg or greater and you have chest pain, shortness of breath, or symptoms of stroke. Stroke symptoms include numbness or tingling, trouble speaking, or changes in vision    Please arrange for follow-up in 6 months

## 2024-12-12 NOTE — ASSESSMENT & PLAN NOTE
Most recent blood work reviewed.  Will continue to monitor as appropriate.  -Most recent colonoscopy completed in may, 2023 with recommendations that no additional screening colonoscopies are indicated given age and lack of symptoms  -DEXA completed in 2023 and was positive for osteoporosis.  Patient's previous provider recommended increased calcium and vitamin D supplements in addition to weightbearing activities.  Will rescreen patient in 2025

## 2024-12-12 NOTE — PROGRESS NOTES
"Subjective   Patient ID: Vicky Govea is a 79 y.o. female who presents for Follow-up (B/P ).    Patient seen today for medication management and routine follow-up.  Patient monitors her blood pressures routinely at home and brought a log today for provider review.  Readings tend to fluctuate from day-to-day but her systolic blood pressures typically in the 120s -150s range.  Patient denies any associated symptoms such as lightheaded nests, dizziness, chest pain/tightness, headaches, blurred vision or other cardiac concerns.  She does however state that she can occasionally \"feel \" when her blood pressure is high and that is when she is very physically active.  She states that this feeling subsides when she stops and rests.  Patient rs retired and likes to stay active through playing pickle ball.  No shortness of breath or chest pain with exertion.    No reported issues with appetite, staying hydrated.  Patient does report IBS-D that only tends to flare if she eats the \"wrong foods \".  She does follow routinely with gastroenterology for IBS and GERD.  Currently, she feels like her bowels are under control. Occasional nocturia reported but otherwise patient denies any urinary complaints.  No significant concerns with mood or insomnia.  Patient lives with her spouse and appears to have a good family support system.  Patient follows routinely for multiple specialists, this includes urology, rheumatology, orthopedic surgery, ophthalmology and gynecology.  Patient has multiple joint/chronic pain issues that are being relatively well managed with specialists.  She is scheduled to undergo a right total knee replacement next month.  Medications reviewed.  No other acute concerns voiced at this time.           Current Outpatient Medications on File Prior to Visit   Medication Sig Dispense Refill    amLODIPine (Norvasc) 10 mg tablet Take 1 tablet (10 mg) by mouth once daily. 90 tablet 1    atorvastatin (Lipitor) 10 mg " tablet TAKE 1 TABLET DAILY AT BEDTIME 90 tablet 3    calcium carbonate-vitamin D3 500 mg-5 mcg (200 unit) tablet Take 1 tablet by mouth once daily.      cholecalciferol (Vitamin D-3) 50 mcg (2,000 unit) capsule Take 1 capsule (50 mcg) by mouth early in the morning..      clopidogrel (Plavix) 75 mg tablet Take 1 tablet (75 mg) by mouth once daily. 90 tablet 3    cranberry fruit 400 mg tablet Take 1 tablet by mouth once daily.      gabapentin (Neurontin) 100 mg capsule Take 1 capsule (100 mg) by mouth once daily at bedtime. 90 capsule 0    hydroxychloroquine (Plaquenil) 200 mg tablet Take 1 tablet (200 mg) by mouth once daily. 90 tablet 0    hyoscyamine (Anaspaz, Levsin) 0.125 mg tablet Take 1 tablet (0.125 mg) by mouth every 6 hours if needed for cramping or diarrhea. 360 tablet 0    magnesium oxide-Mg AA chelate 300 mg capsule Take 1 capsule (300 mg) by mouth once daily.      vit A/vit C/vit E/zinc/copper (PRESERVISION AREDS ORAL) Take by mouth.      [DISCONTINUED] dexlansoprazole (Dexilant) 60 mg DR capsule Take 1 capsule (60 mg) by mouth once daily. Do not crush or chew. 30 capsule 0    [DISCONTINUED] famotidine (Pepcid) 40 mg tablet Take 1 tablet (40 mg) by mouth as needed at bedtime for heartburn. 30 tablet 11    [DISCONTINUED] gabapentin (Neurontin) 100 mg capsule Take 1 capsule (100 mg) by mouth once daily at bedtime. 30 capsule 5    [DISCONTINUED] lisinopril 20 mg tablet Take 1 tablet (20 mg) by mouth 2 times a day. 180 tablet 3    [DISCONTINUED] lisinopril 20 mg tablet Take 1 tablet (20 mg) by mouth once daily. 90 tablet 1     No current facility-administered medications on file prior to visit.       Past Medical History:   Diagnosis Date    Abnormal urine findings 05/22/2023    Acute back pain 05/22/2023    Acute bronchitis with bronchospasm 08/26/2023    Acute kidney injury (CMS-HCC) 05/22/2023    Repeat cbc,cmp    Acute non-recurrent frontal sinusitis 08/16/2023    - augmentin twice a day 7 days  -  Nasal  saline, increase fluids  -  hand hygiene and infection prevention discussed  -  Warm compress to sinuses  - humidification  - recommend to eat yogurt twice daily while taking antibiotic or a daily probiotic       Acute pharyngitis due to other specified organisms 05/03/2019    Acute bacterial pharyngitis    Acute upper respiratory infection, unspecified 02/06/2018    Acute URI    Age-related nuclear cataract of left eye 05/22/2023    Age-related nuclear cataract of right eye 05/22/2023    Allergic     Allergic contact dermatitis due to plants, except food 06/11/2018    Contact dermatitis due to poison oak    Allergic reaction to contrast dye 09/12/2023    Angina pectoris 08/16/2023    Arthritis     Arthritis of right knee 05/22/2023    Back pain 09/12/2023    Benign paroxysmal positional vertigo 07/08/2021    Body mass index (BMI) 26.0-26.9, adult 06/17/2021    Body mass index (BMI) of 26.0 to 26.9 in adult    Calculus of kidney 12/28/2015    Recurrent kidney stones    Carpal tunnel syndrome of left wrist 05/22/2023    Carpal tunnel syndrome of right wrist 05/22/2023    Carpal tunnel syndrome, left upper limb 08/02/2021    Carpal tunnel syndrome of left wrist    Carpal tunnel syndrome, right upper limb 08/02/2021    Carpal tunnel syndrome of right wrist    Cataract, nuclear sclerotic, both eyes 05/22/2023    Cheilitis 09/02/2021    Chronic epigastric pain 09/12/2023    Chronic kidney disease     Chronic low back pain 05/22/2023    Colitis 09/12/2023    Contusion of left front wall of thorax, initial encounter 12/15/2019    Contusion of left chest wall, initial encounter    Contusion of right front wall of thorax, initial encounter 12/15/2019    Contusion of right chest wall, initial encounter    Contusion of right hand 05/22/2023    Contusion of right hand, initial encounter 10/13/2021    Contusion of right hand, initial encounter    Difficulty in walking, not elsewhere classified 03/19/2020    Difficulty walking     Difficulty walking 05/22/2023    Diverticulitis of intestine, part unspecified, without perforation or abscess without bleeding 11/15/2018    Acute diverticulitis    Diverticulosis of colon 05/22/2023    Diverticulosis of large intestine without perforation or abscess without bleeding 10/22/2021    Diverticulosis of colon    Dorsalgia, unspecified 09/01/2022    Acute back pain    Dorsalgia, unspecified 11/15/2018    Acute back pain    Drusen (degenerative) of macula, bilateral 05/22/2023    Dry eye syndrome of left lacrimal gland 06/08/2020    Dry eye syndrome of left lacrimal gland    Encounter for follow-up examination after completed treatment for conditions other than malignant neoplasm 03/04/2016    Hospital discharge follow-up    Epigastric pain 03/03/2016    Dyspepsia    Esophageal reflux 05/22/2023    Estrogen deficiency 05/22/2023    Eyelid abnormality 05/22/2023    Gait difficulty 05/22/2023    Gastritis 09/12/2023    Gastro-esophageal reflux disease with esophagitis, without bleeding 10/30/2020    Reflux esophagitis    Generalized abdominal pain 11/19/2018    Acute generalized abdominal pain    Greater trochanteric bursitis of left hip 05/22/2023    Hemangioma of skin and subcutaneous tissue 09/02/2021    Hiatal hernia 05/22/2023    Hip arthritis 09/17/2013    History of total left knee replacement 05/22/2023    Hypermetropia, bilateral 07/08/2022    Hypermetropia of both eyes    Hyperopia of both eyes 05/22/2023    Hypertension     Hypomagnesemia 06/17/2021    Hypomagnesemia    Inflamed seborrheic keratosis 09/02/2021    Injury of finger of right hand 05/22/2023    Left upper quadrant pain 03/03/2016    LUQ pain    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    Lentigines 09/02/2021    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    Lumbar radicular pain 07/10/2013    Mass of joint of left wrist 05/22/2023    Melanocytic nevi of trunk 09/02/2021    Melanocytic nevi of unspecified lower limb, including  hip 09/02/2021    Melanocytic nevi of unspecified upper limb, including shoulder 09/02/2021    Menopausal symptom 09/12/2023    Mild vitamin D deficiency 05/22/2023    Muscle spasm of back 05/01/2017    Back muscle spasm    Myalgia due to statin 05/22/2023    Neoplasm of uncertain behavior of skin 09/02/2021    Neurogenic 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 05/22/2023    Ocular rosacea 05/22/2023    Ocular rosacea 05/22/2023    Osteoarthritis, hand 05/22/2023    Osteoarthritis, knee 02/13/2015    Osteopenia 09/12/2023    Other long term (current) drug therapy 07/11/2022    Encounter for long-term current use of high risk medication    Other nail disorders 10/09/2018    Leukonychia striata    Other reduced mobility 03/19/2020    Impaired mobility and ADLs    Other specified abnormal findings of blood chemistry 10/09/2018    Elevated serum creatinine    Other specified abnormal findings of blood chemistry 11/28/2016    Abnormal thyroid blood test    Other specified joint disorders, left wrist 02/08/2021    Mass of joint of left wrist    Other specified postprocedural states 09/19/2017    History of colonoscopy    Other symptoms and signs involving the musculoskeletal system 07/16/2021    Wrist weakness    Other symptoms and signs involving the musculoskeletal system 10/28/2019    Weakness of both lower extremities    Overweight 10/26/2021    Overweight with body mass index (BMI) of 26 to 26.9 in adult    Pain due to internal orthopedic prosthetic devices, implants and grafts, initial encounter (CMS-HCC) 01/14/2020    Pain due to total knee replacement, initial encounter    Pain in both hands 05/22/2023    Pain in both wrists 05/22/2023    Pain in left hip 06/25/2021    Pain of left hip joint    Pain in left knee 04/16/2020    Left knee pain    Pain in right hand 07/21/2021    Pain in both hands    Pain in right knee 10/13/2021    Right knee pain, unspecified chronicity    Pain in right shoulder 11/22/2019     Right shoulder pain    Pain in right wrist 07/21/2021    Pain in both wrists    Personal history of diseases of the skin and subcutaneous tissue 06/26/2017    History of urticaria    Personal history of diseases of the skin and subcutaneous tissue 09/06/2018    History of contact dermatitis    Personal history of other diseases of the musculoskeletal system and connective tissue 02/06/2020    History of muscle pain    Personal history of other diseases of the nervous system and sense organs 01/05/2017    History of sleep disturbance    Personal history of other diseases of the respiratory system 05/11/2018    History of acute sinusitis    Personal history of other endocrine, nutritional and metabolic disease 04/27/2020    History of estrogen deficiency    Personal history of other specified conditions 03/03/2016    History of epigastric pain    Personal history of other specified conditions 10/25/2022    History of weakness    Personal history of other specified conditions 12/04/2021    History of vertigo    Personal history of other specified conditions 07/05/2022    History of epigastric pain    Personal history of other specified conditions 10/28/2019    History of gait disorder    Personal history of other specified conditions 01/09/2017    History of fatigue    Personal history of transient ischemic attack (TIA), and cerebral infarction without residual deficits 12/28/2015    History of stroke    Pleurodynia 12/15/2019    Rib pain on left side    Presence of unspecified artificial hip joint 05/15/2020    Status post revision of total hip replacement    Rash of face 05/22/2023    Renal mass, right 05/22/2023    Rib pain on left side 05/22/2023    Rib pain on right side 05/22/2023    Xray chest    Right knee pain 05/22/2023    Right shoulder pain 05/22/2023    Right upper quadrant pain 12/18/2017    Abdominal pain, RUQ    Rosacea 05/22/2023    Scar adherent 05/22/2023    Scar condition and fibrosis of skin  09/02/2021    Scar conditions and fibrosis of skin 07/02/2021    Scar adherent    Seborrheic dermatitis, unspecified 09/02/2021    Spinal stenosis of lumbar region 07/10/2013    Stroke (Multi)     Trigger finger, acquired 05/22/2023    Trochanteric bursitis, left hip 05/15/2020    Greater trochanteric bursitis of left hip    Ulcerative blepharitis 05/22/2023    Vitamin D deficiency, unspecified 06/09/2016    Hypovitaminosis D    Weakness of both lower extremities 05/22/2023    Wrist stiffness 05/22/2023    Wrist weakness 05/22/2023        Past Surgical History:   Procedure Laterality Date    EYE SURGERY      HIP SURGERY  12/28/2015    Hip Surgery    HYSTERECTOMY  12/28/2015    Hysterectomy    JOINT REPLACEMENT      MR HEAD ANGIO WO IV CONTRAST  12/29/2012    MR HEAD ANGIO WO IV CONTRAST [facility]    MR NECK ANGIO WO IV CONTRAST  12/29/2012    MR NECK ANGIO WO IV CONTRAST [facility]    TOTAL HIP ARTHROPLASTY  12/28/2015    Hip Replacement        Family History   Problem Relation Name Age of Onset    Diabetes Mother Shawanda Ramirez     Arthritis Mother Shawanda Ramirez     Stroke Mother Shawanda Ramirez     Other (pacemaker) Father      Breast cancer Other grandmother     Breast cancer Sibling          Review of Systems   Constitutional:  Negative for chills, fatigue and fever.   HENT:  Negative for dental problem and trouble swallowing.    Eyes:  Negative for pain and visual disturbance.        Wears glasses; Positie for cataracts   Respiratory:  Negative for cough, shortness of breath and wheezing.    Cardiovascular:  Negative for chest pain, palpitations and leg swelling.   Gastrointestinal:  Negative for abdominal distention, abdominal pain, constipation, diarrhea and nausea.        Positive for IBS-D   Endocrine: Negative for cold intolerance and heat intolerance.   Genitourinary:  Negative for difficulty urinating.        Positive for nocturia    Musculoskeletal:  Positive for arthralgias and back pain.  Negative for gait problem, joint swelling and myalgias.        Positive for polyarthalgia   Skin:  Negative for color change, pallor, rash and wound.   Allergic/Immunologic: Negative for environmental allergies and food allergies.   Neurological:  Negative for dizziness, seizures, weakness and headaches.   Hematological:  Negative for adenopathy. Does not bruise/bleed easily.   Psychiatric/Behavioral:  Negative for behavioral problems.    All other systems reviewed and are negative.      Objective   /72   Pulse 72   Ht 1.524 m (5')   Wt 57.5 kg (126 lb 12.8 oz)   LMP  (LMP Unknown)   SpO2 96%   BMI 24.76 kg/m²     Physical Exam  Constitutional:       General: She is not in acute distress.     Appearance: Normal appearance. She is not toxic-appearing.   HENT:      Head: Normocephalic and atraumatic.      Right Ear: External ear normal.      Left Ear: External ear normal.      Nose: Nose normal.      Mouth/Throat:      Mouth: Mucous membranes are moist.      Pharynx: Oropharynx is clear.   Eyes:      Extraocular Movements: Extraocular movements intact.      Conjunctiva/sclera: Conjunctivae normal.   Cardiovascular:      Rate and Rhythm: Normal rate and regular rhythm.      Pulses: Normal pulses.      Heart sounds: Normal heart sounds. No murmur heard.  Pulmonary:      Effort: Pulmonary effort is normal.      Breath sounds: Normal breath sounds. No wheezing.   Abdominal:      General: Bowel sounds are normal.      Palpations: Abdomen is soft.   Musculoskeletal:         General: No swelling.      Cervical back: Normal range of motion and neck supple.   Skin:     General: Skin is warm and dry.   Neurological:      General: No focal deficit present.      Mental Status: She is alert and oriented to person, place, and time. Mental status is at baseline.      Cranial Nerves: No cranial nerve deficit.      Motor: No weakness.   Psychiatric:         Mood and Affect: Mood normal.         Behavior: Behavior normal.          Thought Content: Thought content normal.         Judgment: Judgment normal.         Assessment/Plan   Problem List Items Addressed This Visit             ICD-10-CM    Benign essential hypertension - Primary I10     Will increase patient's lisinopril to 30 mg due to fluctuating blood pressures and occasional associated symptoms.   She is also to continue to monitor her blood pressures routinely at home.  She is to notify provider for any persistent elevations in systolic blood pressure or symptomatic hypotension.  Patient to continue with low-sodium, heart healthy diet.  Provider to be notified for any new cardiac concerns.         Relevant Medications    lisinopril 30 mg tablet    Hyperlipidemia E78.5     Patient continues on daily statin without issue.  Will continue to monitor fasting lipid panel routinely         Irritable bowel syndrome with diarrhea K58.0     Symptoms appear to be relatively well-controlled with restricted diet.  Provider to be notified for any persistent worsening GI concerns         Routine adult health maintenance Z00.00     Most recent blood work reviewed.  Will continue to monitor as appropriate.  -Most recent colonoscopy completed in may, 2023 with recommendations that no additional screening colonoscopies are indicated given age and lack of symptoms  -DEXA completed in 2023 and was positive for osteoporosis.  Patient's previous provider recommended increased calcium and vitamin D supplements in addition to weightbearing activities.  Will rescreen patient in 2025         Primary osteoarthritis involving multiple joints M15.0     Patient is scheduled to undergo a right total knee replacement next month.  She is to continue routine follow-up with rheumatology for continued pain/joint concerns.  PT/OT as appropriate         Stage 3 chronic kidney disease (Multi) N18.30     Orders in place to recheck renal panel later this month.  Patient encouraged to stay hydrated and arrange for  follow-up with nephrology for continued evaluation and treatment recommendations.

## 2024-12-12 NOTE — ASSESSMENT & PLAN NOTE
Patient is scheduled to undergo a right total knee replacement next month.  She is to continue routine follow-up with rheumatology for continued pain/joint concerns.  PT/OT as appropriate

## 2024-12-12 NOTE — ASSESSMENT & PLAN NOTE
Patient continues on daily statin without issue.  Will continue to monitor fasting lipid panel routinely

## 2024-12-12 NOTE — ASSESSMENT & PLAN NOTE
Orders in place to recheck renal panel later this month.  Patient encouraged to stay hydrated and arrange for follow-up with nephrology for continued evaluation and treatment recommendations.

## 2024-12-12 NOTE — ASSESSMENT & PLAN NOTE
Most recent DEXA completed in December, 2023 and showed osteoporosis.  Per notes, patient declined oral bisphosphonate and would like to continue on with calcium and vitamin D supplements.

## 2024-12-19 ENCOUNTER — APPOINTMENT (OUTPATIENT)
Dept: ORTHOPEDIC SURGERY | Facility: CLINIC | Age: 79
End: 2024-12-19
Payer: MEDICARE

## 2024-12-19 NOTE — PREADMISSION SCREENING NOTE
Pre Surgery Discharge Planning :    Procedure:  R TKA    Date of procedure:  1/13/2025    Address you will be discharged to:  Address   [id number] [address] [facility] [address]             Care Partner:  DEIRDRE    Current mobility status:  ID    Stairs into house: 3    Stairs inside house: 13    DME:   WALKER, RAISED TOILET SEAT, ICE MACHINE    Joint Class:  12/19/2024    Same Day Surgery:  NO

## 2024-12-19 NOTE — GROUP NOTE
In addition to the group class activities, Vicky Govea had the following lab work completed:  No orders of the defined types were placed in this encounter.      A new History and Physical was not completed.    This class lasted approximately 1 hour and had 16 participants. The nurse instructor covered the following topics:  Overview of joint replacement surgery.  Instructions for at-home preparation for surgery (included below).  Expectations before and after surgery including hospital stay.  Discharge planning and home care options.  Physical therapy overview and review of at-home exercises.    Information for Surgery or Hospital Stay  For morning surgery, do not eat or drink after midnight. This includes water, mints, and chewing gum.  For afternoon surgery, you may have a clear liquid breakfast before 6 A.M. Clear liquids are anything you can see through, like apple juice, cranberry juice, tea or black coffee without cream. Do not eat or drink after 6 A.M. This includes water.  Wear loose fitting clothes--something that can be slipped on and off easily over a dressing.  Bring a walker or crutches with you to the hospital on the day of surgery.  Please inform the office if you have any symptoms of illness or fever prior to surgery.  You need to have transportation home when discharged, as you will be medicated.  STOP any aspirin or anti-inflammatory products (Aleve, Advil, etc.) 5-7 days before surgery.  You may use Tylenol or Extra Strength Tylenol.  STOP any vitamins or herbal products 10 days before surgery.

## 2024-12-23 ENCOUNTER — PRE-ADMISSION TESTING (OUTPATIENT)
Dept: PREADMISSION TESTING | Facility: HOSPITAL | Age: 79
End: 2024-12-23
Payer: MEDICARE

## 2024-12-23 VITALS
DIASTOLIC BLOOD PRESSURE: 61 MMHG | SYSTOLIC BLOOD PRESSURE: 108 MMHG | HEIGHT: 60 IN | HEART RATE: 77 BPM | WEIGHT: 123.46 LBS | OXYGEN SATURATION: 98 % | RESPIRATION RATE: 18 BRPM | TEMPERATURE: 97 F | BODY MASS INDEX: 24.24 KG/M2

## 2024-12-23 DIAGNOSIS — D64.9 ANEMIA, UNSPECIFIED TYPE: ICD-10-CM

## 2024-12-23 DIAGNOSIS — M17.11 PRIMARY OSTEOARTHRITIS OF RIGHT KNEE: ICD-10-CM

## 2024-12-23 DIAGNOSIS — Z01.818 PREOPERATIVE EXAMINATION: Primary | ICD-10-CM

## 2024-12-23 DIAGNOSIS — D64.9 ANEMIA, UNSPECIFIED TYPE: Primary | ICD-10-CM

## 2024-12-23 DIAGNOSIS — N18.9 CHRONIC KIDNEY DISEASE, UNSPECIFIED CKD STAGE: ICD-10-CM

## 2024-12-23 LAB
ALBUMIN SERPL BCP-MCNC: 4.3 G/DL (ref 3.4–5)
ALBUMIN SERPL BCP-MCNC: 4.3 G/DL (ref 3.4–5)
ALP SERPL-CCNC: 56 U/L (ref 33–136)
ALT SERPL W P-5'-P-CCNC: 10 U/L (ref 7–45)
ANION GAP SERPL CALC-SCNC: 12 MMOL/L (ref 10–20)
ANION GAP SERPL CALC-SCNC: 13 MMOL/L (ref 10–20)
AST SERPL W P-5'-P-CCNC: 16 U/L (ref 9–39)
ATRIAL RATE: 71 BPM
BASOPHILS # BLD AUTO: 0.02 X10*3/UL (ref 0–0.1)
BASOPHILS NFR BLD AUTO: 0.3 %
BILIRUB SERPL-MCNC: 0.4 MG/DL (ref 0–1.2)
BUN SERPL-MCNC: 44 MG/DL (ref 6–23)
BUN SERPL-MCNC: 45 MG/DL (ref 6–23)
CALCIUM SERPL-MCNC: 9.5 MG/DL (ref 8.6–10.3)
CALCIUM SERPL-MCNC: 9.6 MG/DL (ref 8.6–10.3)
CHLORIDE SERPL-SCNC: 105 MMOL/L (ref 98–107)
CHLORIDE SERPL-SCNC: 105 MMOL/L (ref 98–107)
CO2 SERPL-SCNC: 27 MMOL/L (ref 21–32)
CO2 SERPL-SCNC: 27 MMOL/L (ref 21–32)
CREAT SERPL-MCNC: 1.83 MG/DL (ref 0.5–1.05)
CREAT SERPL-MCNC: 1.88 MG/DL (ref 0.5–1.05)
EGFRCR SERPLBLD CKD-EPI 2021: 27 ML/MIN/1.73M*2
EGFRCR SERPLBLD CKD-EPI 2021: 28 ML/MIN/1.73M*2
EOSINOPHIL # BLD AUTO: 0.03 X10*3/UL (ref 0–0.4)
EOSINOPHIL NFR BLD AUTO: 0.4 %
ERYTHROCYTE [DISTWIDTH] IN BLOOD BY AUTOMATED COUNT: 12 % (ref 11.5–14.5)
FERRITIN SERPL-MCNC: 83 NG/ML (ref 8–150)
GLUCOSE SERPL-MCNC: 126 MG/DL (ref 74–99)
GLUCOSE SERPL-MCNC: 129 MG/DL (ref 74–99)
HCT VFR BLD AUTO: 36 % (ref 36–46)
HGB BLD-MCNC: 11.3 G/DL (ref 12–16)
IMM GRANULOCYTES # BLD AUTO: 0.04 X10*3/UL (ref 0–0.5)
IMM GRANULOCYTES NFR BLD AUTO: 0.5 % (ref 0–0.9)
IRON SATN MFR SERPL: 17 % (ref 25–45)
IRON SERPL-MCNC: 55 UG/DL (ref 35–150)
LYMPHOCYTES # BLD AUTO: 1.45 X10*3/UL (ref 0.8–3)
LYMPHOCYTES NFR BLD AUTO: 19.2 %
MCH RBC QN AUTO: 30 PG (ref 26–34)
MCHC RBC AUTO-ENTMCNC: 31.4 G/DL (ref 32–36)
MCV RBC AUTO: 96 FL (ref 80–100)
MONOCYTES # BLD AUTO: 0.53 X10*3/UL (ref 0.05–0.8)
MONOCYTES NFR BLD AUTO: 7 %
NEUTROPHILS # BLD AUTO: 5.47 X10*3/UL (ref 1.6–5.5)
NEUTROPHILS NFR BLD AUTO: 72.6 %
NRBC BLD-RTO: 0 /100 WBCS (ref 0–0)
P AXIS: 79 DEGREES
P OFFSET: 189 MS
P ONSET: 163 MS
PHOSPHATE SERPL-MCNC: 3.8 MG/DL (ref 2.5–4.9)
PLATELET # BLD AUTO: 239 X10*3/UL (ref 150–450)
POTASSIUM SERPL-SCNC: 4.2 MMOL/L (ref 3.5–5.3)
POTASSIUM SERPL-SCNC: 4.3 MMOL/L (ref 3.5–5.3)
PR INTERVAL: 114 MS
PROT SERPL-MCNC: 6.6 G/DL (ref 6.4–8.2)
Q ONSET: 220 MS
QRS COUNT: 12 BEATS
QRS DURATION: 70 MS
QT INTERVAL: 388 MS
QTC CALCULATION(BAZETT): 421 MS
QTC FREDERICIA: 410 MS
R AXIS: 55 DEGREES
RBC # BLD AUTO: 3.77 X10*6/UL (ref 4–5.2)
SODIUM SERPL-SCNC: 140 MMOL/L (ref 136–145)
SODIUM SERPL-SCNC: 141 MMOL/L (ref 136–145)
T AXIS: 69 DEGREES
T OFFSET: 414 MS
TIBC SERPL-MCNC: 321 UG/DL (ref 240–445)
UIBC SERPL-MCNC: 266 UG/DL (ref 110–370)
VENTRICULAR RATE: 71 BPM
WBC # BLD AUTO: 7.5 X10*3/UL (ref 4.4–11.3)

## 2024-12-23 PROCEDURE — 87081 CULTURE SCREEN ONLY: CPT | Mod: GEALAB

## 2024-12-23 PROCEDURE — 99204 OFFICE O/P NEW MOD 45 MIN: CPT | Performed by: NURSE PRACTITIONER

## 2024-12-23 PROCEDURE — 36415 COLL VENOUS BLD VENIPUNCTURE: CPT

## 2024-12-23 PROCEDURE — 93005 ELECTROCARDIOGRAM TRACING: CPT

## 2024-12-23 PROCEDURE — 82728 ASSAY OF FERRITIN: CPT

## 2024-12-23 PROCEDURE — 80053 COMPREHEN METABOLIC PANEL: CPT

## 2024-12-23 PROCEDURE — 82607 VITAMIN B-12: CPT | Mod: GEALAB

## 2024-12-23 PROCEDURE — 83550 IRON BINDING TEST: CPT

## 2024-12-23 PROCEDURE — 84075 ASSAY ALKALINE PHOSPHATASE: CPT

## 2024-12-23 PROCEDURE — 85025 COMPLETE CBC W/AUTO DIFF WBC: CPT

## 2024-12-23 RX ORDER — BUTYROSPERMUM PARKII(SHEA BUTTER), SIMMONDSIA CHINENSIS (JOJOBA) SEED OIL, ALOE BARBADENSIS LEAF EXTRACT .01; 1; 3.5 G/100G; G/100G; G/100G
250 LIQUID TOPICAL DAILY
COMMUNITY

## 2024-12-23 RX ORDER — CHLORHEXIDINE GLUCONATE ORAL RINSE 1.2 MG/ML
15 SOLUTION DENTAL DAILY
Qty: 30 ML | Refills: 0 | Status: SHIPPED | OUTPATIENT
Start: 2024-12-23 | End: 2024-12-25

## 2024-12-23 RX ORDER — BISMUTH SUBSALICYLATE 262 MG
1 TABLET,CHEWABLE ORAL DAILY
COMMUNITY

## 2024-12-23 RX ORDER — CHLORHEXIDINE GLUCONATE 40 MG/ML
1 SOLUTION TOPICAL DAILY
Start: 2024-12-23 | End: 2024-12-28

## 2024-12-23 ASSESSMENT — ENCOUNTER SYMPTOMS
RESPIRATORY NEGATIVE: 1
CONSTITUTIONAL NEGATIVE: 1
NECK NEGATIVE: 1
CARDIOVASCULAR NEGATIVE: 1
MUSCULOSKELETAL NEGATIVE: 1
GASTROINTESTINAL NEGATIVE: 1
NEUROLOGICAL NEGATIVE: 1

## 2024-12-23 ASSESSMENT — DUKE ACTIVITY SCORE INDEX (DASI)
CAN YOU DO LIGHT WORK AROUND THE HOUSE LIKE DUSTING OR WASHING DISHES: YES
CAN YOU DO MODERATE WORK AROUND THE HOUSE LIKE VACUUMING, SWEEPING FLOORS OR CARRYING GROCERIES: YES
CAN YOU PARTICIPATE IN STRENOUS SPORTS LIKE SWIMMING, SINGLES TENNIS, FOOTBALL, BASKETBALL, OR SKIING: NO
TOTAL_SCORE: 42.7
CAN YOU DO HEAVY WORK AROUND THE HOUSE LIKE SCRUBBING FLOORS OR LIFTING AND MOVING HEAVY FURNITURE: YES
CAN YOU WALK INDOORS, SUCH AS AROUND YOUR HOUSE: YES
CAN YOU PARTICIPATE IN MODERATE RECREATIONAL ACTIVITIES LIKE GOLF, BOWLING, DANCING, DOUBLES TENNIS OR THROWING A BASEBALL OR FOOTBALL: YES
CAN YOU WALK A BLOCK OR TWO ON LEVEL GROUND: YES
DASI METS SCORE: 8
CAN YOU DO YARD WORK LIKE RAKING LEAVES, WEEDING OR PUSHING A MOWER: YES
CAN YOU CLIMB A FLIGHT OF STAIRS OR WALK UP A HILL: YES
CAN YOU HAVE SEXUAL RELATIONS: YES
CAN YOU TAKE CARE OF YOURSELF (EAT, DRESS, BATHE, OR USE TOILET): YES
CAN YOU RUN A SHORT DISTANCE: NO

## 2024-12-23 ASSESSMENT — ACTIVITIES OF DAILY LIVING (ADL): ADL_SCORE: 0

## 2024-12-23 NOTE — PREPROCEDURE INSTRUCTIONS
Thank you for visiting [facility]) today for your pre-procedure evaluation, you were seen by     Leanne Cherry CNP  Pre Admission Testing  [facility]  [phone]    This summary includes instructions and information to aid you during your perioperative period.  Please read carefully. If you have any questions about your visit today, please call the number listed above.  If you become ill or have any changes to your health before your surgery, please contact your primary care provider and alert your surgeon.      Preparing for your Surgery       Exercises  Preoperative Deep Breathing Exercises  Why it is important to do deep breathing exercises before my surgery?  Deep breathing exercises strengthen your breathing muscles.  This helps you to recover after your surgery and decreases the chance of breathing complications.  How are the deep breathing exercises done?  Sit straight with your back supported.  Breathe in deeply and slowly through your nose. Your lower rib cage should expand and your abdomen may move forward.  Hold that breath for 3 to 5 seconds.  Breathe out through pursed lips, slowly and completely.  Rest and repeat 10 times every hour while awake.  Rest longer if you become dizzy or lightheaded.       Preoperative Brain Exercises    What are brain exercises?  A brain exercise is any activity that engages your thinking (cognitive) skills.    What types of activities are considered brain exercises?  Jigsaw puzzles, crossword puzzles, word jumble, memory games, word search, and many more.  Many can be found free online or on your phone via a mobile kerri.    Why should I do brain exercises before my surgery?  More recent research has shown brain exercise before surgery can lower the risk of postoperative delirium (confusion) which can be especially important for older adults.  Patients who did brain exercises for 5 to 10 hours the days before surgery, cut their risk  of postoperative delirium in half up to 1 week after surgery.    Sit-to-Stand Exercise    What is the sit-to-stand exercise?  The sit-to-stand exercise strengthens the muscles of your lower body and muscles in the center of your body (core muscles for stability) helping to maintain and improve your strength and mobility.  How do I do the sit-to-stand exercise?  The goal is to do this exercise without using your arms or hands.  If this is too difficult, use your arms and hands or a chair with armrests to help slowly push yourself to the standing position and lower yourself back to the sitting position. As the movement becomes easier use your arms and hands less.    Steps to the sit-to-stand exercise  Sit up tall in a sturdy chair, knees bent, feet flat on the floor shoulder-width apart.  Shift your hips/pelvis forward in the chair to correctly position yourself for the next movement.  Lean forward at your hips.  Stand up straight putting equal weight on both feet.  Check to be sure you are properly aligned with the chair, in a slow controlled movement sit back down.  Repeat this exercise 10-15 times.  If needed you can do it fewer times until your strength improves.  Rest for 1 minute.  Do another 10-15 sit-to-stand exercises.  Try to do this in the morning and evening.        Instructions    Preoperative Fasting Guidelines    Why must I stop eating and drinking near surgery time?  With sedation, food or liquid in your stomach can enter your lungs causing serious complications  Food can increase nausea and vomiting  When do I need to stop eating and drinking before my surgery?      Do not eat any food after midnight the night before your surgery/procedure. You may have up to 13.5 ounces of clear liquid until TWO hours before your instructed arrival time to the hospital.  This includes water, black tea/coffee, (no milk or cream) apple juice, and electrolyte drinks (Gatorade). You may chew gum until TWO hours before  your surgery/procedure            Simple things you can do to help prevent blood clots     Blood clots are blockages that can form in the body's veins. When a blood clot forms in your deep veins, it may be called a deep vein thrombosis, or DVT for short. Blood clots can happen in any part of the body where blood flows, but they are most common in the arms and legs. If a piece of a blood clot breaks free and travels to the lungs, it is called a pulmonary embolus (PE). A PE can be a very serious problem.         Being in the hospital or having surgery can raise your chances of getting a blood clot because you may not be well enough to move around as much as you normally do.         Ways you can help prevent blood clots in the hospital       Wearing SCDs  SCDs stands for Sequential Compression Devices.   SCDs are special sleeves that wrap around your legs. They attach to a pump that fills them with air to gently squeeze your legs every few minutes.  This helps return the blood in your legs to your heart.   SCDs should only be taken off when walking or bathing. SCDs may not be comfortable, but they can help save your life.              Pump SCD leg sleeves  Wearing compression stockings - if your doctor orders them. These special snug-fitting stockings gently squeeze your legs to help blood flow.       Walking. Walking helps move the blood in your legs.   If your doctor says it is ok, try walking the halls at least   5 times a day. Ask us to help you get up, so you don't fall.      Taking any blood-thinning medicines your doctor orders.              Ways you can help prevent blood clots at home         Wearing compression stockings - if your doctor orders them.   Walking - to help move the blood in your legs.    Taking any blood-thinning medicines your doctor orders.      Signs of a blood clot or PE    Tell your doctor or nurse right away if you have any of the problems listed below.         If you are at home, seek  "medical care right away. Call 911 for chest pain or problems breathing.            Signs of a blood clot (DVT) - such as pain, swelling, redness, or warmth in your arm or legs.  Signs of a pulmonary embolism (PE) - such as chest pain or feeling short of breath      Tobacco and Alcohol;  Do not drink alcohol or smoke within 24 hours of surgery.  It is best to quit smoking for as long as possible before any surgery or procedure.  Other Instructions  Why did I have my nose, under my arms, and groin swabbed? The purpose of the swab is to identify Staphylococcus aureus inside your nose or on your skin.  The swab was sent to the laboratory for culture.  A positive swab/culture for Staphylococcus aureus is called colonization or carriage.     What is Staphylococcus aureus? Staphylococcus aureus, also known as \"staph\", is a germ found on the skin or in the nose of healthy people.  Sometimes Staphylococcus aureus can get into the body and cause an infection.  This can be minor (such as pimples, boils, or other skin problems).  It might also be serious (such as a blood infection, pneumonia, or a surgical site infection).     What is Staphylococcus aureus colonization or carriage? Colonization or carriage means that a person has the germ but is not sick from it.  These bacteria can be spread on the hands or when breathing or sneezing.   How is Staphylococcus aureus spread? It is most often spread by close contact with a person or item that carries it.   What happens if my culture is positive for Staphylococcus aureus? Your doctor/medical team will use this information to guide any antibiotic treatment which may be necessary.  Regardless of the culture results, we will clean the inside of your nose with a betadine swab just before you have your surgery.   Will I get an infection if I have Staphylococcus aureus in my nose or on my skin? Anyone can get an infection with Staphylococcus aureus.  However, the best way to reduce your " risk of infection is to follow the instructions provided to you for the use of your CHG soap and dental rinse.      Body Wash:     What is a home preoperative antibacterial shower? This shower is a way of cleaning the skin with a germ-killing solution before surgery.  The solution contains chlorhexidine, commonly known as CHG.  CHG is a skin cleanser with germ-killing ability.  Let your doctor know if you are allergic to chlorhexidine.   Why do I need to take a preoperative antibacterial shower? Skin is not sterile.  It is best to try to make your skin as free of germs as possible before surgery.  Proper cleansing with a germ-killing soap before surgery can lower the number of germs on your skin.  This helps to reduce the risk of infection at the surgical site.    Following the instructions listed below will help you prepare your skin for surgery.   How do I use the solution?  If you plan to wash your hair, use your regular shampoo; then rinse your hair and body thoroughly to remove any shampoo residue  Wash your face with your regular soap or water only  Thoroughly rinse your body with water from the neck down  Apply Hibiclens directly on your skin or on a wet washcloth and wash gently; move away from the shower stream when applying Hibiclens to avoid rinsing it off too soon  Rinse thoroughly with warm water and keep out of eyes, ears and mouth; if Hibiclens comes in contact with these areas, rinse out promptly  Dry your skin with a towel  Do not use your regular soap after applying and rinsing with Hibiclens  Do not apply lotions or deodorants to the cleaned body area      Oral/Dental Rinse:     What is oral/dental rinse?  It is a mouthwash. It is a way of cleaning the mouth with a germ-killing solution before your surgery.  The solution contains chlorhexidine, commonly known as CHG. It is used inside the mouth to kill a bacteria known as Staphylococcus aureus.  Let your doctor know if you are allergic to  Chlorhexidine.   Why do I need to use CHG oral/dental rinse? The CHG oral/dental rinse helps to kill a bacteria in your mouth known as Staphylococcus aureus.  This reduces the risk of infection at the surgical site.    Using your CHG oral/dental rinse STEPS: Use your CHG oral/dental rinse after you brush your teeth the night before (at bedtime) and the morning of your surgery.  Follow all directions on your prescription label.    Use the cap on the container to measure 15 ml.  Swish (gargle if you can) the mouthwash in your mouth for at least 30 seconds, (do not swallow) and spit out.  After you use your CHG rinse, do not rinse your mouth with water, drink or eat.    Please refer to the prescription label for the appropriate time to resume oral intake   What side effects might I have using the CHG oral/dental rinse? CHG rinse will stick to plaque on the teeth.  Brush and floss just before use.  Teeth brushing will help avoid staining of plaque during use.          The Week before Surgery        Seven days before Surgery  Check your PAT medication instructions  Do the exercises provided to you by [facility]  Arrange for a responsible, adult licensed  to take you home after surgery and stay with you for 24 hours.  You will not be permitted to drive yourself home if you have received any anesthetic/sedation  Six days before surgery  Check your PAT medication instructions  Do the exercises provided to you by PAT  Start using Chlorhexidene (CHG) body wash if prescribed  Five days before surgery  Check your PAT medication instructions  Do the exercises provided to you by PAT  Continue to use CHG body wash if prescribed  Three days before surgery  Check your PAT medication instructions  Do the exercises provided to you by PAT  Continue to use CHG body wash if prescribed  Two days before surgery  Check your [facility] medication instructions  Do the exercises provided to you by [facility]  Continue to use CHG body wash if  prescribed    The Day before Surgery       Check your PAT medication and all other PAT instructions including when to stop eating and drinking  You will be called with your arrival time for surgery in the late afternoon.  If you do not receive a call please reach out to [facility]. [phone]  Do not smoke or drink 24 hours before surgery  Prepare items to bring with you to the hospital  Shower with your chlorhexidine wash if prescribed  Brush your teeth and use your chlorhexidine dental rinse if prescribed    The Day of Surgery       Check your PAT medication instructions  Ensure you follow the instructions for when to stop eating and drinking  Shower, if prescribed use CHG.  Do not apply any lotions, creams, moisturizers, perfume or deodorant  Brush your teeth and use your CHG dental rinse if prescribed  Wear loose comfortable clothing  Avoid make-up  Remove  jewelry and piercings, consider professional piercing removal with a plastic spacer if needed  Bring photo ID and Insurance card  Bring an accurate medication list that includes medication dose, frequency and allergies  Bring a copy of your advanced directives (will, health care power of )  Bring any devices and controllers as well as medical devices you have been provided with for surgery (CPAP, slings, braces, etc.)  Dentures, eyeglasses, and contacts will be removed before surgery, please bring cases for contacts or glasses

## 2024-12-23 NOTE — H&P (VIEW-ONLY)
CPM/PAT Evaluation       Name: Vicky Govea (Vicky Govea)  /Age: 1945/79 y.o.     Visit Type:   In-Person       Chief Complaint: OA of right knee    HPI 78 y/o female scheduled for open total knee arthroplasty-right on 2025 with  Dr. Forrester secondary to OA of right knee.  PMHX includes HTN, CKD III, IBS, GERD RA, chondrocalcinosis (right wrist), pseudogout .  PAT is consulted today for perioperative risk stratification and optimization.      Past Medical History:   Diagnosis Date    Abnormal urine findings 2023    Acquired spondylolisthesis     Acute back pain 2023    Acute bronchitis with bronchospasm 2023    Acute kidney injury (CMS-HCC) 2023    Repeat cbc,cmp    Acute non-recurrent frontal sinusitis 2023    - augmentin twice a day 7 days  -  Nasal saline, increase fluids  -  hand hygiene and infection prevention discussed  -  Warm compress to sinuses  - humidification  - recommend to eat yogurt twice daily while taking antibiotic or a daily probiotic       Acute pharyngitis due to other specified organisms 2019    Acute bacterial pharyngitis    Acute upper respiratory infection, unspecified 2018    Acute URI    Age-related nuclear cataract of left eye 2023    Age-related nuclear cataract of right eye 2023    Allergic     Allergic contact dermatitis due to plants, except food 2018    Contact dermatitis due to poison oak    Allergic reaction to contrast dye 2023    Angina pectoris 2023    Arthritis     Arthritis of right knee 2023    Back pain 2023    Benign paroxysmal positional vertigo 2021    Body mass index (BMI) 26.0-26.9, adult 2021    Body mass index (BMI) of 26.0 to 26.9 in adult    Calculus of kidney 2015    Recurrent kidney stones    Carpal tunnel syndrome of left wrist 2023    Carpal tunnel syndrome of right wrist 2023    Carpal tunnel syndrome, left upper limb  08/02/2021    Carpal tunnel syndrome of left wrist    Carpal tunnel syndrome, right upper limb 08/02/2021    Carpal tunnel syndrome of right wrist    Cataract, nuclear sclerotic, both eyes 05/22/2023    Cheilitis 09/02/2021    Chronic epigastric pain 09/12/2023    Chronic kidney disease     Chronic low back pain 05/22/2023    Colitis 09/12/2023    Contusion of left front wall of thorax, initial encounter 12/15/2019    Contusion of left chest wall, initial encounter    Contusion of right front wall of thorax, initial encounter 12/15/2019    Contusion of right chest wall, initial encounter    Contusion of right hand 05/22/2023    Contusion of right hand, initial encounter 10/13/2021    Contusion of right hand, initial encounter    Difficulty in walking, not elsewhere classified 03/19/2020    Difficulty walking    Difficulty walking 05/22/2023    Diverticulitis of intestine, part unspecified, without perforation or abscess without bleeding 11/15/2018    Acute diverticulitis    Diverticulosis of colon 05/22/2023    Diverticulosis of large intestine without perforation or abscess without bleeding 10/22/2021    Diverticulosis of colon    Dorsalgia, unspecified 09/01/2022    Acute back pain    Dorsalgia, unspecified 11/15/2018    Acute back pain    Drusen (degenerative) of macula, bilateral 05/22/2023    Dry eye syndrome of left lacrimal gland 06/08/2020    Dry eye syndrome of left lacrimal gland    Encounter for follow-up examination after completed treatment for conditions other than malignant neoplasm 03/04/2016    Hospital discharge follow-up    Epigastric pain 03/03/2016    Dyspepsia    Esophageal reflux 05/22/2023    Estrogen deficiency 05/22/2023    Eyelid abnormality 05/22/2023    Gait difficulty 05/22/2023    Gastritis 09/12/2023    Gastro-esophageal reflux disease with esophagitis, without bleeding 10/30/2020    Reflux esophagitis    Generalized abdominal pain 11/19/2018    Acute generalized abdominal pain     Greater trochanteric bursitis of left hip 05/22/2023    Hemangioma of skin and subcutaneous tissue 09/02/2021    Hiatal hernia 05/22/2023    Hip arthritis 09/17/2013    History of total left knee replacement 05/22/2023    Hyperlipidemia     Hypermetropia, bilateral 07/08/2022    Hypermetropia of both eyes    Hyperopia of both eyes 05/22/2023    Hypertension     Hypomagnesemia 06/17/2021    Hypomagnesemia    IBS (irritable bowel syndrome)     Idiopathic neuropathy     Inflamed seborrheic keratosis 09/02/2021    Injury of finger of right hand 05/22/2023    Left upper quadrant pain 03/03/2016    LUQ pain    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    Lentigines 09/02/2021    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    Lumbar radicular pain 07/10/2013    Macular degeneration     bilaterally    Mass of joint of left wrist 05/22/2023    Melanocytic nevi of trunk 09/02/2021    Melanocytic nevi of unspecified lower limb, including hip 09/02/2021    Melanocytic nevi of unspecified upper limb, including shoulder 09/02/2021    Menopausal symptom 09/12/2023    Mild vitamin D deficiency 05/22/2023    Muscle spasm of back 05/01/2017    Back muscle spasm    Myalgia due to statin 05/22/2023    Neoplasm of uncertain behavior of skin 09/02/2021    Neurogenic 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 05/22/2023    Ocular rosacea 05/22/2023    Ocular rosacea 05/22/2023    Osteoarthritis, hand 05/22/2023    Osteoarthritis, knee 02/13/2015    Osteopenia 09/12/2023    Osteoporosis     Other long term (current) drug therapy 07/11/2022    Encounter for long-term current use of high risk medication    Other nail disorders 10/09/2018    Leukonychia striata    Other reduced mobility 03/19/2020    Impaired mobility and ADLs    Other specified abnormal findings of blood chemistry 10/09/2018    Elevated serum creatinine    Other specified abnormal findings of blood chemistry 11/28/2016    Abnormal thyroid blood test    Other specified  joint disorders, left wrist 02/08/2021    Mass of joint of left wrist    Other specified postprocedural states 09/19/2017    History of colonoscopy    Other symptoms and signs involving the musculoskeletal system 07/16/2021    Wrist weakness    Other symptoms and signs involving the musculoskeletal system 10/28/2019    Weakness of both lower extremities    Overweight 10/26/2021    Overweight with body mass index (BMI) of 26 to 26.9 in adult    Pain due to internal orthopedic prosthetic devices, implants and grafts, initial encounter (CMS-HCC) 01/14/2020    Pain due to total knee replacement, initial encounter    Pain in both hands 05/22/2023    Pain in both wrists 05/22/2023    Pain in left hip 06/25/2021    Pain of left hip joint    Pain in left knee 04/16/2020    Left knee pain    Pain in right hand 07/21/2021    Pain in both hands    Pain in right knee 10/13/2021    Right knee pain, unspecified chronicity    Pain in right shoulder 11/22/2019    Right shoulder pain    Pain in right wrist 07/21/2021    Pain in both wrists    Personal history of diseases of the skin and subcutaneous tissue 06/26/2017    History of urticaria    Personal history of diseases of the skin and subcutaneous tissue 09/06/2018    History of contact dermatitis    Personal history of other diseases of the musculoskeletal system and connective tissue 02/06/2020    History of muscle pain    Personal history of other diseases of the nervous system and sense organs 01/05/2017    History of sleep disturbance    Personal history of other diseases of the respiratory system 05/11/2018    History of acute sinusitis    Personal history of other endocrine, nutritional and metabolic disease 04/27/2020    History of estrogen deficiency    Personal history of other specified conditions 03/03/2016    History of epigastric pain    Personal history of other specified conditions 10/25/2022    History of weakness    Personal history of other specified conditions  12/04/2021    History of vertigo    Personal history of other specified conditions 07/05/2022    History of epigastric pain    Personal history of other specified conditions 10/28/2019    History of gait disorder    Personal history of other specified conditions 01/09/2017    History of fatigue    Personal history of transient ischemic attack (TIA), and cerebral infarction without residual deficits 12/28/2015    History of stroke    Pleurodynia 12/15/2019    Rib pain on left side    Presence of unspecified artificial hip joint 05/15/2020    Status post revision of total hip replacement    Rash of face 05/22/2023    Renal mass, right 05/22/2023    Rib pain on left side 05/22/2023    Rib pain on right side 05/22/2023    Xray chest    Right knee pain 05/22/2023    Right shoulder pain 05/22/2023    Right upper quadrant pain 12/18/2017    Abdominal pain, RUQ    Rosacea 05/22/2023    Scar adherent 05/22/2023    Scar condition and fibrosis of skin 09/02/2021    Scar conditions and fibrosis of skin 07/02/2021    Scar adherent    Seborrheic dermatitis, unspecified 09/02/2021    Spinal stenosis of lumbar region 07/10/2013    Stroke (Multi)     Trigger finger, acquired 05/22/2023    Trochanteric bursitis, left hip 05/15/2020    Greater trochanteric bursitis of left hip    Ulcerative blepharitis 05/22/2023    Vitamin D deficiency, unspecified 06/09/2016    Hypovitaminosis D    Weakness of both lower extremities 05/22/2023    Wrist stiffness 05/22/2023    Wrist weakness 05/22/2023       Past Surgical History:   Procedure Laterality Date    CARPAL TUNNEL RELEASE Bilateral     EYE SURGERY      HIP SURGERY  12/28/2015    Hip Surgery    HYSTERECTOMY  12/28/2015    Hysterectomy    JOINT REPLACEMENT Left     TKA    MR HEAD ANGIO WO IV CONTRAST  12/29/2012    MR HEAD ANGIO WO IV CONTRAST [facility]    MR NECK ANGIO WO IV CONTRAST  12/29/2012    MR NECK ANGIO WO IV CONTRAST [facility]    TOTAL HIP ARTHROPLASTY   12/28/2015    Hip Replacement       Patient  reports that she is not currently sexually active.    Family History   Problem Relation Name Age of Onset    Diabetes Mother Shawanda Root     Arthritis Mother Shawanda Root     Stroke Mother Shawanda Root     Other (pacemaker) Father      Breast cancer Other grandmother     Breast cancer Sibling         Allergies   Allergen Reactions    Benzocaine Other    Aspirin Other    Nsaids (Non-Steroidal Anti-Inflammatory Drug) Other     Pt stated she should not take while on plavix    Simvastatin Myalgia    Tramadol Other and Rash       Prior to Admission medications    Medication Sig Start Date End Date Taking? Authorizing Provider   amLODIPine (Norvasc) 10 mg tablet Take 1 tablet (10 mg) by mouth once daily. 9/7/24   JOHN Melara   atorvastatin (Lipitor) 10 mg tablet TAKE 1 TABLET DAILY AT BEDTIME 8/21/24   NATE Melara-CNP   calcium carbonate-vitamin D3 500 mg-5 mcg (200 unit) tablet Take 1 tablet by mouth once daily.    Historical Provider, MD   cholecalciferol (Vitamin D-3) 50 mcg (2,000 unit) capsule Take 1 capsule (50 mcg) by mouth early in the morning.. 12/28/15   Historical Provider, MD   clopidogrel (Plavix) 75 mg tablet Take 1 tablet (75 mg) by mouth once daily. 4/15/24 4/15/25  NATE Melara-CNP   cranberry fruit 400 mg tablet Take 1 tablet by mouth once daily. 3/23/17   Historical Provider, MD   famotidine (Pepcid) 40 mg tablet Take 1 tablet (40 mg) by mouth as needed at bedtime for heartburn. 12/12/24 12/12/25  Chelsey Gong APRN-CNP   gabapentin (Neurontin) 100 mg capsule Take 1 capsule (100 mg) by mouth once daily at bedtime. 12/10/24   NATE Anderson-BRYAN   hydroxychloroquine (Plaquenil) 200 mg tablet Take 1 tablet (200 mg) by mouth once daily. 10/10/24 1/8/25  Sheela Ricks MD   hyoscyamine (Anaspaz, Levsin) 0.125 mg tablet Take 1 tablet (0.125 mg) by mouth every 6 hours if needed for  cramping or diarrhea. 8/21/24 8/21/25  Chelsey SOLORZANO ANDREZ GongN-CNP   lisinopril 30 mg tablet Take 1 tablet (30 mg) by mouth once daily. 12/12/24 3/12/25  Cierra NATE Land-CNP   magnesium oxide-Mg AA chelate 300 mg capsule Take 1 capsule (300 mg) by mouth once daily.    Historical Provider, MD   vit A/vit C/vit E/zinc/copper (PRESERVISION AREDS ORAL) Take by mouth. 12/5/22   Historical Provider, MD GAMA ROS:   Constitutional:   neg    Neuro/Psych:   neg    Eyes:    use of corrective lenses  Ears:   neg    Nose:   Mouth:   Throat:   Neck:   neg    Cardio:   neg    Respiratory:   neg    Endocrine:   GI:   neg    :   neg    Musculoskeletal:   neg    Hematologic:   neg    Skin:      Physical Exam  Vitals reviewed.   Constitutional:       Appearance: Normal appearance.   HENT:      Head: Normocephalic and atraumatic.      Mouth/Throat:      Mouth: Mucous membranes are moist.      Pharynx: Oropharynx is clear.   Eyes:      Extraocular Movements: Extraocular movements intact.      Pupils: Pupils are equal, round, and reactive to light.   Cardiovascular:      Rate and Rhythm: Normal rate and regular rhythm.   Pulmonary:      Effort: Pulmonary effort is normal.      Breath sounds: Normal breath sounds.   Abdominal:      General: Abdomen is flat.      Palpations: Abdomen is soft.   Musculoskeletal:         General: Normal range of motion.      Cervical back: Normal range of motion and neck supple.   Skin:     General: Skin is warm and dry.   Neurological:      General: No focal deficit present.      Mental Status: She is alert and oriented to person, place, and time.   Psychiatric:         Mood and Affect: Mood normal.         Behavior: Behavior normal.          Airway    Testing/Diagnostic:     Patient Specialist/PCP:     Visit Vitals  /61   Pulse 77   Temp 36.1 °C (97 °F) (Temporal)   Resp 18   Ht 1.524 m (5')   Wt 56 kg (123 lb 7.3 oz)   LMP  (LMP Unknown)   SpO2 98%   BMI 24.11 kg/m²   OB Status  Postmenopausal   Smoking Status Never   BSA 1.54 m²       DASI Risk Score      Flowsheet Row Pre-Admission Testing from 12/23/2024 in [facility]   Can you take care of yourself (eat, dress, bathe, or use toilet)?  2.75 filed at 12/23/2024 1346   Can you walk indoors, such as around your house? 1.75 filed at 12/23/2024 1346   Can you walk a block or two on level ground?  2.75 filed at 12/23/2024 1346   Can you climb a flight of stairs or walk up a hill? 5.5 filed at 12/23/2024 1346   Can you run a short distance? 0 filed at 12/23/2024 1346   Can you do light work around the house like dusting or washing dishes? 2.7 filed at 12/23/2024 1346   Can you do moderate work around the house like vacuuming, sweeping floors or carrying groceries? 3.5 filed at 12/23/2024 1346   Can you do heavy work around the house like scrubbing floors or lifting and moving heavy furniture?  8 filed at 12/23/2024 1346   Can you do yard work like raking leaves, weeding or pushing a mower? 4.5 filed at 12/23/2024 1346   Can you have sexual relations? 5.25 filed at 12/23/2024 1346   Can you participate in moderate recreational activities like golf, bowling, dancing, doubles tennis or throwing a baseball or football? 6 filed at 12/23/2024 1346   Can you participate in strenous sports like swimming, singles tennis, football, basketball, or skiing? 0 filed at 12/23/2024 1346   DASI SCORE 42.7 filed at 12/23/2024 1346   METS Score (Will be calculated only when all the questions are answered) 8 filed at 12/23/2024 1346          Caprini DVT Assessment      Flowsheet Row Pre-Admission Testing from 12/23/2024 in [facility]   DVT Score 14 filed at 12/23/2024 1402   Surgical Factors Major surgery planned, including arthroscopic and laproscopic (1-2 hours), Elective major lower extremity arthroplasty filed at 12/23/2024 1402   Labs/Test Results Positive Lupus Anticoagulant filed at 12/23/2024 1402   BMI 30 or less filed at  12/23/2024 1402          Modified Frailty Index    No data to display       CHADS2 Stroke Risk  Current as of 41 minutes ago        N/A 3 to 100%: High Risk   2 to < 3%: Medium Risk   0 to < 2%: Low Risk     Last Change: N/A          This score determines the patient's risk of having a stroke if the patient has atrial fibrillation.        This score is not applicable to this patient. Components are not calculated.          Revised Cardiac Risk Index      Flowsheet Row Pre-Admission Testing from 12/23/2024 in [facility]   High-Risk Surgery (Intraperitoneal, Intrathoracic,Suprainguinal vascular) 0 filed at 12/24/2024 0836   History of ischemic heart disease (History of MI, History of positive exercuse test, Current chest paint considered due to myocardial ischemia, Use of nitrate therapy, ECG with pathological Q Waves) 0 filed at 12/24/2024 0836   History of congestive heart failure (pulmonary edemia, bilateral rales or S3 gallop, Paroxysmal nocturnal dyspnea, CXR showing pulmonary vascular redistribution) 0 filed at 12/24/2024 0836   History of cerebrovascular disease (Prior TIA or stroke) 1 filed at 12/24/2024 0836   Pre-operative insulin treatment 0 filed at 12/24/2024 0836   Pre-operative creatinine>2 mg/dl 0 filed at 12/24/2024 0836   Revised Cardiac Risk Calculator 1 filed at 12/24/2024 0836          Apfel Simplified Score      Flowsheet Row Pre-Admission Testing from 12/23/2024 in [facility]   Smoking status 1 filed at 12/24/2024 0837   History of motion sickness or PONV  0 filed at 12/24/2024 0837   Use of postoperative opioids 1 filed at 12/24/2024 0837   Gender - Female 1=Yes filed at 12/24/2024 0837   Apfel Simplified Score Calculator 3 filed at 12/24/2024 0837          Risk Analysis Index Results This Encounter         12/23/2024  5104             Do you live in a place other than your own home?: 1    Any kidney failure, kidney not working well, or seeing a kidney doctor  (nephrologist)? If yes, was this for kidney stones or another problem?: 8 Other (Comment)      ckd    Any history of chronic (long-term) congestive heart failure (CHF)?: 0 No    Any shortness of breath when resting?: 0 No    In the past five years, have you been diagnosed with or treated for cancer?: No    During the last 3 months has it become difficult for you to remember things or organize your thoughts?: 0 No    Have you lost weight of 10 pounds or more in the past 3 months without trying?: 0 No    Do you have any loss of appetitie?: 0 No    Getting Around (Mobility): 0 Can get around without help    Eatin Can plan and prepare own meals    Toiletin Can use toilet without any help    Personal Hygiene (Bathing, Hand Washing, Changing Clothes): 0 Can shower or bathe without any help    VIDAL Cancer History: Patient does not indicate history of cancer    Total Risk Analysis Index Score Without Cancer: 33    Total Risk Analysis Index Score: 33          Stop Bang Score      Flowsheet Row Pre-Admission Testing from 2024 in [facility]   Do you snore loudly? 0 filed at 2024 1346   Do you often feel tired or fatigued after your sleep? 0 filed at 2024 1346   Has anyone ever observed you stop breathing in your sleep? 0 filed at 2024 1346   Do you have or are you being treated for high blood pressure? 1 filed at 2024 1346   Recent BMI (Calculated) 24.8 filed at 2024 1346   Is BMI greater than 35 kg/m2? 0=No filed at 2024 1346   Age older than 50 years old? 1=Yes filed at 2024 1346   Is your neck circumference greater than 17 inches (Male) or 16 inches (Female)? 0 filed at 2024 1346   Gender - Male 0=No filed at 2024 1346   STOP-BANG Total Score 2 filed at 2024 1346          Prodigy: High Risk  Total Score: 12              Prodigy Age Score           ARISCAT Score for Postoperative Pulmonary Complications    No data to display       Alfred  Perioperative Risk for Myocardial Infarction or Cardiac Arrest (BALAJI)    No data to display             Assessment and Plan:       HPI 78 y/o female scheduled for open total knee arthroplasty-right on 1/13/2025 with  Dr. Forrester secondary to OA of right knee.  PMHX includes HTN, CKD III, IBS, SLE, GERD RA, chondrocalcinosis (right wrist), pseudogout .  PAT is consulted today for perioperative risk stratification and optimization.        Neuro:  No neurologic diagnosis, however, the patient is at increased risk for perioperative delirium secondary to  age, polypharmacy  Patient is at increased risk for perioperative CVA secondary to  previous CVA/TIA, HTN, increased age    HEENT:  No HEENT diagnosis or significant findings on chart review or clinical presentation and evaluation. No further preoperative testing/intervention indicated at this time.    Cardiovascular:  HTN - Managed by PCP   Continue Amlodipine  Holding Lisinopril day of procedure  METS: 8  RCRI: 1 point, 6.0% risk for postoperative MACE       Pulmonary:  No pulmonary diagnosis, however patient is at increased risk of perioperative complications secondary to  age > 60  Stop Bang score is 2 placing patient at low risk for [place]  PRODIGY: High risk for opioid induced respiratory depression  Pumonary education discussed, patient also provided deep breathing exerciseswith educational handout    Renal:   CKD III - Managed by PCP  Cr: 1.65-1.88  PCP encouraged increased fluid intake    Endocrine:  Follows with Rheumatology, Dr. Ricks.  Last seen on 11/7/2024.  Long term use of Plaquenil - will continue    Hematologic:  No hematologic diagnosis, however patient is at an increased risk for DVT  Caprini Score 17, patient at High risk for perioperative DVT.  Patient provided with VTE education/handout.    Gastrointestinal:   No GI diagnosis or significant findings on chart review or clinical presentation and evaluation.   Apfel 3    Infectious disease:   No  infectious diagnosis or significant findings on chart review or clinical presentation and evaluation.   Prescription provided for CHG body wash and dental rinse. CHG use instructions reviewed and provided to patient.  Staph screen collected    Musculoskeletal:   No diagnosis or significant findings on chart review or clinical presentation and evaluation.     Anesthesia/Airway:  No anesthesia complications      Medication instructions and NPO guidelines reviewed with the patient.  All questions or concerns discussed and addressed.      Holding all Multivitamins 1 week prior to procedure    Labs  and EKG ordered

## 2024-12-23 NOTE — CPM/PAT H&P
CPM/PAT Evaluation       Name: Vicky Govea (Vicky Govea)  /Age: 1945/79 y.o.     Visit Type:   In-Person       Chief Complaint: OA of right knee    HPI 78 y/o female scheduled for open total knee arthroplasty-right on 2025 with  Dr. Forrester secondary to OA of right knee.  PMHX includes HTN, CKD III, IBS, GERD RA, chondrocalcinosis (right wrist), pseudogout .  PAT is consulted today for perioperative risk stratification and optimization.      Past Medical History:   Diagnosis Date    Abnormal urine findings 2023    Acquired spondylolisthesis     Acute back pain 2023    Acute bronchitis with bronchospasm 2023    Acute kidney injury (CMS-HCC) 2023    Repeat cbc,cmp    Acute non-recurrent frontal sinusitis 2023    - augmentin twice a day 7 days  -  Nasal saline, increase fluids  -  hand hygiene and infection prevention discussed  -  Warm compress to sinuses  - humidification  - recommend to eat yogurt twice daily while taking antibiotic or a daily probiotic       Acute pharyngitis due to other specified organisms 2019    Acute bacterial pharyngitis    Acute upper respiratory infection, unspecified 2018    Acute URI    Age-related nuclear cataract of left eye 2023    Age-related nuclear cataract of right eye 2023    Allergic     Allergic contact dermatitis due to plants, except food 2018    Contact dermatitis due to poison oak    Allergic reaction to contrast dye 2023    Angina pectoris 2023    Arthritis     Arthritis of right knee 2023    Back pain 2023    Benign paroxysmal positional vertigo 2021    Body mass index (BMI) 26.0-26.9, adult 2021    Body mass index (BMI) of 26.0 to 26.9 in adult    Calculus of kidney 2015    Recurrent kidney stones    Carpal tunnel syndrome of left wrist 2023    Carpal tunnel syndrome of right wrist 2023    Carpal tunnel syndrome, left upper limb  08/02/2021    Carpal tunnel syndrome of left wrist    Carpal tunnel syndrome, right upper limb 08/02/2021    Carpal tunnel syndrome of right wrist    Cataract, nuclear sclerotic, both eyes 05/22/2023    Cheilitis 09/02/2021    Chronic epigastric pain 09/12/2023    Chronic kidney disease     Chronic low back pain 05/22/2023    Colitis 09/12/2023    Contusion of left front wall of thorax, initial encounter 12/15/2019    Contusion of left chest wall, initial encounter    Contusion of right front wall of thorax, initial encounter 12/15/2019    Contusion of right chest wall, initial encounter    Contusion of right hand 05/22/2023    Contusion of right hand, initial encounter 10/13/2021    Contusion of right hand, initial encounter    Difficulty in walking, not elsewhere classified 03/19/2020    Difficulty walking    Difficulty walking 05/22/2023    Diverticulitis of intestine, part unspecified, without perforation or abscess without bleeding 11/15/2018    Acute diverticulitis    Diverticulosis of colon 05/22/2023    Diverticulosis of large intestine without perforation or abscess without bleeding 10/22/2021    Diverticulosis of colon    Dorsalgia, unspecified 09/01/2022    Acute back pain    Dorsalgia, unspecified 11/15/2018    Acute back pain    Drusen (degenerative) of macula, bilateral 05/22/2023    Dry eye syndrome of left lacrimal gland 06/08/2020    Dry eye syndrome of left lacrimal gland    Encounter for follow-up examination after completed treatment for conditions other than malignant neoplasm 03/04/2016    Hospital discharge follow-up    Epigastric pain 03/03/2016    Dyspepsia    Esophageal reflux 05/22/2023    Estrogen deficiency 05/22/2023    Eyelid abnormality 05/22/2023    Gait difficulty 05/22/2023    Gastritis 09/12/2023    Gastro-esophageal reflux disease with esophagitis, without bleeding 10/30/2020    Reflux esophagitis    Generalized abdominal pain 11/19/2018    Acute generalized abdominal pain     Greater trochanteric bursitis of left hip 05/22/2023    Hemangioma of skin and subcutaneous tissue 09/02/2021    Hiatal hernia 05/22/2023    Hip arthritis 09/17/2013    History of total left knee replacement 05/22/2023    Hyperlipidemia     Hypermetropia, bilateral 07/08/2022    Hypermetropia of both eyes    Hyperopia of both eyes 05/22/2023    Hypertension     Hypomagnesemia 06/17/2021    Hypomagnesemia    IBS (irritable bowel syndrome)     Idiopathic neuropathy     Inflamed seborrheic keratosis 09/02/2021    Injury of finger of right hand 05/22/2023    Left upper quadrant pain 03/03/2016    LUQ pain    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    Lentigines 09/02/2021    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    Lumbar radicular pain 07/10/2013    Macular degeneration     bilaterally    Mass of joint of left wrist 05/22/2023    Melanocytic nevi of trunk 09/02/2021    Melanocytic nevi of unspecified lower limb, including hip 09/02/2021    Melanocytic nevi of unspecified upper limb, including shoulder 09/02/2021    Menopausal symptom 09/12/2023    Mild vitamin D deficiency 05/22/2023    Muscle spasm of back 05/01/2017    Back muscle spasm    Myalgia due to statin 05/22/2023    Neoplasm of uncertain behavior of skin 09/02/2021    Neurogenic 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 05/22/2023    Ocular rosacea 05/22/2023    Ocular rosacea 05/22/2023    Osteoarthritis, hand 05/22/2023    Osteoarthritis, knee 02/13/2015    Osteopenia 09/12/2023    Osteoporosis     Other long term (current) drug therapy 07/11/2022    Encounter for long-term current use of high risk medication    Other nail disorders 10/09/2018    Leukonychia striata    Other reduced mobility 03/19/2020    Impaired mobility and ADLs    Other specified abnormal findings of blood chemistry 10/09/2018    Elevated serum creatinine    Other specified abnormal findings of blood chemistry 11/28/2016    Abnormal thyroid blood test    Other specified  joint disorders, left wrist 02/08/2021    Mass of joint of left wrist    Other specified postprocedural states 09/19/2017    History of colonoscopy    Other symptoms and signs involving the musculoskeletal system 07/16/2021    Wrist weakness    Other symptoms and signs involving the musculoskeletal system 10/28/2019    Weakness of both lower extremities    Overweight 10/26/2021    Overweight with body mass index (BMI) of 26 to 26.9 in adult    Pain due to internal orthopedic prosthetic devices, implants and grafts, initial encounter (CMS-HCC) 01/14/2020    Pain due to total knee replacement, initial encounter    Pain in both hands 05/22/2023    Pain in both wrists 05/22/2023    Pain in left hip 06/25/2021    Pain of left hip joint    Pain in left knee 04/16/2020    Left knee pain    Pain in right hand 07/21/2021    Pain in both hands    Pain in right knee 10/13/2021    Right knee pain, unspecified chronicity    Pain in right shoulder 11/22/2019    Right shoulder pain    Pain in right wrist 07/21/2021    Pain in both wrists    Personal history of diseases of the skin and subcutaneous tissue 06/26/2017    History of urticaria    Personal history of diseases of the skin and subcutaneous tissue 09/06/2018    History of contact dermatitis    Personal history of other diseases of the musculoskeletal system and connective tissue 02/06/2020    History of muscle pain    Personal history of other diseases of the nervous system and sense organs 01/05/2017    History of sleep disturbance    Personal history of other diseases of the respiratory system 05/11/2018    History of acute sinusitis    Personal history of other endocrine, nutritional and metabolic disease 04/27/2020    History of estrogen deficiency    Personal history of other specified conditions 03/03/2016    History of epigastric pain    Personal history of other specified conditions 10/25/2022    History of weakness    Personal history of other specified conditions  12/04/2021    History of vertigo    Personal history of other specified conditions 07/05/2022    History of epigastric pain    Personal history of other specified conditions 10/28/2019    History of gait disorder    Personal history of other specified conditions 01/09/2017    History of fatigue    Personal history of transient ischemic attack (TIA), and cerebral infarction without residual deficits 12/28/2015    History of stroke    Pleurodynia 12/15/2019    Rib pain on left side    Presence of unspecified artificial hip joint 05/15/2020    Status post revision of total hip replacement    Rash of face 05/22/2023    Renal mass, right 05/22/2023    Rib pain on left side 05/22/2023    Rib pain on right side 05/22/2023    Xray chest    Right knee pain 05/22/2023    Right shoulder pain 05/22/2023    Right upper quadrant pain 12/18/2017    Abdominal pain, RUQ    Rosacea 05/22/2023    Scar adherent 05/22/2023    Scar condition and fibrosis of skin 09/02/2021    Scar conditions and fibrosis of skin 07/02/2021    Scar adherent    Seborrheic dermatitis, unspecified 09/02/2021    Spinal stenosis of lumbar region 07/10/2013    Stroke (Multi)     Trigger finger, acquired 05/22/2023    Trochanteric bursitis, left hip 05/15/2020    Greater trochanteric bursitis of left hip    Ulcerative blepharitis 05/22/2023    Vitamin D deficiency, unspecified 06/09/2016    Hypovitaminosis D    Weakness of both lower extremities 05/22/2023    Wrist stiffness 05/22/2023    Wrist weakness 05/22/2023       Past Surgical History:   Procedure Laterality Date    CARPAL TUNNEL RELEASE Bilateral     EYE SURGERY      HIP SURGERY  12/28/2015    Hip Surgery    HYSTERECTOMY  12/28/2015    Hysterectomy    JOINT REPLACEMENT Left     TKA    MR HEAD ANGIO WO IV CONTRAST  12/29/2012    MR HEAD ANGIO WO IV CONTRAST [facility]    MR NECK ANGIO WO IV CONTRAST  12/29/2012    MR NECK ANGIO WO IV CONTRAST [facility]    TOTAL HIP ARTHROPLASTY   12/28/2015    Hip Replacement       Patient  reports that she is not currently sexually active.    Family History   Problem Relation Name Age of Onset    Diabetes Mother Shawanda Root     Arthritis Mother Shawanda Root     Stroke Mother Shawanda Root     Other (pacemaker) Father      Breast cancer Other grandmother     Breast cancer Sibling         Allergies   Allergen Reactions    Benzocaine Other    Aspirin Other    Nsaids (Non-Steroidal Anti-Inflammatory Drug) Other     Pt stated she should not take while on plavix    Simvastatin Myalgia    Tramadol Other and Rash       Prior to Admission medications    Medication Sig Start Date End Date Taking? Authorizing Provider   amLODIPine (Norvasc) 10 mg tablet Take 1 tablet (10 mg) by mouth once daily. 9/7/24   JOHN Melara   atorvastatin (Lipitor) 10 mg tablet TAKE 1 TABLET DAILY AT BEDTIME 8/21/24   NAET Melara-CNP   calcium carbonate-vitamin D3 500 mg-5 mcg (200 unit) tablet Take 1 tablet by mouth once daily.    Historical Provider, MD   cholecalciferol (Vitamin D-3) 50 mcg (2,000 unit) capsule Take 1 capsule (50 mcg) by mouth early in the morning.. 12/28/15   Historical Provider, MD   clopidogrel (Plavix) 75 mg tablet Take 1 tablet (75 mg) by mouth once daily. 4/15/24 4/15/25  NATE Melara-CNP   cranberry fruit 400 mg tablet Take 1 tablet by mouth once daily. 3/23/17   Historical Provider, MD   famotidine (Pepcid) 40 mg tablet Take 1 tablet (40 mg) by mouth as needed at bedtime for heartburn. 12/12/24 12/12/25  Chelsey Gong APRN-CNP   gabapentin (Neurontin) 100 mg capsule Take 1 capsule (100 mg) by mouth once daily at bedtime. 12/10/24   NATE Anderson-BRYAN   hydroxychloroquine (Plaquenil) 200 mg tablet Take 1 tablet (200 mg) by mouth once daily. 10/10/24 1/8/25  Sheela Ricks MD   hyoscyamine (Anaspaz, Levsin) 0.125 mg tablet Take 1 tablet (0.125 mg) by mouth every 6 hours if needed for  cramping or diarrhea. 8/21/24 8/21/25  Chelsey SOLORZANO ANDREZ GongN-CNP   lisinopril 30 mg tablet Take 1 tablet (30 mg) by mouth once daily. 12/12/24 3/12/25  Cierra NATE Land-CNP   magnesium oxide-Mg AA chelate 300 mg capsule Take 1 capsule (300 mg) by mouth once daily.    Historical Provider, MD   vit A/vit C/vit E/zinc/copper (PRESERVISION AREDS ORAL) Take by mouth. 12/5/22   Historical Provider, MD GAMA ROS:   Constitutional:   neg    Neuro/Psych:   neg    Eyes:    use of corrective lenses  Ears:   neg    Nose:   Mouth:   Throat:   Neck:   neg    Cardio:   neg    Respiratory:   neg    Endocrine:   GI:   neg    :   neg    Musculoskeletal:   neg    Hematologic:   neg    Skin:      Physical Exam  Vitals reviewed.   Constitutional:       Appearance: Normal appearance.   HENT:      Head: Normocephalic and atraumatic.      Mouth/Throat:      Mouth: Mucous membranes are moist.      Pharynx: Oropharynx is clear.   Eyes:      Extraocular Movements: Extraocular movements intact.      Pupils: Pupils are equal, round, and reactive to light.   Cardiovascular:      Rate and Rhythm: Normal rate and regular rhythm.   Pulmonary:      Effort: Pulmonary effort is normal.      Breath sounds: Normal breath sounds.   Abdominal:      General: Abdomen is flat.      Palpations: Abdomen is soft.   Musculoskeletal:         General: Normal range of motion.      Cervical back: Normal range of motion and neck supple.   Skin:     General: Skin is warm and dry.   Neurological:      General: No focal deficit present.      Mental Status: She is alert and oriented to person, place, and time.   Psychiatric:         Mood and Affect: Mood normal.         Behavior: Behavior normal.          Airway    Testing/Diagnostic:     Patient Specialist/PCP:     Visit Vitals  /61   Pulse 77   Temp 36.1 °C (97 °F) (Temporal)   Resp 18   Ht 1.524 m (5')   Wt 56 kg (123 lb 7.3 oz)   LMP  (LMP Unknown)   SpO2 98%   BMI 24.11 kg/m²   OB Status  Postmenopausal   Smoking Status Never   BSA 1.54 m²       DASI Risk Score      Flowsheet Row Pre-Admission Testing from 12/23/2024 in [facility]   Can you take care of yourself (eat, dress, bathe, or use toilet)?  2.75 filed at 12/23/2024 1346   Can you walk indoors, such as around your house? 1.75 filed at 12/23/2024 1346   Can you walk a block or two on level ground?  2.75 filed at 12/23/2024 1346   Can you climb a flight of stairs or walk up a hill? 5.5 filed at 12/23/2024 1346   Can you run a short distance? 0 filed at 12/23/2024 1346   Can you do light work around the house like dusting or washing dishes? 2.7 filed at 12/23/2024 1346   Can you do moderate work around the house like vacuuming, sweeping floors or carrying groceries? 3.5 filed at 12/23/2024 1346   Can you do heavy work around the house like scrubbing floors or lifting and moving heavy furniture?  8 filed at 12/23/2024 1346   Can you do yard work like raking leaves, weeding or pushing a mower? 4.5 filed at 12/23/2024 1346   Can you have sexual relations? 5.25 filed at 12/23/2024 1346   Can you participate in moderate recreational activities like golf, bowling, dancing, doubles tennis or throwing a baseball or football? 6 filed at 12/23/2024 1346   Can you participate in strenous sports like swimming, singles tennis, football, basketball, or skiing? 0 filed at 12/23/2024 1346   DASI SCORE 42.7 filed at 12/23/2024 1346   METS Score (Will be calculated only when all the questions are answered) 8 filed at 12/23/2024 1346          Caprini DVT Assessment      Flowsheet Row Pre-Admission Testing from 12/23/2024 in [facility]   DVT Score 14 filed at 12/23/2024 1402   Surgical Factors Major surgery planned, including arthroscopic and laproscopic (1-2 hours), Elective major lower extremity arthroplasty filed at 12/23/2024 1402   Labs/Test Results Positive Lupus Anticoagulant filed at 12/23/2024 1402   BMI 30 or less filed at  12/23/2024 1402          Modified Frailty Index    No data to display       CHADS2 Stroke Risk  Current as of 41 minutes ago        N/A 3 to 100%: High Risk   2 to < 3%: Medium Risk   0 to < 2%: Low Risk     Last Change: N/A          This score determines the patient's risk of having a stroke if the patient has atrial fibrillation.        This score is not applicable to this patient. Components are not calculated.          Revised Cardiac Risk Index      Flowsheet Row Pre-Admission Testing from 12/23/2024 in [facility]   High-Risk Surgery (Intraperitoneal, Intrathoracic,Suprainguinal vascular) 0 filed at 12/24/2024 0836   History of ischemic heart disease (History of MI, History of positive exercuse test, Current chest paint considered due to myocardial ischemia, Use of nitrate therapy, ECG with pathological Q Waves) 0 filed at 12/24/2024 0836   History of congestive heart failure (pulmonary edemia, bilateral rales or S3 gallop, Paroxysmal nocturnal dyspnea, CXR showing pulmonary vascular redistribution) 0 filed at 12/24/2024 0836   History of cerebrovascular disease (Prior TIA or stroke) 1 filed at 12/24/2024 0836   Pre-operative insulin treatment 0 filed at 12/24/2024 0836   Pre-operative creatinine>2 mg/dl 0 filed at 12/24/2024 0836   Revised Cardiac Risk Calculator 1 filed at 12/24/2024 0836          Apfel Simplified Score      Flowsheet Row Pre-Admission Testing from 12/23/2024 in [facility]   Smoking status 1 filed at 12/24/2024 0837   History of motion sickness or PONV  0 filed at 12/24/2024 0837   Use of postoperative opioids 1 filed at 12/24/2024 0837   Gender - Female 1=Yes filed at 12/24/2024 0837   Apfel Simplified Score Calculator 3 filed at 12/24/2024 0837          Risk Analysis Index Results This Encounter         12/23/2024  [id number]             Do you live in a place other than your own home?: 1    Any kidney failure, kidney not working well, or seeing a kidney doctor  (nephrologist)? If yes, was this for kidney stones or another problem?: 8 Other (Comment)      ckd    Any history of chronic (long-term) congestive heart failure (CHF)?: 0 No    Any shortness of breath when resting?: 0 No    In the past five years, have you been diagnosed with or treated for cancer?: No    During the last 3 months has it become difficult for you to remember things or organize your thoughts?: 0 No    Have you lost weight of 10 pounds or more in the past 3 months without trying?: 0 No    Do you have any loss of appetitie?: 0 No    Getting Around (Mobility): 0 Can get around without help    Eatin Can plan and prepare own meals    Toiletin Can use toilet without any help    Personal Hygiene (Bathing, Hand Washing, Changing Clothes): 0 Can shower or bathe without any help    VIDAL Cancer History: Patient does not indicate history of cancer    Total Risk Analysis Index Score Without Cancer: 33    Total Risk Analysis Index Score: 33          Stop Bang Score      Flowsheet Row Pre-Admission Testing from 2024 in [facility]   Do you snore loudly? 0 filed at 2024 1346   Do you often feel tired or fatigued after your sleep? 0 filed at 2024 1346   Has anyone ever observed you stop breathing in your sleep? 0 filed at 2024 1346   Do you have or are you being treated for high blood pressure? 1 filed at 2024 1346   Recent BMI (Calculated) 24.8 filed at 2024 1346   Is BMI greater than 35 kg/m2? 0=No filed at 2024 1346   Age older than 50 years old? 1=Yes filed at 2024 1346   Is your neck circumference greater than 17 inches (Male) or 16 inches (Female)? 0 filed at 2024 1346   Gender - Male 0=No filed at 2024 1346   STOP-BANG Total Score 2 filed at 2024 1346          Prodigy: High Risk  Total Score: 12              Prodigy Age Score           ARISCAT Score for Postoperative Pulmonary Complications    No data to display       Alfred  Perioperative Risk for Myocardial Infarction or Cardiac Arrest (BALAJI)    No data to display             Assessment and Plan:       HPI 78 y/o female scheduled for open total knee arthroplasty-right on 1/13/2025 with  Dr. Forrester secondary to OA of right knee.  PMHX includes HTN, CKD III, IBS, SLE, GERD RA, chondrocalcinosis (right wrist), pseudogout .  PAT is consulted today for perioperative risk stratification and optimization.        Neuro:  No neurologic diagnosis, however, the patient is at increased risk for perioperative delirium secondary to  age, polypharmacy  Patient is at increased risk for perioperative CVA secondary to  previous CVA/TIA, HTN, increased age    HEENT:  No HEENT diagnosis or significant findings on chart review or clinical presentation and evaluation. No further preoperative testing/intervention indicated at this time.    Cardiovascular:  HTN - Managed by PCP   Continue Amlodipine  Holding Lisinopril day of procedure  METS: 8  RCRI: 1 point, 6.0% risk for postoperative MACE       Pulmonary:  No pulmonary diagnosis, however patient is at increased risk of perioperative complications secondary to  age > 60  Stop Bang score is 2 placing patient at low risk for [place]  PRODIGY: High risk for opioid induced respiratory depression  Pumonary education discussed, patient also provided deep breathing exerciseswith educational handout    Renal:   CKD III - Managed by PCP  Cr: 1.65-1.88  PCP encouraged increased fluid intake    Endocrine:  Follows with Rheumatology, Dr. Ricks.  Last seen on 11/7/2024.  Long term use of Plaquenil - will continue    Hematologic:  No hematologic diagnosis, however patient is at an increased risk for DVT  Caprini Score 17, patient at High risk for perioperative DVT.  Patient provided with VTE education/handout.    Gastrointestinal:   No GI diagnosis or significant findings on chart review or clinical presentation and evaluation.   Apfel 3    Infectious disease:   No  infectious diagnosis or significant findings on chart review or clinical presentation and evaluation.   Prescription provided for CHG body wash and dental rinse. CHG use instructions reviewed and provided to patient.  Staph screen collected    Musculoskeletal:   No diagnosis or significant findings on chart review or clinical presentation and evaluation.     Anesthesia/Airway:  No anesthesia complications      Medication instructions and NPO guidelines reviewed with the patient.  All questions or concerns discussed and addressed.      Holding all Multivitamins 1 week prior to procedure    Labs  and EKG ordered

## 2024-12-24 LAB — VIT B12 SERPL-MCNC: 239 PG/ML (ref 211–911)

## 2024-12-24 RX ORDER — FERROUS SULFATE 325(65) MG
325 TABLET ORAL 3 TIMES WEEKLY
Qty: 36 TABLET | Refills: 3 | Status: SHIPPED | OUTPATIENT
Start: 2024-12-25 | End: 2025-12-25

## 2024-12-24 ASSESSMENT — LIFESTYLE VARIABLES: SMOKING_STATUS: NONSMOKER

## 2024-12-25 LAB — STAPHYLOCOCCUS SPEC CULT: ABNORMAL

## 2025-01-03 ENCOUNTER — TELEPHONE (OUTPATIENT)
Dept: PRIMARY CARE | Facility: CLINIC | Age: 80
End: 2025-01-03
Payer: MEDICARE

## 2025-01-05 DIAGNOSIS — I10 BENIGN ESSENTIAL HYPERTENSION: ICD-10-CM

## 2025-01-05 DIAGNOSIS — N18.30 STAGE 3 CHRONIC KIDNEY DISEASE, UNSPECIFIED WHETHER STAGE 3A OR 3B CKD (MULTI): Primary | ICD-10-CM

## 2025-01-06 RX ORDER — LISINOPRIL 20 MG/1
20 TABLET ORAL 2 TIMES DAILY
Qty: 180 TABLET | Refills: 0 | Status: SHIPPED | OUTPATIENT
Start: 2025-01-06 | End: 2025-01-08 | Stop reason: SDUPTHER

## 2025-01-06 RX ORDER — LISINOPRIL 20 MG/1
20 TABLET ORAL 2 TIMES DAILY
Qty: 180 TABLET | Refills: 0 | OUTPATIENT
Start: 2025-01-06

## 2025-01-07 ENCOUNTER — ANESTHESIA EVENT (OUTPATIENT)
Dept: OPERATING ROOM | Facility: HOSPITAL | Age: 80
End: 2025-01-07
Payer: MEDICARE

## 2025-01-08 ENCOUNTER — APPOINTMENT (OUTPATIENT)
Dept: PRIMARY CARE | Facility: CLINIC | Age: 80
End: 2025-01-08
Payer: MEDICARE

## 2025-01-08 VITALS — HEIGHT: 60 IN | BODY MASS INDEX: 25.13 KG/M2 | OXYGEN SATURATION: 98 % | WEIGHT: 128 LBS | HEART RATE: 77 BPM

## 2025-01-08 DIAGNOSIS — N18.30 STAGE 3 CHRONIC KIDNEY DISEASE, UNSPECIFIED WHETHER STAGE 3A OR 3B CKD (MULTI): ICD-10-CM

## 2025-01-08 DIAGNOSIS — J44.1 CHRONIC OBSTRUCTIVE PULMONARY DISEASE WITH ACUTE EXACERBATION (MULTI): ICD-10-CM

## 2025-01-08 DIAGNOSIS — K50.011 CROHN'S DISEASE OF SMALL INTESTINE WITH RECTAL BLEEDING (MULTI): ICD-10-CM

## 2025-01-08 DIAGNOSIS — Z00.00 ROUTINE GENERAL MEDICAL EXAMINATION AT HEALTH CARE FACILITY: Primary | ICD-10-CM

## 2025-01-08 DIAGNOSIS — I10 BENIGN ESSENTIAL HYPERTENSION: ICD-10-CM

## 2025-01-08 DIAGNOSIS — M32.9 SYSTEMIC LUPUS ERYTHEMATOSUS, UNSPECIFIED SLE TYPE, UNSPECIFIED ORGAN INVOLVEMENT STATUS (MULTI): ICD-10-CM

## 2025-01-08 PROCEDURE — G0439 PPPS, SUBSEQ VISIT: HCPCS

## 2025-01-08 PROCEDURE — 1157F ADVNC CARE PLAN IN RCRD: CPT

## 2025-01-08 PROCEDURE — 1160F RVW MEDS BY RX/DR IN RCRD: CPT

## 2025-01-08 PROCEDURE — 1159F MED LIST DOCD IN RCRD: CPT

## 2025-01-08 PROCEDURE — 1036F TOBACCO NON-USER: CPT

## 2025-01-08 PROCEDURE — 99397 PER PM REEVAL EST PAT 65+ YR: CPT

## 2025-01-08 PROCEDURE — 1170F FXNL STATUS ASSESSED: CPT

## 2025-01-08 RX ORDER — LISINOPRIL 20 MG/1
20 TABLET ORAL 2 TIMES DAILY
Qty: 180 TABLET | Refills: 0 | Status: SHIPPED | OUTPATIENT
Start: 2025-01-08 | End: 2025-04-08

## 2025-01-08 ASSESSMENT — ENCOUNTER SYMPTOMS
CONSTIPATION: 0
SORE THROAT: 0
COUGH: 0
ORTHOPNEA: 0
FATIGUE: 0
SEIZURES: 0
ABDOMINAL PAIN: 0
SINUS PRESSURE: 0
SHORTNESS OF BREATH: 1
ARTHRALGIAS: 1
WOUND: 0
PSYCHIATRIC NEGATIVE: 1
LIGHT-HEADEDNESS: 0
FEVER: 0
RHINORRHEA: 0
TROUBLE SWALLOWING: 0
MYALGIAS: 1
PALPITATIONS: 0
HYPERTENSION: 1
NAUSEA: 0
UNEXPECTED WEIGHT CHANGE: 0
WHEEZING: 0
BACK PAIN: 0
JOINT SWELLING: 1
SINUS PAIN: 0
HEMATURIA: 0
WEAKNESS: 0
DYSURIA: 0
FACIAL ASYMMETRY: 0

## 2025-01-08 ASSESSMENT — ACTIVITIES OF DAILY LIVING (ADL)
DOING_HOUSEWORK: INDEPENDENT
GROCERY_SHOPPING: INDEPENDENT
MANAGING_FINANCES: INDEPENDENT
DRESSING: INDEPENDENT
TAKING_MEDICATION: INDEPENDENT
BATHING: INDEPENDENT

## 2025-01-08 NOTE — PROGRESS NOTES
Subjective   Reason for Visit: Vicky Govea is an 80 y.o. female here for a Medicare Wellness visit.     Past Medical, Surgical, and Family History reviewed and updated in chart.    Reviewed all medications by prescribing practitioner or clinical pharmacist (such as prescriptions, OTCs, herbal therapies and supplements) and documented in the medical record.    Pt is here for annual wellness.  Reports overall health is good but for bp    Do you take any herbs or supplements that were not prescribed by a doctor? no  Are you taking calcium supplements? no  Are you taking aspirin daily? no  Colonoscopy: n/a  Fasting blood work: uptodate  Last eye exam: follows [facility]  Last dental Exam: every 6 months  Exercise:water class bid, pickle ball 3xper week  Mood:pleasant  Sleep: not good mostly because of pain and needing to pe  Diet:  okay  Occupation:  [employer] ball, involved with granddaughter  Do you have pain that bothers you in your daily life? yes    1. Patient Counseling:  --Nutrition: Stressed importance of moderation in sodium/caffeine intake, saturated fat and cholesterol, caloric balance, sufficient intake of fresh fruits, vegetables, fiber, calcium, iron, and 1 mg of folate supplement per day (for females capable of pregnancy).  --Discussed the issue of estrogen replacement, calcium supplement, and the daily use of baby aspirin as appropriate per pt.  --Exercise: Stressed the importance of regular exercise.   --Substance Abuse: Discussed cessation/primary prevention of tobacco, alcohol, or other drug use; driving or other dangerous activities under the influence; availability of treatment for abuse.    --Sexuality: Discussed sexually transmitted diseases, partner selection, use of condoms, avoidance of unintended pregnancy  and contraceptive alternatives.   --Injury prevention: Discussed safety belts, safety helmets, smoke detector, smoking near bedding or upholstery.   --Dental health: Discussed importance of  regular tooth brushing, flossing, and dental visits.  --Immunizations reviewed.  --Discussed benefits of colon cancer screening.  --After hours service discussed with patient  2 Discussed the patient's BMI.  The BMI is in the acceptable range.  3 Follow up as needed for acute illness  Follows with  Urology-joe changing to [facility]  Rheumatology-[facility]  Pain-[facility]  Ortho[facility]  [facility]  Gyn-[facility]  Nephrology-April-with eldaway        Hypertension  This is a chronic problem. The current episode started more than 1 year ago. The problem is unchanged (reviewed and scanned into record pts home bp chart). The problem is controlled. Associated symptoms include shortness of breath. Pertinent negatives include no anxiety, chest pain, orthopnea, palpitations or peripheral edema.       Patient Care Team:  JOHN Torres as PCP - General (Family Medicine)  Jessica Prado DO as PCP - O Medicare Advantage PCP  JOHN Melara as Primary Care Provider     Review of Systems   Constitutional:  Negative for fatigue, fever and unexpected weight change.   HENT:  Negative for congestion, ear discharge, ear pain, nosebleeds, postnasal drip, rhinorrhea, sinus pressure, sinus pain, sneezing, sore throat and trouble swallowing.    Respiratory:  Positive for shortness of breath. Negative for cough and wheezing.    Cardiovascular:  Negative for chest pain, palpitations, orthopnea and leg swelling.   Gastrointestinal:  Negative for abdominal pain, constipation and nausea.   Genitourinary:  Negative for dysuria, hematuria, menstrual problem, pelvic pain, vaginal bleeding and vaginal pain.   Musculoskeletal:  Positive for arthralgias, gait problem, joint swelling and myalgias. Negative for back pain.   Skin:  Negative for rash and wound.   Neurological:  Negative for seizures, facial asymmetry, weakness and light-headedness.   Psychiatric/Behavioral: Negative.         Objective   Vitals:  Pulse 77    Ht 1.524 m (5')   Wt 58.1 kg (128 lb)   LMP  (LMP Unknown)   SpO2 98%   BMI 25.00 kg/m²       Physical Exam  Constitutional:       Appearance: Normal appearance.   HENT:      Head: Normocephalic and atraumatic.      Right Ear: Tympanic membrane and external ear normal.      Left Ear: Tympanic membrane and external ear normal.      Nose: Nose normal.      Mouth/Throat:      Mouth: Mucous membranes are moist.      Pharynx: Oropharynx is clear. Uvula midline.   Eyes:      General: Lids are normal.      Extraocular Movements: Extraocular movements intact.      Pupils: Pupils are equal, round, and reactive to light.   Neck:      Thyroid: No thyromegaly or thyroid tenderness.   Cardiovascular:      Rate and Rhythm: Normal rate and regular rhythm.      Heart sounds: Normal heart sounds.   Pulmonary:      Effort: Pulmonary effort is normal.      Breath sounds: Normal breath sounds.   Abdominal:      General: Bowel sounds are normal.      Palpations: Abdomen is soft.      Tenderness: There is no abdominal tenderness. There is no guarding.   Musculoskeletal:         General: Tenderness present. No swelling. Normal range of motion.      Cervical back: Normal range of motion.      Right lower leg: No edema.      Left lower leg: No edema.   Lymphadenopathy:      Head:      Right side of head: No submental, submandibular or tonsillar adenopathy.      Left side of head: No submental, submandibular or tonsillar adenopathy.      Cervical: No cervical adenopathy.   Skin:     General: Skin is warm and dry.      Capillary Refill: Capillary refill takes less than 2 seconds.      Coloration: Skin is not jaundiced.      Findings: No lesion or rash.   Neurological:      General: No focal deficit present.      Mental Status: She is alert and oriented to person, place, and time.   Psychiatric:         Mood and Affect: Mood normal.         Behavior: Behavior normal.         Thought Content: Thought content normal.         Judgment:  Judgment normal.         Assessment & Plan  Benign essential hypertension    Orders:    lisinopril 20 mg tablet; Take 1 tablet (20 mg) by mouth 2 times a day.    Routine general medical examination at health care facility    Orders:    1 Year Follow Up In Primary Care - Wellness Exam; Future    Crohn's disease of small intestine with rectal bleeding (Multi)         Chronic obstructive pulmonary disease with acute exacerbation (Multi)         Stage 3 chronic kidney disease, unspecified whether stage 3a or 3b CKD (Multi)         Systemic lupus erythematosus, unspecified SLE type, unspecified organ involvement status (Multi)          Vicky was seen today for medicare annual wellness visit subsequent.  Diagnoses and all orders for this visit:  Routine general medical examination at health care facility  -     1 Year Follow Up In Primary Care - Wellness Exam; Future  Benign essential hypertension  -     lisinopril 20 mg tablet; Take 1 tablet (20 mg) by mouth 2 times a day.  Crohn's disease of small intestine with rectal bleeding (Multi)  Comments:  follows with gastro  Chronic obstructive pulmonary disease with acute exacerbation (Multi)  Comments:  well controlled no long term inhaler use will self monitor  Stage 3 chronic kidney disease, unspecified whether stage 3a or 3b CKD (Multi)  Comments:  will following with nephrology in april  Systemic lupus erythematosus, unspecified SLE type, unspecified organ involvement status (Multi)  Comments:  follows with joy  Other orders  -     Follow Up In Primary Care - Medicare Annual    RTC 6-8 weeks after her knee replacement on Monday for post-op follow-up

## 2025-01-09 ENCOUNTER — TELEMEDICINE (OUTPATIENT)
Dept: PHARMACY | Facility: HOSPITAL | Age: 80
End: 2025-01-09
Payer: MEDICARE

## 2025-01-09 DIAGNOSIS — N18.30 STAGE 3 CHRONIC KIDNEY DISEASE, UNSPECIFIED WHETHER STAGE 3A OR 3B CKD (MULTI): ICD-10-CM

## 2025-01-09 NOTE — PROGRESS NOTES
Outpatient Clinical Pharmacy Visit  Vicky Govea is a 80 y.o. female who was referred to the ambulatory Clinical Pharmacy Team for their Chronic Kidney Disease.    Referring Provider: NATE Torres-CNP    Allergies  Allergies   Allergen Reactions    Benzocaine Other    Aspirin Other     PATIENT STATES SHE CANT TAKE IT BC SHE IS ON A B LOOD THINNER NOT BC SHE HAS AN ALLERGY    Nsaids (Non-Steroidal Anti-Inflammatory Drug) Other     Pt stated she should not take while on plavix    Simvastatin Myalgia    Tramadol Other and Rash       Preferred Pharmacy  [id number] - [place], [place] - [address]  [address] [facility] [address]  Phone: [phone] Fax: [phone]    [facility] - [place], [place] - [address]  [address] [facility] [address]  Phone: [phone] Fax: [phone]    [facility]  [address] [address]  Phone: [phone] Fax: [phone]    [facility]  [address], [address]  [facility] [address]  Phone: [phone] Fax: [phone]    Medication Access  Cost barriers: Yes- cost of Jardiance  Enrolled in  PAP: Not currently  Manages own medication: Yes  Transportation to the pharmacy: Yes  Frequency of missed doses: Not discussed due to time    CKD ASSESSMENT  Believes it has been approximately 10 years since she began to see some level of kidney decline.     Renal Function  eGFR: 27 mL/min/1.73 m2 as of 12/23/24  CrCl: 19.1 mL/min (calculated) as of 12/23/24 labs  Urine albumin/creatinine ratio: Unable to calculate as of 11/21/24  CKD Stage: Stage 4  Medications  SGLT2i? No  Kerendia? No    Renal Dosing  Are all medications dosed appropriately? Yes    Hypertension  BP: 120s/50s-60s  Medications:   Amlodipine 10 mg daily  Lisinopril 20 mg daily    Diabetes  A1C: 5.4%  Medications:  None    Hyperlipidemia  LDL: 101 mg/dL as of 10/10/24  Triglycerides 153  On statin? Yes, describe:  atorvastatin 10 mg daily- started ~3 years ago  Other medications: No     PAP Eligibility Assessment  This patient has expressed a need for medication cost assistance and will be considered for the  Patient Assistance Program ( PAP). If patient is approved, the below medications would result in a $0 cost/month for the patient. If approved, patient must then provide updated financial documents each calendar year and have a new prescription issued by a  Meds pharmacist to continue receiving medications at $0 cost.    Medications  Jardiance    Eligibility Requirements  [x] Patient has pre-existing prescription coverage  [x] Medication(s) above are listed on  PAP formulary  [x] Income: Less than or equal to 400% Federal Poverty Limit  Household Persons: 2  [x] Patient's prescription insurance is contracted with either [facility] or  Specialty Pharmacy    2024* Poverty Guidelines for the [address] [place] [address] and [facility]   Persons in Family/Household Poverty Guideline Annual 400% Monthly 400%   1 $15,060 $60,240 $5,020   2 $20,440 $81,760 $6,813   3 $25,820 $103,280 $8,607   4 $31,200 $124,800 $10,400   5 $36,580 $146,320 $12,193   6 $41,960 $167,840 $13,987   7 $47,340 $189,360 $15,780   8 $52,720 $210,880 $17,573   *[facility] updates FPL guidelines in late January of each calendar year.      Initial Eligibility Determination  Note: Clinical Pharmacy is not responsible for finalizing eligibility approval. The  PAP team determines final eligibility.  This patient will likely qualify for the  PAP program.    Financial Documents Required for Application Submission  [] 1040 Tax Form     Laboratory Results  Lab Results   Component Value Date    BILITOT 0.4 12/23/2024    CALCIUM 9.5 12/23/2024    CALCIUM 9.6 12/23/2024    CO2 27 12/23/2024    CO2 27 12/23/2024     12/23/2024     12/23/2024    CREATININE 1.88 (H) 12/23/2024    CREATININE 1.83 (H) 12/23/2024    GLUCOSE 129 (H) 12/23/2024     GLUCOSE 126 (H) 12/23/2024    ALKPHOS 56 12/23/2024    K 4.2 12/23/2024    K 4.3 12/23/2024    PROT 6.6 12/23/2024     12/23/2024     12/23/2024    AST 16 12/23/2024    ALT 10 12/23/2024    BUN 45 (H) 12/23/2024    BUN 44 (H) 12/23/2024    ANIONGAP 13 12/23/2024    ANIONGAP 12 12/23/2024    MG 1.60 05/06/2024    PHOS 3.8 12/23/2024    ALBUMIN 4.3 12/23/2024    ALBUMIN 4.3 12/23/2024    GFRF 27 (A) 08/10/2023    GFRMALE CANCELED 05/10/2023    EGFR 27 (L) 12/23/2024    EGFR 28 (L) 12/23/2024     Lab Results   Component Value Date    TRIG 153 (H) 10/10/2024    CHOL 185 10/10/2024    HDL 53.4 10/10/2024     Lab Results   Component Value Date    HGBA1C 5.4 10/10/2024       Assessment/Plan   Problem List Items Addressed This Visit       Stage 3 chronic kidney disease (Multi)     General Patient Discussion  Patient presents today for evaluation of starting Jardiance/cost assistance evaluation.  CKD  Patient's GFR is clinically 'borderline' as it relates to starting Jardiance, but is still above GFR of 25 per most recent results 12/23/24, so initiating Jardiance is still permissible.   Used to have recurrent UTI/yeast infections, however, no longer recurrent.   Jardiance Education:   Counseled patient on Jardiance MOA, expectations, side effects, duration of therapy, administration, and monitoring parameters.  Reviewed the benefits of SGLT-2i therapy, such as glycemic control, kidney protection, and cardiovascular protection.  Advised patient to practice proper  hygiene to reduce risk of UTIs or yeast infections.  Advised patient to maintain adequate fluid intake to remain hydrated while on SGLT-2i therapy. If patient becomes acutely ill, characterized by decreased oral intake and/or increased volume loss due to diarrhea/vomiting, patient advised to abstain from Jardiance use until they have returned to baseline.   Answered all patient questions and concerns.     Plan/Changes   Continue all meds under the  continuation of care with the referring provider and clinical pharmacy team  Start Jardiance 10 mg daily, send to [facility] for  PAP, cost assistance, and mail-order  Start  Patient Assistance Program application    Next PCP Follow-Up  TBD- patient has determined she would like to continue seeing Cierra Dodd CNP as her PCP, despite later visit with Sheyla Reyes CNP.     Next Clinical Pharmacy Follow-Up  TBD      Shanda Martinez PharmD     Verbal consent to manage patient's drug therapy was obtained from the patient. They were informed they may decline to participate or withdraw from participation in pharmacy services at any time.

## 2025-01-10 ENCOUNTER — TELEPHONE (OUTPATIENT)
Dept: INPATIENT UNIT | Facility: HOSPITAL | Age: 80
End: 2025-01-10

## 2025-01-10 ENCOUNTER — HOME HEALTH ADMISSION (OUTPATIENT)
Dept: HOME HEALTH SERVICES | Facility: HOME HEALTH | Age: 80
End: 2025-01-10
Payer: MEDICARE

## 2025-01-10 ENCOUNTER — APPOINTMENT (OUTPATIENT)
Dept: PHARMACY | Facility: HOSPITAL | Age: 80
End: 2025-01-10
Payer: MEDICARE

## 2025-01-10 PROCEDURE — RXMED WILLOW AMBULATORY MEDICATION CHARGE

## 2025-01-10 RX ORDER — HYDROCODONE BITARTRATE AND ACETAMINOPHEN 5; 325 MG/1; MG/1
2 TABLET ORAL EVERY 8 HOURS PRN
Qty: 42 TABLET | Refills: 0 | Status: SHIPPED | OUTPATIENT
Start: 2025-01-10 | End: 2025-01-20

## 2025-01-10 RX ORDER — CEFAZOLIN SODIUM 2 G/50ML
2 SOLUTION INTRAVENOUS ONCE
Status: CANCELLED | OUTPATIENT
Start: 2025-01-10 | End: 2025-01-10

## 2025-01-10 RX ORDER — AMOXICILLIN 250 MG
1 CAPSULE ORAL 2 TIMES DAILY
Qty: 28 TABLET | Refills: 0 | Status: SHIPPED | OUTPATIENT
Start: 2025-01-10 | End: 2025-01-27

## 2025-01-10 NOTE — DISCHARGE INSTRUCTIONS
Post op appointment 1/28/25 at 1015 am at the [facility]                            JIM Gusman, RN-BC Orthopedic   for [facility] - Phone: [phone]    Gurinder Forrester D.O.  Phone: [phone]    POSTOPERATIVE INSTRUCTIONS: TOTAL HIP & TOTAL KNEE ARTHROPLASTY    General/highlights: Flex/tighten the muscles in the buttocks, thighs and calves often when in chair or bed.  Utilize the incentive spirometer often especially the next 3 days.  Walk at least 10 steps every hour that you are awake.  Take stairs 1 at a time, good leg leads up, bed leg legs down, use the handrails.  You may be weightbearing as tolerated, in general the walker is used for 2 weeks.    PAIN, SWELLING & BRUISING  Some pain, stiffness and swelling is normal for up to 1 year after surgery.  Pain will start to let up over time depending on your activity level.  It is preferable to rest for 24 hours following your surgery  Pain is often delayed for 24-48 hours after surgery.  Pain may be dull/achy, throbbing, or even sharp/nerve sensations  It is normal for swelling and bruising to worsen before it gets better.  These symptoms usually peak 1 week after surgery  Swelling and bruising may appear throughout the leg, all the way down to your toes  Wear your compression stockings every day during the day for 14 days and remove them at night.  This will help control swelling    WOUND CARE INSTRUCTIONS  Your surgical bandage will be removed 2 weeks after surgery at your post-operative visit.  If your bandage becomes compromised, begins to come off before then, or soaks through with drainage call the office immediately.  You may have sutures under the skin that dissolve on their own over time.    As the sutures absorb occasionally a small suture abscess can develop, this is not uncommon for up to 6 weeks, if this occurs please notify your  surgeon immediately.    HYGIENE  You may shower 48 hours after your surgery, provided the Mepilex silver dressing is in place.  No tub bathing or submerging underwater.  Do not scrub directly over the surgical bandage  Do not use any creams, lotions or ointments on the surgical leg for 4 weeks after surgery, or until you have been cleared to do so by your surgeon    GENERAL INSTRUCTIONS  Do not drink alcoholic beverages (beer and wine included) for 24 hours following your surgery, or while you are taking narcotic pain medications  Delay making important decisions until you are fully recovered  You cannot swim or submerge in water for at least 6 weeks after surgery, or until you are cleared by your surgeon.  You may start kneeling 3 months after knee replacement surgery, once you have been cleared by your surgeon.  This may not ever feel “normal” or comfortable    HOME DIET  Resume your normal diet after surgery. If you are on a specific type of diet for your condition, resume that instead.    Choose foods that help promote good bowel habits and prevent constipation, such as foods high in fiber.          POSTOPERATIVE MEDICATIONS    Pain medications have been ordered to help manage pain throughout recovery.  While you are using narcotic pain medication, you should be using a stool softener or laxative to prevent constipation.  My preference is parallax powder once or twice a day when taking the narcotic pain medicine  It is important to eat a small meal or snack before taking pain medications to avoid nausea or stomach upset.    MEDICATION REFILLS - [phone]    If you need to request a medication refill, please call the office between 8:30am-4:30pm, Monday through Friday.    Any calls received outside of this timeframe will be handled on the next business day.    Medication requests received on Saturday or Sunday will be handled on Monday.    Please allow 3-5 business days for all medication requests to be  "processed.    RESTARTING HOME MEDICATIONS  You may restart your home medications the following day after your surgery UNLESS you have been given alternate instructions.    Follow the instructions given to you on your hospital discharge instructions for more information regarding your home medications.    ASSISTIVE DEVICE & MOVEMENT  Initially, you will use a walker or crutches to walk for the first 1-2 weeks.  Once your therapist feels you are ready, you will wean to one crutch or cane followed by no assistive device.  It is important to keep moving throughout recovery.  Walk at least 10 steps every hour that you are awake.  Stairs are part of your recovery, the \"good \"leg leads on the way up, the \"bad \"leg leads on the way down to, use the handrails and not the walker or crutches.  You should be up and walking around several times per day as well as bending your knee and making sure your knee is going completely straight (for knee replacements).  For anterior hip replacements avoid straight leg raise.    NUMBNESS/CLICKING    Decreased sensation or numbness is common on or around the area of the incision due to sensory nerves that are affected at the time of surgery.  The area of numbness will be lateral to the incision  You might always have numbness but the size of the area of numbness should decrease with time.  It is common for patients to have a “click” in the knee with movement.  This is usually nothing to be concerned about.  There are several reasons why your knee can be making these noises, including the implant components rubbing against each other, or a tendons going over a bony prominence.  Grinding can also occur and is not out of the ordinary.  Grinding can be caused by scar tissue formation  Noises will often settle over time once muscle strength improves.    DIFFICULTY SLEEPING    It is very common for patients to have difficulty sleeping at night which can be caused pain, medication, or feeling of " anxiety.  Sleep disturbance typically worsens 4-6 weeks after surgery.  You are permitted to sleep on your back or side with a  pillow between your legs for comfort            DRIVING & TRAVEL  Your surgeon will address this at your post-op appointment.  You must not be taking narcotic pain medication to be cleared to drive. (Usually at least 2 weeks for RIGHT limb and 3 weeks for LEFT limb before driving is permitted)  LEFT LEG JOINT REPLACMENT:  You may drive once you have regained full control of your leg, typically around 2 week after surgery  RIGHT LEG JOINT REPLACEMENT:  You must speak with your surgeon before you resume driving.  Driving can typically be resumed by 4-6 weeks after surgery.  During long distance travel, you should attempt to change position or stand every hour.  You should complete ankle pumps throughout your travel if you are sitting for long periods of time.  If traveling within the first 2 weeks after surgery, you should wear your compression stockings.    DENTAL & OTHER PROCEDURES    All patients must wait a minimum of 3 months for elective procedures, including routine dental cleanings.  For any dental appointment - cleaning or dental procedures - patients must take a prophylactic antibiotic 1 hour before the appointment.  You will also need to call for an antibiotic prior to any other invasive test, procedure, or surgery.  These prophylactic antibiotics will be needed for the rest of your life, in order to prevent infections.  Please call your surgeons office at [phone] to request the antibiotic.      PHYSICAL THERAPY    Following surgery it is important to progress through recovery with in-home or outpatient physical therapy.  You should continue to complete home exercises provided from the hospital on days that you are not working with a physical therapist.    It is common to have a temporary increase in pain and swelling upon starting outpatient physical therapy and/or changing  your exercise routine.  Continue to use ice to help with symptoms.     FOLLOW-UP APPOINTMENT - [phone]    Your post-surgical appointments will take place approximately 2 weeks, 6 weeks, 3 months and 1 year following surgery.  Joint replacements are monitored thereafter every 2-5 years for life.  If you have any questions or need to make changes to this appointment, please contact the office:    EMERGENCIES & WHEN TO CALL YOUR SURGEON  When to contact our office immediately:  Any falls or injury to the joint replacement  Fever >101.5 for at least 48 hours after surgery or chills.  Excessive bleeding from incision(s). A small amount of drainage is normal and expected.  Signs of infection of incision(s)-excessive drainage that is soaking through your dressing (especially if it is pus-like), redness that is spreading out from the edges of your incision, or increased warmth around the area.  Excruciating pain for which the pain medication, taken as instructed, is not helping.  Severe calf pain.  Go directly to the emergency room or call 911, if you are experiencing chest pain or difficulty breathing.              ICE/COLD THERAPY  Ice is most important during the first 2 weeks after surgery, but should be used for several weeks as needed.  Never place ice, or cold therapy devices directly on the skin.  You should always have a protective layer between your skin and the cold.  You have been prescribed to ice your total joint at a minimum of twice per hour for 20 minutes while awake during the first 6 weeks after surgery if you are using ice packs. This will help with pain control.  If you are using an ice machine, please follow ice machine instructions.  After knee replacement is extremely important to elevate the leg straight on an incline, not flat.    COLD THERAPY MACHINE RECOMMENDATIONS      Cold therapy devices can be used before and after surgery to assist in comfort and help to reduce pain and swelling.  These  devices differ from ice or ice packs whereas the mechanism circulates water through tubing and a pad to provide longer periods of cold therapy to the desired site.  While in the hospital, you can use your cold devices around the clock for optimal comfort.  We recommend using cold therapy after working with therapy or completing exercises on your own.  Once you are discharged home, there is no set schedule in which you must follow while using cold therapy.  Below are a few points to remember when using a cold therapy device:    Read the 's instructions prior to first the use.  Follow instructions for filling the cooler (water first, then ice).  Always make sure there is a layer of protection between the cold pad and your skin (Clothing, Towel, Ace Bandage, etc.)  Allow the device to circulate cold water throughout the pad prior wrapping the pad around your leg (approximately 10 minutes).  Place the pad on your leg in the desired position to meet your pain management needs and use the wraps provided to secure the pad to your body.  The purpose of this device is to use consistently throughout the day.  You do not need to need to use the 20 on, 20 off method when using an ice machine.  During waking hours, remove the cold pad every 1-2 hours to perform a skin check  Detach the pad from the cooler and ambulate at least once every hour  After removing the pad, allow at least 30 minutes before resuming cold therapy  You may wear the cold therapy device during periods of sleep including overnight    If you wake up during the night, you can check the skin at this time.  You do not need to wake up specifically to perform skin checks.  Empty the cooler and pad when device is not in use.  Follow 's instructions for cleaning your cold therapy device.     DME can assist with problems related to products purchased through the hospital  [phone] - Lani   or   [phone] - Brenda    Sample Medication  Schedule  [facility]    Medication Refills - [phone] - Monday through Friday 8:30am-4:30pm  Please allow 3-5 days for medication refill requests to be processed.

## 2025-01-13 ENCOUNTER — HOSPITAL ENCOUNTER (OUTPATIENT)
Facility: HOSPITAL | Age: 80
Setting detail: OUTPATIENT SURGERY
Discharge: HOME | End: 2025-01-13
Attending: ORTHOPAEDIC SURGERY | Admitting: ORTHOPAEDIC SURGERY
Payer: MEDICARE

## 2025-01-13 ENCOUNTER — DOCUMENTATION (OUTPATIENT)
Dept: HOME HEALTH SERVICES | Facility: HOME HEALTH | Age: 80
End: 2025-01-13
Payer: MEDICARE

## 2025-01-13 ENCOUNTER — ANESTHESIA (OUTPATIENT)
Dept: OPERATING ROOM | Facility: HOSPITAL | Age: 80
End: 2025-01-13
Payer: MEDICARE

## 2025-01-13 ENCOUNTER — APPOINTMENT (OUTPATIENT)
Dept: RADIOLOGY | Facility: HOSPITAL | Age: 80
End: 2025-01-13
Payer: MEDICARE

## 2025-01-13 ENCOUNTER — PHARMACY VISIT (OUTPATIENT)
Dept: PHARMACY | Facility: CLINIC | Age: 80
End: 2025-01-13
Payer: COMMERCIAL

## 2025-01-13 VITALS
WEIGHT: 127.87 LBS | SYSTOLIC BLOOD PRESSURE: 120 MMHG | TEMPERATURE: 96.8 F | OXYGEN SATURATION: 98 % | HEIGHT: 60 IN | RESPIRATION RATE: 16 BRPM | HEART RATE: 79 BPM | BODY MASS INDEX: 25.1 KG/M2 | DIASTOLIC BLOOD PRESSURE: 78 MMHG

## 2025-01-13 DIAGNOSIS — M17.11 PRIMARY OSTEOARTHRITIS OF RIGHT KNEE: Primary | ICD-10-CM

## 2025-01-13 PROBLEM — Z98.890 PONV (POSTOPERATIVE NAUSEA AND VOMITING): Status: ACTIVE | Noted: 2025-01-13

## 2025-01-13 PROBLEM — D64.9 ANEMIA: Status: ACTIVE | Noted: 2025-01-13

## 2025-01-13 PROBLEM — R11.2 PONV (POSTOPERATIVE NAUSEA AND VOMITING): Status: ACTIVE | Noted: 2025-01-13

## 2025-01-13 LAB
ATRIAL RATE: 71 BPM
P AXIS: 79 DEGREES
P OFFSET: 189 MS
P ONSET: 163 MS
PR INTERVAL: 114 MS
Q ONSET: 220 MS
QRS COUNT: 12 BEATS
QRS DURATION: 70 MS
QT INTERVAL: 388 MS
QTC CALCULATION(BAZETT): 421 MS
QTC FREDERICIA: 410 MS
R AXIS: 55 DEGREES
T AXIS: 69 DEGREES
T OFFSET: 414 MS
VENTRICULAR RATE: 71 BPM

## 2025-01-13 PROCEDURE — 2500000004 HC RX 250 GENERAL PHARMACY W/ HCPCS (ALT 636 FOR OP/ED): Performed by: ANESTHESIOLOGY

## 2025-01-13 PROCEDURE — 7100000009 HC PHASE TWO TIME - INITIAL BASE CHARGE: Performed by: ORTHOPAEDIC SURGERY

## 2025-01-13 PROCEDURE — 2720000007 HC OR 272 NO HCPCS: Performed by: ORTHOPAEDIC SURGERY

## 2025-01-13 PROCEDURE — 2780000003 HC OR 278 NO HCPCS: Performed by: ORTHOPAEDIC SURGERY

## 2025-01-13 PROCEDURE — 97162 PT EVAL MOD COMPLEX 30 MIN: CPT | Mod: GP

## 2025-01-13 PROCEDURE — 7100000002 HC RECOVERY ROOM TIME - EACH INCREMENTAL 1 MINUTE: Performed by: ORTHOPAEDIC SURGERY

## 2025-01-13 PROCEDURE — 2500000001 HC RX 250 WO HCPCS SELF ADMINISTERED DRUGS (ALT 637 FOR MEDICARE OP): Performed by: NURSE PRACTITIONER

## 2025-01-13 PROCEDURE — C1713 ANCHOR/SCREW BN/BN,TIS/BN: HCPCS | Performed by: ORTHOPAEDIC SURGERY

## 2025-01-13 PROCEDURE — 64447 NJX AA&/STRD FEMORAL NRV IMG: CPT | Performed by: STUDENT IN AN ORGANIZED HEALTH CARE EDUCATION/TRAINING PROGRAM

## 2025-01-13 PROCEDURE — A6213 FOAM DRG >16<=48 SQ IN W/BDR: HCPCS | Performed by: ORTHOPAEDIC SURGERY

## 2025-01-13 PROCEDURE — 3600000017 HC OR TIME - EACH INCREMENTAL 1 MINUTE - PROCEDURE LEVEL SIX: Performed by: ORTHOPAEDIC SURGERY

## 2025-01-13 PROCEDURE — P9045 ALBUMIN (HUMAN), 5%, 250 ML: HCPCS | Mod: JZ | Performed by: NURSE ANESTHETIST, CERTIFIED REGISTERED

## 2025-01-13 PROCEDURE — 2500000005 HC RX 250 GENERAL PHARMACY W/O HCPCS: Performed by: NURSE ANESTHETIST, CERTIFIED REGISTERED

## 2025-01-13 PROCEDURE — 2500000004 HC RX 250 GENERAL PHARMACY W/ HCPCS (ALT 636 FOR OP/ED): Performed by: ORTHOPAEDIC SURGERY

## 2025-01-13 PROCEDURE — 2500000004 HC RX 250 GENERAL PHARMACY W/ HCPCS (ALT 636 FOR OP/ED): Performed by: NURSE ANESTHETIST, CERTIFIED REGISTERED

## 2025-01-13 PROCEDURE — RXMED WILLOW AMBULATORY MEDICATION CHARGE

## 2025-01-13 PROCEDURE — C1776 JOINT DEVICE (IMPLANTABLE): HCPCS | Performed by: ORTHOPAEDIC SURGERY

## 2025-01-13 PROCEDURE — 3700000001 HC GENERAL ANESTHESIA TIME - INITIAL BASE CHARGE: Performed by: ORTHOPAEDIC SURGERY

## 2025-01-13 PROCEDURE — 97110 THERAPEUTIC EXERCISES: CPT | Mod: GP

## 2025-01-13 PROCEDURE — 7100000001 HC RECOVERY ROOM TIME - INITIAL BASE CHARGE: Performed by: ORTHOPAEDIC SURGERY

## 2025-01-13 PROCEDURE — 3700000002 HC GENERAL ANESTHESIA TIME - EACH INCREMENTAL 1 MINUTE: Performed by: ORTHOPAEDIC SURGERY

## 2025-01-13 PROCEDURE — 2500000005 HC RX 250 GENERAL PHARMACY W/O HCPCS: Performed by: ORTHOPAEDIC SURGERY

## 2025-01-13 PROCEDURE — 99100 ANES PT EXTEME AGE<1 YR&>70: CPT | Performed by: STUDENT IN AN ORGANIZED HEALTH CARE EDUCATION/TRAINING PROGRAM

## 2025-01-13 PROCEDURE — 7100000010 HC PHASE TWO TIME - EACH INCREMENTAL 1 MINUTE: Performed by: ORTHOPAEDIC SURGERY

## 2025-01-13 PROCEDURE — 3600000018 HC OR TIME - INITIAL BASE CHARGE - PROCEDURE LEVEL SIX: Performed by: ORTHOPAEDIC SURGERY

## 2025-01-13 PROCEDURE — A27447 PR TOTAL KNEE ARTHROPLASTY: Performed by: NURSE ANESTHETIST, CERTIFIED REGISTERED

## 2025-01-13 PROCEDURE — A27447 PR TOTAL KNEE ARTHROPLASTY: Performed by: STUDENT IN AN ORGANIZED HEALTH CARE EDUCATION/TRAINING PROGRAM

## 2025-01-13 PROCEDURE — 73560 X-RAY EXAM OF KNEE 1 OR 2: CPT | Mod: RT

## 2025-01-13 PROCEDURE — 73560 X-RAY EXAM OF KNEE 1 OR 2: CPT | Mod: RIGHT SIDE | Performed by: RADIOLOGY

## 2025-01-13 DEVICE — IMPLANTABLE DEVICE: Type: IMPLANTABLE DEVICE | Site: KNEE | Status: FUNCTIONAL

## 2025-01-13 DEVICE — DOME, PATELLA, MEDIALIZED, 32MM: Type: IMPLANTABLE DEVICE | Site: KNEE | Status: FUNCTIONAL

## 2025-01-13 DEVICE — BONE CEMENT, SMART SET, HIGH VISCOSITY, 40GM: Type: IMPLANTABLE DEVICE | Site: KNEE | Status: FUNCTIONAL

## 2025-01-13 RX ORDER — TRANEXAMIC ACID 100 MG/ML
INJECTION, SOLUTION INTRAVENOUS AS NEEDED
Status: DISCONTINUED | OUTPATIENT
Start: 2025-01-13 | End: 2025-01-13 | Stop reason: HOSPADM

## 2025-01-13 RX ORDER — ACETAMINOPHEN 325 MG/1
975 TABLET ORAL ONCE
Status: COMPLETED | OUTPATIENT
Start: 2025-01-13 | End: 2025-01-13

## 2025-01-13 RX ORDER — FENTANYL CITRATE 50 UG/ML
INJECTION, SOLUTION INTRAMUSCULAR; INTRAVENOUS AS NEEDED
Status: DISCONTINUED | OUTPATIENT
Start: 2025-01-13 | End: 2025-01-13

## 2025-01-13 RX ORDER — SODIUM CHLORIDE, SODIUM LACTATE, POTASSIUM CHLORIDE, CALCIUM CHLORIDE 600; 310; 30; 20 MG/100ML; MG/100ML; MG/100ML; MG/100ML
100 INJECTION, SOLUTION INTRAVENOUS CONTINUOUS
Status: DISCONTINUED | OUTPATIENT
Start: 2025-01-13 | End: 2025-01-13 | Stop reason: HOSPADM

## 2025-01-13 RX ORDER — OXYCODONE HYDROCHLORIDE 5 MG/1
5 TABLET ORAL EVERY 4 HOURS PRN
Status: DISCONTINUED | OUTPATIENT
Start: 2025-01-13 | End: 2025-01-13 | Stop reason: HOSPADM

## 2025-01-13 RX ORDER — NORETHINDRONE AND ETHINYL ESTRADIOL 0.5-0.035
KIT ORAL AS NEEDED
Status: DISCONTINUED | OUTPATIENT
Start: 2025-01-13 | End: 2025-01-13

## 2025-01-13 RX ORDER — DIPHENHYDRAMINE HYDROCHLORIDE 50 MG/ML
12.5 INJECTION INTRAMUSCULAR; INTRAVENOUS ONCE AS NEEDED
Status: DISCONTINUED | OUTPATIENT
Start: 2025-01-13 | End: 2025-01-13 | Stop reason: HOSPADM

## 2025-01-13 RX ORDER — MEPERIDINE HYDROCHLORIDE 25 MG/ML
12.5 INJECTION INTRAMUSCULAR; INTRAVENOUS; SUBCUTANEOUS EVERY 10 MIN PRN
Status: DISCONTINUED | OUTPATIENT
Start: 2025-01-13 | End: 2025-01-13 | Stop reason: HOSPADM

## 2025-01-13 RX ORDER — LIDOCAINE HCL/PF 100 MG/5ML
SYRINGE (ML) INTRAVENOUS AS NEEDED
Status: DISCONTINUED | OUTPATIENT
Start: 2025-01-13 | End: 2025-01-13

## 2025-01-13 RX ORDER — ONDANSETRON HYDROCHLORIDE 2 MG/ML
INJECTION, SOLUTION INTRAVENOUS AS NEEDED
Status: DISCONTINUED | OUTPATIENT
Start: 2025-01-13 | End: 2025-01-13

## 2025-01-13 RX ORDER — DROPERIDOL 2.5 MG/ML
0.62 INJECTION, SOLUTION INTRAMUSCULAR; INTRAVENOUS ONCE AS NEEDED
Status: DISCONTINUED | OUTPATIENT
Start: 2025-01-13 | End: 2025-01-13 | Stop reason: HOSPADM

## 2025-01-13 RX ORDER — SODIUM CHLORIDE, SODIUM LACTATE, POTASSIUM CHLORIDE, CALCIUM CHLORIDE 600; 310; 30; 20 MG/100ML; MG/100ML; MG/100ML; MG/100ML
20 INJECTION, SOLUTION INTRAVENOUS CONTINUOUS
Status: DISCONTINUED | OUTPATIENT
Start: 2025-01-13 | End: 2025-01-13 | Stop reason: HOSPADM

## 2025-01-13 RX ORDER — DEXMEDETOMIDINE IN 0.9 % NACL 20 MCG/5ML
SYRINGE (ML) INTRAVENOUS AS NEEDED
Status: DISCONTINUED | OUTPATIENT
Start: 2025-01-13 | End: 2025-01-13

## 2025-01-13 RX ORDER — PROPOFOL 10 MG/ML
INJECTION, EMULSION INTRAVENOUS CONTINUOUS PRN
Status: DISCONTINUED | OUTPATIENT
Start: 2025-01-13 | End: 2025-01-13

## 2025-01-13 RX ORDER — ONDANSETRON HYDROCHLORIDE 2 MG/ML
4 INJECTION, SOLUTION INTRAVENOUS ONCE AS NEEDED
Status: DISCONTINUED | OUTPATIENT
Start: 2025-01-13 | End: 2025-01-13 | Stop reason: HOSPADM

## 2025-01-13 RX ORDER — PHENYLEPHRINE HCL IN 0.9% NACL 0.4MG/10ML
SYRINGE (ML) INTRAVENOUS AS NEEDED
Status: DISCONTINUED | OUTPATIENT
Start: 2025-01-13 | End: 2025-01-13

## 2025-01-13 RX ORDER — CEFAZOLIN 1 G/1
INJECTION, POWDER, FOR SOLUTION INTRAVENOUS AS NEEDED
Status: DISCONTINUED | OUTPATIENT
Start: 2025-01-13 | End: 2025-01-13

## 2025-01-13 RX ORDER — ALBUTEROL SULFATE 0.83 MG/ML
2.5 SOLUTION RESPIRATORY (INHALATION) ONCE AS NEEDED
Status: DISCONTINUED | OUTPATIENT
Start: 2025-01-13 | End: 2025-01-13 | Stop reason: HOSPADM

## 2025-01-13 RX ORDER — ALBUMIN HUMAN 50 G/1000ML
SOLUTION INTRAVENOUS AS NEEDED
Status: DISCONTINUED | OUTPATIENT
Start: 2025-01-13 | End: 2025-01-13

## 2025-01-13 RX ORDER — BUPIVACAINE HYDROCHLORIDE 7.5 MG/ML
INJECTION, SOLUTION INTRASPINAL AS NEEDED
Status: DISCONTINUED | OUTPATIENT
Start: 2025-01-13 | End: 2025-01-13

## 2025-01-13 RX ADMIN — PROPOFOL 100 MCG/KG/MIN: 10 INJECTION, EMULSION INTRAVENOUS at 09:01

## 2025-01-13 RX ADMIN — EPHEDRINE SULFATE 5 MG: 50 INJECTION, SOLUTION INTRAVENOUS at 09:12

## 2025-01-13 RX ADMIN — BUPIVACAINE HYDROCHLORIDE IN DEXTROSE 1.5 ML: 7.5 INJECTION, SOLUTION SUBARACHNOID at 08:58

## 2025-01-13 RX ADMIN — Medication 80 MCG: at 09:47

## 2025-01-13 RX ADMIN — LIDOCAINE HYDROCHLORIDE 50 MG: 20 INJECTION INTRAVENOUS at 09:00

## 2025-01-13 RX ADMIN — DEXAMETHASONE SODIUM PHOSPHATE 8 MG: 4 INJECTION INTRA-ARTICULAR; INTRALESIONAL; INTRAMUSCULAR; INTRAVENOUS; SOFT TISSUE at 09:05

## 2025-01-13 RX ADMIN — SODIUM CHLORIDE, POTASSIUM CHLORIDE, SODIUM LACTATE AND CALCIUM CHLORIDE 20 ML/HR: 600; 310; 30; 20 INJECTION, SOLUTION INTRAVENOUS at 07:39

## 2025-01-13 RX ADMIN — Medication 80 MCG: at 09:30

## 2025-01-13 RX ADMIN — Medication 40 MCG: at 09:20

## 2025-01-13 RX ADMIN — Medication 80 MCG: at 10:11

## 2025-01-13 RX ADMIN — Medication 40 MCG: at 09:45

## 2025-01-13 RX ADMIN — SODIUM CHLORIDE, POTASSIUM CHLORIDE, SODIUM LACTATE AND CALCIUM CHLORIDE: 600; 310; 30; 20 INJECTION, SOLUTION INTRAVENOUS at 10:12

## 2025-01-13 RX ADMIN — ONDANSETRON 4 MG: 2 INJECTION, SOLUTION INTRAMUSCULAR; INTRAVENOUS at 10:05

## 2025-01-13 RX ADMIN — Medication 80 MCG: at 09:24

## 2025-01-13 RX ADMIN — FENTANYL CITRATE 25 MCG: 50 INJECTION, SOLUTION INTRAMUSCULAR; INTRAVENOUS at 08:51

## 2025-01-13 RX ADMIN — EPHEDRINE SULFATE 5 MG: 50 INJECTION, SOLUTION INTRAVENOUS at 09:32

## 2025-01-13 RX ADMIN — ALBUMIN (HUMAN) 250 ML: 12.5 INJECTION, SOLUTION INTRAVENOUS at 09:50

## 2025-01-13 RX ADMIN — FENTANYL CITRATE 25 MCG: 50 INJECTION, SOLUTION INTRAMUSCULAR; INTRAVENOUS at 08:54

## 2025-01-13 RX ADMIN — POVIDONE-IODINE 1 APPLICATION: 5 SOLUTION TOPICAL at 07:40

## 2025-01-13 RX ADMIN — Medication 40 MCG: at 10:01

## 2025-01-13 RX ADMIN — EPHEDRINE SULFATE 5 MG: 50 INJECTION, SOLUTION INTRAVENOUS at 09:14

## 2025-01-13 RX ADMIN — Medication 80 MCG: at 09:38

## 2025-01-13 RX ADMIN — Medication 80 MCG: at 10:02

## 2025-01-13 RX ADMIN — ACETAMINOPHEN 975 MG: 325 TABLET, FILM COATED ORAL at 07:40

## 2025-01-13 RX ADMIN — CEFAZOLIN 1 G: 1 INJECTION, POWDER, FOR SOLUTION INTRAMUSCULAR; INTRAVENOUS at 09:00

## 2025-01-13 RX ADMIN — EPHEDRINE SULFATE 5 MG: 50 INJECTION, SOLUTION INTRAVENOUS at 09:13

## 2025-01-13 RX ADMIN — Medication 4 MCG: at 08:48

## 2025-01-13 SDOH — HEALTH STABILITY: MENTAL HEALTH: CURRENT SMOKER: 0

## 2025-01-13 ASSESSMENT — PAIN SCALES - GENERAL
PAINLEVEL_OUTOF10: 0 - NO PAIN

## 2025-01-13 ASSESSMENT — PAIN - FUNCTIONAL ASSESSMENT
PAIN_FUNCTIONAL_ASSESSMENT: 0-10
PAIN_FUNCTIONAL_ASSESSMENT: 0-10

## 2025-01-13 ASSESSMENT — COGNITIVE AND FUNCTIONAL STATUS - GENERAL
MOBILITY SCORE: 20
WALKING IN HOSPITAL ROOM: A LITTLE
STANDING UP FROM CHAIR USING ARMS: A LITTLE
CLIMB 3 TO 5 STEPS WITH RAILING: A LITTLE
MOVING TO AND FROM BED TO CHAIR: A LITTLE

## 2025-01-13 ASSESSMENT — COLUMBIA-SUICIDE SEVERITY RATING SCALE - C-SSRS
1. IN THE PAST MONTH, HAVE YOU WISHED YOU WERE DEAD OR WISHED YOU COULD GO TO SLEEP AND NOT WAKE UP?: NO
2. HAVE YOU ACTUALLY HAD ANY THOUGHTS OF KILLING YOURSELF?: NO
6. HAVE YOU EVER DONE ANYTHING, STARTED TO DO ANYTHING, OR PREPARED TO DO ANYTHING TO END YOUR LIFE?: NO

## 2025-01-13 NOTE — ANESTHESIA PREPROCEDURE EVALUATION
Patient: Vicky Govea    Procedure Information       Date/Time: 01/13/25 0835    Procedure: OPEN TOTAL KNEE ARTHROPLASTY (Right: Knee)    Location: [facility] OR 04 / Virtual [facility] OR    Surgeons: Gurinder Forrester, DO            Relevant Problems   Anesthesia   (+) PONV (postoperative nausea and vomiting)      Cardiac   (+) Benign essential hypertension   (+) Hyperlipidemia      Pulmonary   (+) COPD (chronic obstructive pulmonary disease) (Multi)      Neuro   (+) Idiopathic peripheral neuropathy      GI   (+) Crohn's disease of small intestine with rectal bleeding (Multi)   (+) GERD (gastroesophageal reflux disease)   (+) Irritable bowel syndrome with diarrhea      /Renal   (+) Calculus of kidney      Liver (within normal limits)      Endocrine (within normal limits)      Hematology   (+) Anemia      Musculoskeletal   (+) Primary osteoarthritis involving multiple joints      HEENT (within normal limits)      ID (within normal limits)      Skin (within normal limits)      GYN   (+) History of hysterectomy       Clinical information reviewed:   Tobacco  Allergies  Meds   Med Hx  Surg Hx   Fam Hx  Soc Hx        NPO Detail:  NPO/Void Status  Date of Last Solid: 01/12/25  Time of Last Solid: 2330  Last Intake Type: Food         Physical Exam    Airway  Mallampati: II  TM distance: >3 FB  Neck ROM: full     Cardiovascular    Dental - normal exam     Pulmonary    Abdominal            Anesthesia Plan    History of general anesthesia?: yes  History of complications of general anesthesia?: no    ASA 3     spinal     The patient is not a current smoker.    intravenous induction   Postoperative administration of opioids is intended.  Anesthetic plan and risks discussed with patient.  Use of blood products discussed with patient who.    Plan discussed with CRNA.

## 2025-01-13 NOTE — ANESTHESIA PROCEDURE NOTES
Spinal Block    Patient location during procedure: OR  Start time: 1/13/2025 8:53 AM  End time: 1/13/2025 8:58 AM  Reason for block: primary anesthetic and procedure for pain  Staffing  Performed: CRNA   Authorized by: Jc Sanderson DO    Performed by: NATE Duggan-CRNA    Preanesthetic Checklist  Completed: patient identified, IV checked, risks and benefits discussed, surgical consent, monitors and equipment checked, pre-op evaluation, timeout performed and sterile techniques followed  Block Timeout  RN/Licensed healthcare professional reads aloud to the Anesthesia provider and entire team: Patient identity, procedure with side and site, patient position, and as applicable the availability of implants/special equipment/special requirements.  Patient on coagulant treatment: no  Timeout performed at: 1/13/2025 8:50 AM  Spinal Block  Patient position: sitting  Prep: Betadine  Sterility prep: cap, drape, gloves, hand hygiene and mask  Sedation level: light sedation  Patient monitoring: blood pressure, heart rate and continuous pulse oximetry  Approach: midline  Vertebral space: L3-4  Injection technique: single-shot  Needle  Needle type: pencil-point   Needle gauge: 24 G  Needle length: 4 in  Free flowing CSF: yes    Assessment  Sensory level: T6 bilateral  Block outcome: patient comfortable  Procedure assessment: patient sedated but conversant throughout procedure and patient tolerated procedure well with no immediate complications  Additional Notes  1% lidocaine local skin wheel.  Easy spinal x1 attempt.  Patient tolerated procedure well with no complaints.  Surgical level achieved.

## 2025-01-13 NOTE — HH CARE COORDINATION
Home Care received a Referral for Physical Therapy. We have processed the referral for a Start of Care on 1/14/25.     If you have any questions or concerns, please feel free to contact us at [phone]. Follow the prompts, enter your five digit zip code, and you will be directed to your care team on [facility].

## 2025-01-13 NOTE — OP NOTE
RIGHT TOTAL KNEE ARTHROPLASTY     Date: 2025   OR Location: [facility] OR    Name: Vicky Govea  : 1945    MRN: [id number]    Diagnosis  Pre-op Diagnosis      * Primary osteoarthritis of right knee [M17.11]  Post-op Diagnosis     * Primary osteoarthritis of right knee [M17.11]      Procedures  OPEN TOTAL KNEE ARTHROPLASTY  14681 - CA ARTHRP KNE CONDYLE&PLATU MEDIAL&LAT COMPARTMENTS      Surgeons      * Gurinder Forrester - Primary     Specimen: none.    Staff: Circulator: Ro Mayfield RN  Relief Circulator: Yani Mendiola RN  Scrub Person: Deyanira Lassiter SA  RNFA: ENID Pritchett     Drains and/or Catheters: none    Estimated Blood Loss: 25 cc    Resident/Fellow/Other Assistant:  Surgeons and Role:  * No surgeons found with a matching role *     Procedure Summary  Anesthesia: Consult    Anesthesia Staff: Anesthesiologist: Jc Sanderson DO  CRNA: NATE Duggan-CRNA   ASA: III       Intra-op Medications:   - 1 Gram Tranexamic acid prior to surgical incision  - 1 Gram Tranexamic acid at start of incision close  - KARLIE Pain cockail: ropivacaine-epinephrine-clonidine-ketorolac 2.46-0.005- 0.0008-0.3mg/mL periarticular syringe: 50 cc    IMPLANTS:   Implant Name Type Inv. Item Serial No.  Lot No. LRB No. Used Action   BONE CEMENT, SMART SET, HIGH VISCOSITY, 40GM - [id number] Joint Knee BONE CEMENT, SMART SET, HIGH VISCOSITY, 40GM  DEPUY [id number] Right 1 Implanted   FEMORAL, ATTUNE PS, MERRY, NRW, SZ 5, RT - [id number] Joint Knee FEMORAL, ATTUNE PS, MERRY, NRW, SZ 5, RT  DEPUY [id number] Right 1 Implanted   DOME, PATELLA, MEDIALIZED, 32MM - [id number] Joint Knee DOME, PATELLA, MEDIALIZED, 32MM  DEPUY [id number] Right 1 Implanted   INSERT, ATTUNE PS RP, SZ 5, 5MM - [id number] Joint Knee INSERT, ATTUNE PS RP, SZ 5, 5MM  DEPUY [id number] Right 1 Implanted   TIBAL BASE, ATTUNE, ROTATING PLATFORM, MERRY, SZ 4 - [id number] Joint Knee TIBAL BASE, ATTUNE, ROTATING PLATFORM, MERRY, SZ 4  DEPUY [id number] Right 1  Implanted       Findings: See operative note    Operative Indications:  Vicky Govea is a patient that is well-known to me and initially presented as an outpatient. They have been treated for advanced knee osteoarthritis with therapy, anti-inflammatories, and activity modification, all without improvement of symptoms. They are here for surgery for Pre-op Diagnosis      * Primary osteoarthritis of right knee [M17.11].     We therefore reviewed the alternative option of elective total knee arthroplasty. The risks and benefits of this procedure were discussed in detail as an outpatient and are documented in our outpatient record. The patient expressed full understanding and wished to proceed. Informed consent was obtained and was placed on the medical chart    The risks, benefits and potential complications of the arthroplasty surgery were discussed with the patient in detail.  Risks including but not limited to the risk of anesthesia, DVT, pulmonary embolism with the possibility of death, retained infection, and neurovascular injury.  Specific details of the procedure, hospitalization, recovery, rehabilitation, and long-term precautions were also provided. Pre-operative teaching was provided. Implant/prosthesis selection was outlined, and the many options available were explained; the final choice will be made at the time of the procedure to match the anatomy and condition of the bone, ligaments, tendons, and muscles. Understanding of all topics was conveyed to me by the patient, and consent was given to proceed with a total knee arthroplasty.     On the day of surgery the site of surgery was properly noted/marked if necessary per policy. The patient has been actively warmed in preoperative area. Preoperative antibiotics were confirmed and started prior to surgical incision. Venous thrombosis prophylaxis have been ordered including bilateral sequential compression devices to start in pre-operative area.    At the  time of surgery there was found to be severe osteoarthritis in the right knee with valgus deformity.  Bone-on-bone changes were noted in the lateral and patellofemoral joints.       Procedure:     Patient was brought to the operative suite where satisfactory spinal anesthetic was administered by department anesthesia and an adductor canal block.  Patient was placed in supine position on the operative table.  The left lower extremity was sterilely draped and prepped in routine surgical fashion.  A tourniquet was applied in the upper one third of the patient's right thigh.  The right lower was exsanguinated free of blood tourniquet was inflated to 275 mmHg.  Landmarks were identified.  An incision was carried out over the anterior medial aspect of the patient's left knee extending approximately 9 cm in length in curvilinear fashion.  A try vector quad sparing incision was created.  The patella was everted 90 degrees fat pad was excised.  The patella was measured and found to be 23 mm thick.  The arthritic articular cartilage was resected from the patella using a sagittal saw.  A size 32trial patella was placed.  The height was measured found to be 24 mm thick.  A protective plate was then placed on the cut surface of the patella and the knee was flexed.  Osteophytes removed with rongeur ACL and PCL were sacrificed along the medial lateral meniscus.    A  hole was created into the distal femur for intramedullary instrumentation.  The distal femoral cut was performed.  The femur was sized and found be a size 5N.  A size 5 cutting block was pinned to the distal femur and appropriate cuts were carried out using a sagittal saw.  The notch cut was performed using a reciprocating saw.  Attention was then directed to the tibia where a  hole was created for intramedullary instrumentation.  Cutting block assembly was applied over the guide azar.  Proximal tibia cut was performed using a sagittal saw the tibia was sized  and found to be a size 4.  Trial implants were inserted using a size 5N femur size 4 tibia 5 mm spacer and a 32 patella.  There was found to be excellent range of motion and stability.  Satisfactory tracking of the patellofemoral joint was appreciated.    Trial components were removed the proximal tibia was finished using a drill and broach.  Bony ends were lavaged and dried bone cement was mixed.  A size 4 tibial baseplate was cemented into position a size 5 narrow posterior stabilized femoral component was cemented into position.  A size 32 patella was cemented into position.  Excess bone cement was removed.  A size 5 rotating platform polyethylene was placed on the tibial baseplate and the knee was reduced.  The knee was taken through full range of motion found of excellent motion and stability.    The pericapsular tissues and subcutaneous tissues were injected with ropivacaine, saline, Toradol, and epinephrine for postoperative pain control.    The subcutaneous tissues were injected with ropivacaine, Toradol, and saline.  Tourniquet was released good blood flow was noted to return to the patient's right lower extremity.  Layer closure was carried out using a running lock #1 undyed Vicryl for the deep fascia.  Interrupted #2 FiberWire sutures in a barbed #2 suture was also used.  A 2-0 undyed Vicryl for subcutaneous tissues.  A subcuticular closure with 3 oh V-Loc followed by Dermabond Steri-Strips and a Mepilex silver dressing was applied.  DELROY hose and Ace bandage was applied.  Patient was transferred to the PACU having tolerated the procedure well.                                  Weight Bearing Status:  WBAT Bilateral LE                        Range of Motion: As tolerated                               Dressing: Maintain for one week                        Discharge/Follow-up: Follow up in clinic in two weeks    Gurinder Forrester D.O.      This note was dictated using speech recognition software and was not  corrected for spelling or grammatical errors

## 2025-01-13 NOTE — ANESTHESIA POSTPROCEDURE EVALUATION
Patient: Vicky Govea    Procedure Summary       Date: 01/13/25 Room / Location: [facility] 04 / [facility]    Anesthesia Start: 0846 Anesthesia Stop: 1026    Procedure: OPEN TOTAL KNEE ARTHROPLASTY (Right: Knee) Diagnosis:       Primary osteoarthritis of right knee      (Primary osteoarthritis of right knee [M17.11])    Surgeons: Gurinder Forrester DO Responsible Provider: Jc Sanderson DO    Anesthesia Type: spinal ASA Status: 3            Anesthesia Type: spinal    Vitals Value Taken Time   /45 01/13/25 1040   Temp 36 °C (96.8 °F) 01/13/25 1025   Pulse 82 01/13/25 1040   Resp 14 01/13/25 1040   SpO2 95 % 01/13/25 1040       Anesthesia Post Evaluation    Patient location during evaluation: PACU  Patient participation: complete - patient participated  Level of consciousness: awake  Pain management: adequate  Multimodal analgesia pain management approach  Airway patency: patent  Two or more strategies used to mitigate risk of obstructive sleep apnea  Cardiovascular status: acceptable  Respiratory status: acceptable  Hydration status: acceptable  Postoperative Nausea and Vomiting: none        No notable events documented.

## 2025-01-13 NOTE — ANESTHESIA PROCEDURE NOTES
Peripheral Block    Patient location during procedure: OR  Start time: 1/13/2025 8:07 AM  End time: 1/13/2025 8:13 AM  Reason for block: at surgeon's request and post-op pain management  Staffing  Performed: attending   Authorized by: Jc Sanderson DO    Performed by: Jc Sanderson DO  Preanesthetic Checklist  Completed: patient identified, IV checked, site marked, risks and benefits discussed, surgical consent, monitors and equipment checked, pre-op evaluation and timeout performed   Timeout performed at: 1/13/2025 8:07 AM  Peripheral Block  Patient position: laying flat  Prep: ChloraPrep  Patient monitoring: heart rate, continuous pulse ox and cardiac monitor  Block type: adductor canal  Injection technique: single-shot  Guidance: ultrasound guided  Local infiltration: ropivacaine  Infiltration strength: 0.5 %  Dose: 20 mL  Needle  Needle type: short-bevel   Needle gauge: 22 G  Needle length: 8 cm  Needle localization: ultrasound guidance     image stored in chart  Assessment  Injection assessment: negative aspiration for heme, incremental injection and local visualized surrounding nerve on ultrasound  Heart rate change: no  Slow fractionated injection: yes  Additional Notes  Adductor canal block: Informed consent obtained.  Risks, benefits, and alternatives discussed.  ASA monitors placed, general anesthesia induced and timeout performed.  Patient positioned, prepped with chlorhexidine, and draped with sterile towels.      Ultrasound guidance was used to visualize the saphenous nerve at the adductor canal and surrounding structures with visualization of the needle throughout duration of the procedure. Aspiration negative. A total of ropi 0.5 % 20 ml and 4mg Dexamethasone was divided and injected on right side. Patient tolerated procedure well without issue.     Timeout by: Brittnee Pulliam RN

## 2025-01-13 NOTE — PROGRESS NOTES
Physical Therapy    Physical Therapy Evaluation    Patient Name: Vicky Govea  MRN: [id number]  Department:   Room: [facility] OR  Today's Date: 1/13/2025   Time Calculation  Start Time: 1413  Stop Time: 1449  Time Calculation (min): 36 min    Assessment/Plan   PT Assessment  PT Assessment Results: Decreased mobility, Orthopedic restrictions  Rehab Prognosis: Excellent  Barriers to Discharge Home: No anticipated barriers  Evaluation/Treatment Tolerance: Patient tolerated treatment well  Medical Staff Made Aware: Yes  Strengths: Ability to acquire knowledge  End of Session Communication: Bedside nurse  End of Session Patient Position:  (Pt sitting up in her wheelchair waiting for her RN to remove her IV port and the D/C home with her family)     PT Plan  PT Eval Only Reason: Safe to return home  PT Discharge Recommendations: Low intensity level of continued care  Equipment Recommended upon Discharge: Wheeled walker  PT Recommended Transfer Status: Independent  PT - OK to Discharge: Yes (LOW follow up services)    Subjective   General Visit Information:  General  Reason for Referral: 81 yo female admitted 2' to R knee OA, s/p R TKA  Referred By: Dr. MELANIE Forrester  Family/Caregiver Present:  (spouse and daughter)  Prior to Session Communication: Bedside nurse  Patient Position Received: Bed, 3 rail up, Alarm off, not on at start of session  Preferred Learning Style: verbal, visual, written  General Comment: Pt is pleasant and agreeable to work with PT. PT went over Pt's TKA precautions and Pt performed her TKA protocol ex's. Pt doesn't need further PT here as she is D/C home with her family today. Recommend LOW follow up services  Home Living:  Home Living  Type of Home: House  Lives With: Spouse  Home Adaptive Equipment: Walker rolling or standard, Cane  Home Layout: Two level, Stairs to alternate level with rails  Alternate Level Stairs-Rails:  (1 hand rail and opposing wall to hold on to)  Alternate Level  Stairs-Number of Steps: 13  Home Access: Stairs to enter with rails  Entrance Stairs-Rails: Both  Entrance Stairs-Number of Steps: 3  Bathroom Shower/Tub: Walk-in shower  Bathroom Toilet: Standard  Bathroom Equipment: Grab bars in shower, Shower chair with back  Prior Level of Function:  Prior Function Per Pt/Caregiver Report  Level of Pull[place]: Independent with ADLs and functional transfers, Independent with homemaking with ambulation (no device)  Receives Help From: Family  Ambulatory Assistance: Independent  Precautions:  Precautions  LE Weight Bearing Status: Weight Bearing as Tolerated (R LE)  Post-Surgical Precautions: Right total knee precautions     Vital Signs (Past 2hrs)        Date/Time Vitals Session Patient Position Pulse Resp SpO2 BP MAP (mmHg)    01/13/25 1335 --  --  79  16  98 %  120/78  --                        Objective   Pain:  Pain Assessment  Pain Assessment: 0-10  0-10 (Numeric) Pain Score: 0 - No pain  Cognition:  Cognition  Overall Cognitive Status: Within Functional Limits    General Assessments:  General Observation  General Observation: Pt performed the following supine ex's; B AP, R QS, glut sets, R heel slides, R hip abd, R SAQ each x 20 reps               Activity Tolerance  Endurance: Endurance does not limit participation in activity    Sensation  Light Touch: No apparent deficits    Strength  Strength Comments: B UE and LE are WFL  Static Sitting Balance  Static Sitting-Balance Support: Bilateral upper extremity supported  Static Sitting-Level of Assistance: Distant supervision    Static Standing Balance  Static Standing-Balance Support: Bilateral upper extremity supported (wheeled walker)  Static Standing-Level of Assistance: Close supervision  Dynamic Standing Balance  Dynamic Standing-Balance Support: Bilateral upper extremity supported (wheeled walker)  Dynamic Standing-Level of Assistance: Close supervision  Functional Assessments:  Bed Mobility  Bed Mobility: Yes  Bed  Mobility 1  Bed Mobility 1: Supine to sitting  Level of Assistance 1: Distant supervision    Transfers  Transfer: Yes  Transfer 1  Transfer From 1: Bed to  Transfer to 1: Stand  Technique 1: Sit to stand, Stand to sit  Transfer Device 1:  (wheeled walker)  Transfer Level of Assistance 1: Close supervision    Ambulation/Gait Training  Ambulation/Gait Training Performed: Yes  Ambulation/Gait Training 1  Surface 1: Level tile  Device 1: Rolling walker  Assistance 1: Close supervision  Quality of Gait 1: Decreased step length (Decreased elmira)  Comments/Distance (ft) 1: 100 feet    Stairs  Stairs: Yes  Stairs  Rails 1: Bilateral  Curb Step 1: No  Device 1: No device  Assistance 1: Close supervision  Comment/Number of Steps 1: 3  Extremity/Trunk Assessments:  RUE   RUE : Within Functional Limits  LUE   LUE: Within Functional Limits  RLE   RLE : Within Functional Limits  LLE   LLE : Within Functional Limits  Outcome Measures:  [facility] Basic Mobility  Turning from your back to your side while in a flat bed without using bedrails: None  Moving from lying on your back to sitting on the side of a flat bed without using bedrails: None  Moving to and from bed to chair (including a wheelchair): A little  Standing up from a chair using your arms (e.g. wheelchair or bedside chair): A little  To walk in hospital room: A little  Climbing 3-5 steps with railing: A little  Basic Mobility - Total Score: 20        Education Documentation  Handouts, taught by Abelino Mandujano PT at 1/13/2025  3:20 PM.  Learner: Patient  Readiness: Eager  Method: Explanation, Demonstration, Handout  Response: Verbalizes Understanding, Demonstrated Understanding    Precautions, taught by Abelino Mandujano PT at 1/13/2025  3:20 PM.  Learner: Patient  Readiness: Eager  Method: Explanation, Demonstration, Handout  Response: Verbalizes Understanding, Demonstrated Understanding    Home Exercise Program, taught by Abelino Mandujano PT at 1/13/2025  3:20 PM.  Learner:  Patient  Readiness: Eager  Method: Explanation, Demonstration, Handout  Response: Verbalizes Understanding, Demonstrated Understanding    Education Comments  No comments found.

## 2025-01-14 ENCOUNTER — HOME CARE VISIT (OUTPATIENT)
Dept: HOME HEALTH SERVICES | Facility: HOME HEALTH | Age: 80
End: 2025-01-14
Payer: MEDICARE

## 2025-01-14 VITALS
RESPIRATION RATE: 18 BRPM | HEART RATE: 82 BPM | WEIGHT: 128 LBS | HEIGHT: 60 IN | SYSTOLIC BLOOD PRESSURE: 120 MMHG | BODY MASS INDEX: 25.13 KG/M2 | DIASTOLIC BLOOD PRESSURE: 50 MMHG | TEMPERATURE: 98.3 F | OXYGEN SATURATION: 99 %

## 2025-01-14 PROCEDURE — 169592 NO-PAY CLAIM PROCEDURE

## 2025-01-14 PROCEDURE — G0151 HHCP-SERV OF PT,EA 15 MIN: HCPCS | Mod: HHH

## 2025-01-14 ASSESSMENT — ENCOUNTER SYMPTOMS
HIGHEST PAIN SEVERITY IN PAST 24 HOURS: 5/10
PAIN LOCATION - PAIN SEVERITY: 5/10
PAIN LOCATION - RELIEVING FACTORS: REST
PAIN LOCATION - EXACERBATING FACTORS: LAYING ON HER SIDE
PAIN LOCATION: RIGHT KNEE
PAIN LOCATION - PAIN QUALITY: ACHE
PAIN LOCATION - PAIN SEVERITY: 1/10
OCCASIONAL FEELINGS OF UNSTEADINESS: 1
PAIN LOCATION: LEFT HIP
PAIN LOCATION - PAIN FREQUENCY: CONSTANT
PERSON REPORTING PAIN: PATIENT
PAIN LOCATION - EXACERBATING FACTORS: MOVEMENT
LOWEST PAIN SEVERITY IN PAST 24 HOURS: 2/10
PAIN: 1
PAIN LOCATION - PAIN QUALITY: ACHE
SUBJECTIVE PAIN PROGRESSION: UNCHANGED
PAIN LOCATION - PAIN FREQUENCY: INTERMITTENT
PAIN LOCATION - RELIEVING FACTORS: REST, ICE, MEDS

## 2025-01-14 ASSESSMENT — ACTIVITIES OF DAILY LIVING (ADL)
AMBULATION ASSISTANCE: 1
CURRENT_FUNCTION: MINIMUM ASSIST
OASIS_M1830: 05
AMBULATION ASSISTANCE ON FLAT SURFACES: 1
PHYSICAL TRANSFERS ASSESSED: 1
ENTERING_EXITING_HOME: MODERATE ASSIST
AMBULATION ASSISTANCE: SUPERVISION

## 2025-01-16 ENCOUNTER — HOME CARE VISIT (OUTPATIENT)
Dept: HOME HEALTH SERVICES | Facility: HOME HEALTH | Age: 80
End: 2025-01-16
Payer: MEDICARE

## 2025-01-16 PROCEDURE — G0151 HHCP-SERV OF PT,EA 15 MIN: HCPCS | Mod: HHH

## 2025-01-16 ASSESSMENT — ENCOUNTER SYMPTOMS
PAIN LOCATION: RIGHT KNEE
HIGHEST PAIN SEVERITY IN PAST 24 HOURS: 8/10
PAIN: 1
PERSON REPORTING PAIN: PATIENT
PAIN LOCATION - EXACERBATING FACTORS: MOVEMENT
PAIN LOCATION - PAIN FREQUENCY: CONSTANT
PAIN LOCATION - PAIN QUALITY: ACHE
LOWEST PAIN SEVERITY IN PAST 24 HOURS: 5/10
PAIN LOCATION - PAIN SEVERITY: 6/10
SUBJECTIVE PAIN PROGRESSION: UNCHANGED

## 2025-01-17 ENCOUNTER — TELEPHONE (OUTPATIENT)
Dept: INPATIENT UNIT | Facility: HOSPITAL | Age: 80
End: 2025-01-17
Payer: MEDICARE

## 2025-01-21 ENCOUNTER — TELEPHONE (OUTPATIENT)
Dept: INPATIENT UNIT | Facility: HOSPITAL | Age: 80
End: 2025-01-21
Payer: MEDICARE

## 2025-01-21 ENCOUNTER — HOME CARE VISIT (OUTPATIENT)
Dept: HOME HEALTH SERVICES | Facility: HOME HEALTH | Age: 80
End: 2025-01-21
Payer: MEDICARE

## 2025-01-21 PROCEDURE — G0151 HHCP-SERV OF PT,EA 15 MIN: HCPCS | Mod: HHH

## 2025-01-21 ASSESSMENT — ENCOUNTER SYMPTOMS
PAIN: 1
PAIN LOCATION - EXACERBATING FACTORS: MOVEMENT
SUBJECTIVE PAIN PROGRESSION: UNCHANGED
HIGHEST PAIN SEVERITY IN PAST 24 HOURS: 10/10
PAIN LOCATION - PAIN QUALITY: ACHE
PERSON REPORTING PAIN: PATIENT
PAIN LOCATION: RIGHT KNEE
PAIN LOCATION - PAIN SEVERITY: 8/10
LOWEST PAIN SEVERITY IN PAST 24 HOURS: 5/10
PAIN LOCATION - PAIN FREQUENCY: CONSTANT

## 2025-01-22 ENCOUNTER — APPOINTMENT (OUTPATIENT)
Dept: OPHTHALMOLOGY | Facility: CLINIC | Age: 80
End: 2025-01-22
Payer: MEDICARE

## 2025-01-23 ENCOUNTER — HOME CARE VISIT (OUTPATIENT)
Dept: HOME HEALTH SERVICES | Facility: HOME HEALTH | Age: 80
End: 2025-01-23
Payer: MEDICARE

## 2025-01-23 PROCEDURE — G0151 HHCP-SERV OF PT,EA 15 MIN: HCPCS | Mod: HHH

## 2025-01-23 ASSESSMENT — ENCOUNTER SYMPTOMS
HIGHEST PAIN SEVERITY IN PAST 24 HOURS: 10/10
PAIN LOCATION: RIGHT KNEE
PAIN LOCATION - PAIN FREQUENCY: CONSTANT
PAIN: 1
PERSON REPORTING PAIN: PATIENT
LOWEST PAIN SEVERITY IN PAST 24 HOURS: 6/10
PAIN LOCATION - RELIEVING FACTORS: ICE, MEDS, REST
PAIN LOCATION - PAIN QUALITY: ACHE
PAIN LOCATION - PAIN SEVERITY: 7/10
SUBJECTIVE PAIN PROGRESSION: UNCHANGED

## 2025-01-26 ENCOUNTER — APPOINTMENT (OUTPATIENT)
Dept: RADIOLOGY | Facility: HOSPITAL | Age: 80
End: 2025-01-26
Payer: MEDICARE

## 2025-01-26 ENCOUNTER — HOSPITAL ENCOUNTER (EMERGENCY)
Facility: HOSPITAL | Age: 80
Discharge: HOME | End: 2025-01-27
Attending: EMERGENCY MEDICINE
Payer: MEDICARE

## 2025-01-26 DIAGNOSIS — M25.561 ACUTE PAIN OF RIGHT KNEE: Primary | ICD-10-CM

## 2025-01-26 LAB
ALBUMIN SERPL BCP-MCNC: 3.9 G/DL (ref 3.4–5)
ALP SERPL-CCNC: 68 U/L (ref 33–136)
ALT SERPL W P-5'-P-CCNC: 14 U/L (ref 7–45)
ANION GAP SERPL CALC-SCNC: 17 MMOL/L (ref 10–20)
AST SERPL W P-5'-P-CCNC: 16 U/L (ref 9–39)
BASOPHILS # BLD AUTO: 0.03 X10*3/UL (ref 0–0.1)
BASOPHILS NFR BLD AUTO: 0.2 %
BILIRUB SERPL-MCNC: 0.5 MG/DL (ref 0–1.2)
BUN SERPL-MCNC: 45 MG/DL (ref 6–23)
CALCIUM SERPL-MCNC: 10.3 MG/DL (ref 8.6–10.3)
CHLORIDE SERPL-SCNC: 99 MMOL/L (ref 98–107)
CO2 SERPL-SCNC: 27 MMOL/L (ref 21–32)
CREAT SERPL-MCNC: 1.98 MG/DL (ref 0.5–1.05)
D DIMER PPP FEU-MCNC: 4839 NG/ML FEU
EGFRCR SERPLBLD CKD-EPI 2021: 25 ML/MIN/1.73M*2
EOSINOPHIL # BLD AUTO: 0.24 X10*3/UL (ref 0–0.4)
EOSINOPHIL NFR BLD AUTO: 1.4 %
ERYTHROCYTE [DISTWIDTH] IN BLOOD BY AUTOMATED COUNT: 12.4 % (ref 11.5–14.5)
GLUCOSE SERPL-MCNC: 96 MG/DL (ref 74–99)
HCT VFR BLD AUTO: 30.6 % (ref 36–46)
HGB BLD-MCNC: 9.3 G/DL (ref 12–16)
IMM GRANULOCYTES # BLD AUTO: 0.06 X10*3/UL (ref 0–0.5)
IMM GRANULOCYTES NFR BLD AUTO: 0.4 % (ref 0–0.9)
LYMPHOCYTES # BLD AUTO: 1.67 X10*3/UL (ref 0.8–3)
LYMPHOCYTES NFR BLD AUTO: 9.8 %
MAGNESIUM SERPL-MCNC: 1.97 MG/DL (ref 1.6–2.4)
MCH RBC QN AUTO: 29.8 PG (ref 26–34)
MCHC RBC AUTO-ENTMCNC: 30.4 G/DL (ref 32–36)
MCV RBC AUTO: 98 FL (ref 80–100)
MONOCYTES # BLD AUTO: 1.35 X10*3/UL (ref 0.05–0.8)
MONOCYTES NFR BLD AUTO: 7.9 %
NEUTROPHILS # BLD AUTO: 13.67 X10*3/UL (ref 1.6–5.5)
NEUTROPHILS NFR BLD AUTO: 80.3 %
NRBC BLD-RTO: 0 /100 WBCS (ref 0–0)
PLATELET # BLD AUTO: 455 X10*3/UL (ref 150–450)
POTASSIUM SERPL-SCNC: 4.1 MMOL/L (ref 3.5–5.3)
PROT SERPL-MCNC: 7.2 G/DL (ref 6.4–8.2)
RBC # BLD AUTO: 3.12 X10*6/UL (ref 4–5.2)
SODIUM SERPL-SCNC: 139 MMOL/L (ref 136–145)
WBC # BLD AUTO: 17 X10*3/UL (ref 4.4–11.3)

## 2025-01-26 PROCEDURE — 96376 TX/PRO/DX INJ SAME DRUG ADON: CPT

## 2025-01-26 PROCEDURE — 96374 THER/PROPH/DIAG INJ IV PUSH: CPT

## 2025-01-26 PROCEDURE — 36415 COLL VENOUS BLD VENIPUNCTURE: CPT | Performed by: EMERGENCY MEDICINE

## 2025-01-26 PROCEDURE — 99285 EMERGENCY DEPT VISIT HI MDM: CPT | Mod: 25 | Performed by: EMERGENCY MEDICINE

## 2025-01-26 PROCEDURE — 2500000001 HC RX 250 WO HCPCS SELF ADMINISTERED DRUGS (ALT 637 FOR MEDICARE OP): Performed by: EMERGENCY MEDICINE

## 2025-01-26 PROCEDURE — 83735 ASSAY OF MAGNESIUM: CPT | Performed by: EMERGENCY MEDICINE

## 2025-01-26 PROCEDURE — 2500000004 HC RX 250 GENERAL PHARMACY W/ HCPCS (ALT 636 FOR OP/ED): Mod: JZ | Performed by: EMERGENCY MEDICINE

## 2025-01-26 PROCEDURE — 93971 EXTREMITY STUDY: CPT

## 2025-01-26 PROCEDURE — 96375 TX/PRO/DX INJ NEW DRUG ADDON: CPT

## 2025-01-26 PROCEDURE — 73562 X-RAY EXAM OF KNEE 3: CPT | Mod: RT

## 2025-01-26 PROCEDURE — 80053 COMPREHEN METABOLIC PANEL: CPT | Performed by: EMERGENCY MEDICINE

## 2025-01-26 PROCEDURE — 73562 X-RAY EXAM OF KNEE 3: CPT | Mod: RIGHT SIDE | Performed by: RADIOLOGY

## 2025-01-26 PROCEDURE — 85025 COMPLETE CBC W/AUTO DIFF WBC: CPT | Performed by: EMERGENCY MEDICINE

## 2025-01-26 PROCEDURE — 85379 FIBRIN DEGRADATION QUANT: CPT | Performed by: EMERGENCY MEDICINE

## 2025-01-26 PROCEDURE — 93971 EXTREMITY STUDY: CPT | Performed by: RADIOLOGY

## 2025-01-26 RX ORDER — DEXAMETHASONE SODIUM PHOSPHATE 10 MG/ML
10 INJECTION INTRAMUSCULAR; INTRAVENOUS ONCE
Status: COMPLETED | OUTPATIENT
Start: 2025-01-26 | End: 2025-01-26

## 2025-01-26 RX ORDER — HYDROMORPHONE HYDROCHLORIDE 1 MG/ML
1 INJECTION, SOLUTION INTRAMUSCULAR; INTRAVENOUS; SUBCUTANEOUS ONCE
Status: COMPLETED | OUTPATIENT
Start: 2025-01-26 | End: 2025-01-26

## 2025-01-26 RX ORDER — OXYCODONE AND ACETAMINOPHEN 5; 325 MG/1; MG/1
1 TABLET ORAL ONCE
Status: COMPLETED | OUTPATIENT
Start: 2025-01-26 | End: 2025-01-26

## 2025-01-26 RX ADMIN — HYDROMORPHONE HYDROCHLORIDE 1 MG: 1 INJECTION, SOLUTION INTRAMUSCULAR; INTRAVENOUS; SUBCUTANEOUS at 20:14

## 2025-01-26 RX ADMIN — HYDROMORPHONE HYDROCHLORIDE 1 MG: 1 INJECTION, SOLUTION INTRAMUSCULAR; INTRAVENOUS; SUBCUTANEOUS at 23:20

## 2025-01-26 RX ADMIN — OXYCODONE HYDROCHLORIDE AND ACETAMINOPHEN 1 TABLET: 5; 325 TABLET ORAL at 23:20

## 2025-01-26 RX ADMIN — DEXAMETHASONE SODIUM PHOSPHATE 10 MG: 10 INJECTION, SOLUTION INTRAMUSCULAR; INTRAVENOUS at 21:58

## 2025-01-26 ASSESSMENT — PAIN - FUNCTIONAL ASSESSMENT: PAIN_FUNCTIONAL_ASSESSMENT: 0-10

## 2025-01-26 ASSESSMENT — PAIN SCALES - GENERAL
PAINLEVEL_OUTOF10: 0 - NO PAIN
PAINLEVEL_OUTOF10: 8
PAINLEVEL_OUTOF10: 8
PAINLEVEL_OUTOF10: 10 - WORST POSSIBLE PAIN

## 2025-01-26 ASSESSMENT — PAIN DESCRIPTION - LOCATION
LOCATION: KNEE
LOCATION: KNEE

## 2025-01-26 ASSESSMENT — PAIN DESCRIPTION - ORIENTATION: ORIENTATION: RIGHT

## 2025-01-27 ENCOUNTER — HOME CARE VISIT (OUTPATIENT)
Dept: HOME HEALTH SERVICES | Facility: HOME HEALTH | Age: 80
End: 2025-01-27
Payer: MEDICARE

## 2025-01-27 ENCOUNTER — TELEPHONE (OUTPATIENT)
Dept: PHARMACY | Facility: HOSPITAL | Age: 80
End: 2025-01-27
Payer: MEDICARE

## 2025-01-27 VITALS
HEIGHT: 60 IN | HEART RATE: 80 BPM | RESPIRATION RATE: 17 BRPM | WEIGHT: 140 LBS | SYSTOLIC BLOOD PRESSURE: 126 MMHG | DIASTOLIC BLOOD PRESSURE: 55 MMHG | BODY MASS INDEX: 27.48 KG/M2 | TEMPERATURE: 97.7 F | OXYGEN SATURATION: 100 %

## 2025-01-27 DIAGNOSIS — N18.30 STAGE 3 CHRONIC KIDNEY DISEASE, UNSPECIFIED WHETHER STAGE 3A OR 3B CKD (MULTI): Primary | ICD-10-CM

## 2025-01-27 PROBLEM — M25.561 ACUTE PAIN OF RIGHT KNEE: Status: ACTIVE | Noted: 2023-05-22

## 2025-01-27 RX ORDER — HYDROMORPHONE HYDROCHLORIDE 2 MG/1
2 TABLET ORAL EVERY 12 HOURS PRN
Qty: 6 TABLET | Refills: 0 | Status: SHIPPED | OUTPATIENT
Start: 2025-01-27 | End: 2025-01-30

## 2025-01-27 RX ORDER — HYDROMORPHONE HYDROCHLORIDE 2 MG/1
2 TABLET ORAL EVERY 12 HOURS PRN
Qty: 6 TABLET | Refills: 0 | Status: SHIPPED | OUTPATIENT
Start: 2025-01-27 | End: 2025-01-27

## 2025-01-27 ASSESSMENT — PAIN SCALES - GENERAL: PAINLEVEL_OUTOF10: 5 - MODERATE PAIN

## 2025-01-27 NOTE — ED TRIAGE NOTES
Pt presents in a wheelchair via POV through triage from home for c/o right knee pain post op from a total knee replacement on January 13th with Dr. Forrester. Pt states her pain is ot under control. She has been prescribed Dilaudid 2mg PO Q12 ours(last does was 1500 today) and Percocet 5/325mg PO 2 tabs Q4 hours(last dose was 1100 today). Call light within reach.

## 2025-01-27 NOTE — ED NOTES
Pt came into the ED today due to she had a right knee revision/ transplant on Jan 13 th and she states that the last few days she has been in a lot of pain that she is unable to manage from home-  Pt presents with pressure hose bi-lateral and a pressure bandage on the incision site on her right knee     Lauren Crawley RN  01/26/25 1957

## 2025-01-27 NOTE — TELEPHONE ENCOUNTER
Patient Assistance Program ( PAP) Approval    We are pleased to inform you that your application for the  Patient Assistance Program ( PAP) for medication cost assistance has been approved.     This approval is valid through 1/27/2026 as long as the following criteria continue to be satisfied:    You continue to have active prescription insurance  Your prescription insurance continues to be contracted with our [facility] or our  Specialty Pharmacy (legal names, respectively: [facility], [facility], [facility].).  Your prescription insurance continues to cover and pay towards the cost of your medication(s) (Jardiance).  You continue to meet financial requirements for the program:  Household income less than or equal to 400% of the 2024 Federal Poverty Level  You do not seek reimbursement from any other private or government-funded programs for the medication.  Your prescriber does not discontinue therapy.    Under this program, the pharmacy will first bill your insurance plan for your indemnified specified medication. The [employer] (VAF) will then offset your copay balance, so that your out-of pocket expense for your specialty medication will be $0.00.    Also scheduled 4-week follow-up visit with patient for monitoring of efficacy and/or side effects of Jardiance.     Shanda Martinez, CeliaD

## 2025-01-28 ENCOUNTER — PATIENT MESSAGE (OUTPATIENT)
Dept: RHEUMATOLOGY | Facility: CLINIC | Age: 80
End: 2025-01-28
Payer: MEDICARE

## 2025-01-30 ENCOUNTER — HOME CARE VISIT (OUTPATIENT)
Dept: HOME HEALTH SERVICES | Facility: HOME HEALTH | Age: 80
End: 2025-01-30
Payer: MEDICARE

## 2025-01-30 PROCEDURE — G0151 HHCP-SERV OF PT,EA 15 MIN: HCPCS | Mod: HHH

## 2025-01-30 PROCEDURE — RXMED WILLOW AMBULATORY MEDICATION CHARGE

## 2025-01-30 ASSESSMENT — ENCOUNTER SYMPTOMS
PERSON REPORTING PAIN: PATIENT
SUBJECTIVE PAIN PROGRESSION: GRADUALLY IMPROVING
PAIN LOCATION - PAIN FREQUENCY: CONSTANT
PAIN: 1
PAIN LOCATION - RELIEVING FACTORS: REST, ICE, MEDS
PAIN LOCATION - PAIN SEVERITY: 5/10
HIGHEST PAIN SEVERITY IN PAST 24 HOURS: 8/10
LOWEST PAIN SEVERITY IN PAST 24 HOURS: 3/10
PAIN LOCATION - PAIN QUALITY: ACHE
PAIN LOCATION - EXACERBATING FACTORS: KNEE FLEXION
PAIN LOCATION: RIGHT KNEE

## 2025-01-30 ASSESSMENT — ACTIVITIES OF DAILY LIVING (ADL): AMBULATION ASSISTANCE ON FLAT SURFACES: 1

## 2025-01-31 ENCOUNTER — PHARMACY VISIT (OUTPATIENT)
Dept: PHARMACY | Facility: CLINIC | Age: 80
End: 2025-01-31
Payer: COMMERCIAL

## 2025-01-31 NOTE — ED PROVIDER NOTES
HPI   Chief Complaint   Patient presents with    Knee Pain       80-year-old female here for chief complaint of right knee pain.  Patient had a knee replacement done and has been having pain ever since.  She was given steroids by orthopedics.  The knee replacement was very recent.  She denies any fevers or chills.  She started physical therapy and states that they bent her leg at the hip and she felt a pull on her inner thigh.  She is not sure if this is what she is experiencing.  She denies any numbness weakness or tingling.  She denies any drainage from the site.    Past medical history reviewed              Patient History   Past Medical History:   Diagnosis Date    Abnormal urine findings 05/22/2023    Acquired spondylolisthesis     Acute kidney injury (CMS-HCC) 05/22/2023    Repeat cbc,cmp    Acute non-recurrent frontal sinusitis 08/16/2023    - augmentin twice a day 7 days  -  Nasal saline, increase fluids  -  hand hygiene and infection prevention discussed  -  Warm compress to sinuses  - humidification  - recommend to eat yogurt twice daily while taking antibiotic or a daily probiotic       Acute pharyngitis due to other specified organisms 05/03/2019    Acute bacterial pharyngitis    Age-related nuclear cataract of left eye 05/22/2023    Age-related nuclear cataract of right eye 05/22/2023    Allergic contact dermatitis due to plants, except food 06/11/2018    Contact dermatitis due to poison oak    Allergic reaction to contrast dye 09/12/2023    Angina pectoris 08/16/2023    Arthritis     Arthritis of right knee 05/22/2023    Back pain 09/12/2023    Benign paroxysmal positional vertigo 07/08/2021    Body mass index (BMI) 26.0-26.9, adult 06/17/2021    Body mass index (BMI) of 26.0 to 26.9 in adult    Calculus of kidney 12/28/2015    Recurrent kidney stones    Carpal tunnel syndrome of left wrist 05/22/2023    Carpal tunnel syndrome of right wrist 05/22/2023    Cataract, nuclear sclerotic, both eyes  05/22/2023    Cheilitis 09/02/2021    Chronic epigastric pain 09/12/2023    Chronic kidney disease     Chronic low back pain 05/22/2023    Colitis 09/12/2023    Contusion of right hand 05/22/2023    Contusion of right hand, initial encounter 10/13/2021    Contusion of right hand, initial encounter    Difficulty in walking, not elsewhere classified 03/19/2020    Difficulty walking    Difficulty walking 05/22/2023    Diverticulitis of intestine, part unspecified, without perforation or abscess without bleeding 11/15/2018    Acute diverticulitis    Diverticulosis of large intestine without perforation or abscess without bleeding 10/22/2021    Diverticulosis of colon    Dorsalgia, unspecified 11/15/2018    Acute back pain    Drusen (degenerative) of macula, bilateral 05/22/2023    Dry eye syndrome of left lacrimal gland 06/08/2020    Dry eye syndrome of left lacrimal gland    Encounter for follow-up examination after completed treatment for conditions other than malignant neoplasm 03/04/2016    Hospital discharge follow-up    Epigastric pain 03/03/2016    Dyspepsia    Esophageal reflux 05/22/2023    Estrogen deficiency 05/22/2023    Eyelid abnormality 05/22/2023    Gait difficulty 05/22/2023    Gastritis 09/12/2023    Gastro-esophageal reflux disease with esophagitis, without bleeding 10/30/2020    Reflux esophagitis    Generalized abdominal pain 11/19/2018    Acute generalized abdominal pain    Hemangioma of skin and subcutaneous tissue 09/02/2021    Hiatal hernia 05/22/2023    Hip arthritis 09/17/2013    History of total left knee replacement 05/22/2023    Hyperlipidemia     Hypermetropia, bilateral 07/08/2022    Hypermetropia of both eyes    Hyperopia of both eyes 05/22/2023    Hypertension     Hypomagnesemia 06/17/2021    Hypomagnesemia    IBS (irritable bowel syndrome)     Idiopathic neuropathy     Inflamed seborrheic keratosis 09/02/2021    Injury of finger of right hand 05/22/2023    Left-sided extracranial  carotid artery occlusion 12/19/2018    Lentigines 09/02/2021    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    Lumbar radicular pain 07/10/2013    Macular degeneration     bilaterally    Mass of joint of left wrist 05/22/2023    Melanocytic nevi of trunk 09/02/2021    Melanocytic nevi of unspecified lower limb, including hip 09/02/2021    Melanocytic nevi of unspecified upper limb, including shoulder 09/02/2021    Menopausal symptom 09/12/2023    Mild vitamin D deficiency 05/22/2023    Muscle spasm of back 05/01/2017    Back muscle spasm    Myalgia due to statin 05/22/2023    Neoplasm of uncertain behavior of skin 09/02/2021    Neurogenic 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 05/22/2023    Ocular rosacea 05/22/2023    Ocular rosacea 05/22/2023    Osteoarthritis, hand 05/22/2023    Osteoarthritis, knee 02/13/2015    Osteopenia 09/12/2023    Osteoporosis     Other long term (current) drug therapy 07/11/2022    Encounter for long-term current use of high risk medication    Other nail disorders 10/09/2018    Leukonychia striata    Other reduced mobility 03/19/2020    Impaired mobility and ADLs    Other specified abnormal findings of blood chemistry 10/09/2018    Elevated serum creatinine    Other specified joint disorders, left wrist 02/08/2021    Mass of joint of left wrist    Other specified postprocedural states 09/19/2017    History of colonoscopy    Other symptoms and signs involving the musculoskeletal system 07/16/2021    Wrist weakness    Other symptoms and signs involving the musculoskeletal system 10/28/2019    Weakness of both lower extremities    Overweight 10/26/2021    Overweight with body mass index (BMI) of 26 to 26.9 in adult    Pain due to internal orthopedic prosthetic devices, implants and grafts, initial encounter (CMS-HCC) 01/14/2020    Pain due to total knee replacement, initial encounter    Pain in both hands 05/22/2023    Pain in both wrists 05/22/2023    Pain in left hip 06/25/2021    Pain of left hip  joint    Pain in left knee 04/16/2020    Left knee pain    Pain in right hand 07/21/2021    Pain in both hands    Personal history of diseases of the skin and subcutaneous tissue 06/26/2017    History of urticaria    Personal history of diseases of the skin and subcutaneous tissue 09/06/2018    History of contact dermatitis    Personal history of other diseases of the musculoskeletal system and connective tissue 02/06/2020    History of muscle pain    Personal history of other diseases of the nervous system and sense organs 01/05/2017    History of sleep disturbance    Personal history of other diseases of the respiratory system 05/11/2018    History of acute sinusitis    Personal history of other endocrine, nutritional and metabolic disease 04/27/2020    History of estrogen deficiency    Personal history of other specified conditions 03/03/2016    History of epigastric pain    Personal history of other specified conditions 10/25/2022    History of weakness    Personal history of other specified conditions 07/05/2022    History of epigastric pain    Personal history of other specified conditions 10/28/2019    History of gait disorder    Personal history of transient ischemic attack (TIA), and cerebral infarction without residual deficits 12/28/2015    History of stroke    Pleurodynia 12/15/2019    Rib pain on left side    Presence of unspecified artificial hip joint 05/15/2020    Status post revision of total hip replacement    Rash of face 05/22/2023    Renal mass, right 05/22/2023    Rib pain on left side 05/22/2023    Rib pain on right side 05/22/2023    Xray chest    Right upper quadrant pain 12/18/2017    Abdominal pain, RUQ    Rosacea 05/22/2023    Scar adherent 05/22/2023    Scar condition and fibrosis of skin 09/02/2021    Scar conditions and fibrosis of skin 07/02/2021    Scar adherent    Seborrheic dermatitis, unspecified 09/02/2021    Spinal stenosis of lumbar region 07/10/2013    Stroke (Multi)      Trigger finger, acquired 05/22/2023    Trochanteric bursitis, left hip 05/15/2020    Greater trochanteric bursitis of left hip    Ulcerative blepharitis 05/22/2023    Vitamin D deficiency, unspecified 06/09/2016    Hypovitaminosis D    Weakness of both lower extremities 05/22/2023    Wrist stiffness 05/22/2023    Wrist weakness 05/22/2023     Past Surgical History:   Procedure Laterality Date    CARPAL TUNNEL RELEASE Bilateral     EYE SURGERY      HIP SURGERY  12/28/2015    Hip Surgery    HYSTERECTOMY  12/28/2015    Hysterectomy    JOINT REPLACEMENT Left     TKA    MR HEAD ANGIO WO IV CONTRAST  12/29/2012    MR HEAD ANGIO WO IV CONTRAST [facility]    MR NECK ANGIO WO IV CONTRAST  12/29/2012    MR NECK ANGIO WO IV CONTRAST [facility]    TOTAL HIP ARTHROPLASTY  12/28/2015    Hip Replacement     Family History   Problem Relation Name Age of Onset    Diabetes Mother Shawanda Root     Arthritis Mother Shawanda Root     Stroke Mother Shawanda Root     Other (pacemaker) Father      Breast cancer Other grandmother     Breast cancer Sibling       Social History     Tobacco Use    Smoking status: Never    Smokeless tobacco: Never   Vaping Use    Vaping status: Never Used   Substance Use Topics    Alcohol use: Not Currently    Drug use: Never       Physical Exam   ED Triage Vitals   Temperature Heart Rate Respirations BP   01/26/25 1934 01/27/25 0052 01/26/25 1934 01/26/25 1934   37.1 °C (98.8 °F) 80 20 (!) 136/116      Pulse Ox Temp Source Heart Rate Source Patient Position   01/26/25 2000 01/27/25 0052 01/27/25 0052 01/27/25 0052   98 % Temporal Monitor Sitting      BP Location FiO2 (%)     01/27/25 0052 --     Right arm        Physical Exam  Vitals and nursing note reviewed.   Constitutional:       Appearance: Normal appearance.   HENT:      Head: Normocephalic and atraumatic.      Nose: Nose normal.      Mouth/Throat:      Mouth: Mucous membranes are moist.   Eyes:      Extraocular Movements: Extraocular  movements intact.      Pupils: Pupils are equal, round, and reactive to light.   Cardiovascular:      Rate and Rhythm: Normal rate and regular rhythm.   Pulmonary:      Effort: Pulmonary effort is normal.      Breath sounds: Normal breath sounds.   Abdominal:      General: Abdomen is flat.      Palpations: Abdomen is soft.   Musculoskeletal:         General: Swelling present. Normal range of motion.      Cervical back: Normal range of motion.      Comments: Right knee has the bandage present, very minimal erythema diffusely around the right knee, edema present, decreased range of motion of the knee no drainage noted distal pulses 2+   Skin:     General: Skin is warm and dry.      Capillary Refill: Capillary refill takes less than 2 seconds.   Neurological:      General: No focal deficit present.      Mental Status: She is alert.   Psychiatric:         Mood and Affect: Mood normal.           ED Course & MDM   ED Course as of 01/31/25 0642   Sun Jan 26, 2025 2239 Patient was given Dilaudid with minimal relief.  Given Solu-Medrol with minimal relief.  DVT study is negative.  X-ray of the knee shows a large effusion.  Leukocytosis of 17 which is pretty normal for postop.  Patient was due for Percocet a few hours ago so Percocet was given with another milligram of Dilaudid IV. [TP]      ED Course User Index  [TP] Rachel Montilla DO         Diagnoses as of 01/31/25 0642   Acute pain of right knee                 No data recorded                                 Medical Decision Making  Medical Decision Making: Patient was worked up lab work is consistent with postoperative knee.  White count 17 but patient is on steroids.  D-dimer 4800.  Ultrasound study negative and x-ray shows a large effusion spoke with orthopedics who recommends discharge home.  Patient agrees with the plan of care..  Very low suspicion for septic knee I have no indication that this is infected.  Differential includes postop complication,  postop abscess  Considered CT knee but not indicated  [unfilled]     Rachel Montilla D.O.  Emergency Medicine          Procedure  Procedures     Rachel Montilla,   01/31/25 0663

## 2025-02-04 ENCOUNTER — HOME CARE VISIT (OUTPATIENT)
Dept: HOME HEALTH SERVICES | Facility: HOME HEALTH | Age: 80
End: 2025-02-04
Payer: MEDICARE

## 2025-02-04 PROCEDURE — G0151 HHCP-SERV OF PT,EA 15 MIN: HCPCS | Mod: HHH

## 2025-02-04 ASSESSMENT — ENCOUNTER SYMPTOMS
PAIN LOCATION: RIGHT KNEE
PAIN LOCATION - RELIEVING FACTORS: REST, ICE
PAIN LOCATION - PAIN QUALITY: ACHE
LOWEST PAIN SEVERITY IN PAST 24 HOURS: 4/10
PERSON REPORTING PAIN: PATIENT
SUBJECTIVE PAIN PROGRESSION: GRADUALLY IMPROVING
PAIN LOCATION - PAIN FREQUENCY: CONSTANT
HIGHEST PAIN SEVERITY IN PAST 24 HOURS: 7/10
PAIN LOCATION - PAIN SEVERITY: 4/10
PAIN LOCATION - EXACERBATING FACTORS: KNEE FLEXION
PAIN: 1

## 2025-02-06 ENCOUNTER — OFFICE VISIT (OUTPATIENT)
Dept: PRIMARY CARE | Facility: CLINIC | Age: 80
End: 2025-02-06
Payer: MEDICARE

## 2025-02-06 ENCOUNTER — HOME CARE VISIT (OUTPATIENT)
Dept: HOME HEALTH SERVICES | Facility: HOME HEALTH | Age: 80
End: 2025-02-06
Payer: MEDICARE

## 2025-02-06 VITALS
TEMPERATURE: 97.5 F | DIASTOLIC BLOOD PRESSURE: 58 MMHG | RESPIRATION RATE: 18 BRPM | SYSTOLIC BLOOD PRESSURE: 120 MMHG | OXYGEN SATURATION: 99 % | HEART RATE: 75 BPM

## 2025-02-06 VITALS
WEIGHT: 140 LBS | HEIGHT: 60 IN | OXYGEN SATURATION: 97 % | DIASTOLIC BLOOD PRESSURE: 70 MMHG | BODY MASS INDEX: 27.48 KG/M2 | SYSTOLIC BLOOD PRESSURE: 132 MMHG | HEART RATE: 78 BPM

## 2025-02-06 DIAGNOSIS — M25.561 ACUTE PAIN OF RIGHT KNEE: ICD-10-CM

## 2025-02-06 DIAGNOSIS — N18.32 STAGE 3B CHRONIC KIDNEY DISEASE (MULTI): ICD-10-CM

## 2025-02-06 DIAGNOSIS — I10 BENIGN ESSENTIAL HYPERTENSION: Primary | ICD-10-CM

## 2025-02-06 DIAGNOSIS — Z00.00 ROUTINE ADULT HEALTH MAINTENANCE: ICD-10-CM

## 2025-02-06 PROCEDURE — 3075F SYST BP GE 130 - 139MM HG: CPT | Performed by: NURSE PRACTITIONER

## 2025-02-06 PROCEDURE — 1159F MED LIST DOCD IN RCRD: CPT | Performed by: NURSE PRACTITIONER

## 2025-02-06 PROCEDURE — 1160F RVW MEDS BY RX/DR IN RCRD: CPT | Performed by: NURSE PRACTITIONER

## 2025-02-06 PROCEDURE — 1157F ADVNC CARE PLAN IN RCRD: CPT | Performed by: NURSE PRACTITIONER

## 2025-02-06 PROCEDURE — 99213 OFFICE O/P EST LOW 20 MIN: CPT | Performed by: NURSE PRACTITIONER

## 2025-02-06 PROCEDURE — 3078F DIAST BP <80 MM HG: CPT | Performed by: NURSE PRACTITIONER

## 2025-02-06 PROCEDURE — G2211 COMPLEX E/M VISIT ADD ON: HCPCS | Performed by: NURSE PRACTITIONER

## 2025-02-06 PROCEDURE — G0151 HHCP-SERV OF PT,EA 15 MIN: HCPCS | Mod: HHH

## 2025-02-06 SDOH — HEALTH STABILITY: PHYSICAL HEALTH: EXERCISE TYPE: TKA HEP

## 2025-02-06 ASSESSMENT — ENCOUNTER SYMPTOMS
ARTHRALGIAS: 1
COLOR CHANGE: 0
EYE PAIN: 0
DEPRESSION: 0
PAIN LOCATION - RELIEVING FACTORS: REST, ICE, MEDS
DIFFICULTY URINATING: 0
PAIN LOCATION - PAIN SEVERITY: 5/10
SEIZURES: 0
FATIGUE: 0
HIGHEST PAIN SEVERITY IN PAST 24 HOURS: 6/10
PAIN LOCATION - PAIN QUALITY: ACHE
PERSON REPORTING PAIN: PATIENT
BRUISES/BLEEDS EASILY: 0
ADENOPATHY: 0
PAIN LOCATION - PAIN FREQUENCY: CONSTANT
DIZZINESS: 0
ABDOMINAL DISTENTION: 0
LOWEST PAIN SEVERITY IN PAST 24 HOURS: 3/10
LOSS OF SENSATION IN FEET: 0
HEADACHES: 0
PAIN LOCATION - EXACERBATING FACTORS: KNEE FLEXION
CHILLS: 0
WHEEZING: 0
SHORTNESS OF BREATH: 0
OCCASIONAL FEELINGS OF UNSTEADINESS: 0
NAUSEA: 0
ABDOMINAL PAIN: 0
WOUND: 0
BACK PAIN: 1
PAIN LOCATION: RIGHT KNEE
SUBJECTIVE PAIN PROGRESSION: GRADUALLY IMPROVING
TROUBLE SWALLOWING: 0
PALPITATIONS: 0
FEVER: 0
CONSTIPATION: 0
WEAKNESS: 0
MYALGIAS: 0
PAIN: 1
JOINT SWELLING: 0
DIARRHEA: 0
COUGH: 0

## 2025-02-06 ASSESSMENT — ACTIVITIES OF DAILY LIVING (ADL)
HOME_HEALTH_OASIS: 00
OASIS_M1830: 01
AMBULATION ASSISTANCE ON FLAT SURFACES: 1

## 2025-02-06 NOTE — CASE COMMUNICATION
DISCHARGE SUMMARY:    DISCIPLINE: Physical Therapy  DATE OF DISCIPLINE DISCHARGE: 2//6/25  REASON FOR DISCHARGE: GOALS MET  COORDINATION NOTE: PATIENT I IN HEP, I IN USE OF FWW FOR ALL AMB, I STAIRS,I IN USE OF REST, ICE, MEDS FOR PAIN  EVALUATION OF GOALS: ALL GOALS MET. PATIENT HAS 0 TO 93 DEGREES PROM R KNEE  SUMMARY OF CARE PROVIDED: PATIENT INSTRUCTED IN SAFE TRANSFER TECHNIQUE, GAIT TRAINGING WITH FWW, STRENGTH TRAINING, HEP, EMELY NCE TRAINING, FALLS PREVENTION REAINING, PAIN MANAGEMENT, HOME SAFETY EVAL, STAIRS TRAINING.  DISCHARGE INSTRCTIONS GIVEN: CONTINUE WITH HEP TWICE DAILY AS TOLERATED, USE FWW AT ALL TIMES.  SERVICES REMAINING: NONE  NOMNC OBTAINED: YES

## 2025-02-06 NOTE — PROGRESS NOTES
"Subjective   Patient ID: Vicky Govea is a 80 y.o. female who presents for Hypertension (Pt had knee surgery on Jan 13th and now her B/P is going up and down pt is unsure if it is ok or not ) and Follow-up (Med Jardiance ).    Patient seen today due to concerns of hypertension.  Patient recently underwent a total knee replacement with Dr. Richards from orthopedics.  She has been having a hard time with acute pain postoperatively and even had to go to the emergency department for pain control purposes.  She continues on oral Percocet and states that the pain is slowly starting to improve.  She is also now on steroids per orthopedic recommendations.  Patient is now ambulating with a walker and will be starting with outpatient physical therapy next week.  Patient is voicing concerns that her blood pressure is elevated secondary to her acute pain.  She reports that her systolic blood pressure typically is in 120s to 140 range. Patient denies any associated symptoms such as lightheaded nests, dizziness, chest pain/tightness, headaches, blurred vision or other cardiac concerns.   No shortness of breath or chest pain with exertion.    No reported issues with appetite, staying hydrated.  Patient does report IBS-D that only tends to flare if she eats the \"wrong foods \".  She does follow routinely with gastroenterology for IBS and GERD.  Currently, she feels like her bowels are under control. Occasional nocturia reported but otherwise patient denies any urinary complaints.  No significant concerns with mood or insomnia.  Patient lives with her spouse and appears to have a good family support system.  Patient follows routinely for multiple specialists, this includes urology, rheumatology, orthopedic surgery, ophthalmology and gynecology.  Patient has multiple joint/chronic pain issues that are being relatively well managed with specialists.  She is scheduled to undergo a right total knee replacement next month.  Medications " reviewed.  No other acute concerns voiced at this time.       Current Outpatient Medications on File Prior to Visit   Medication Sig Dispense Refill    amLODIPine (Norvasc) 10 mg tablet Take 1 tablet (10 mg) by mouth once daily. 90 tablet 1    atorvastatin (Lipitor) 10 mg tablet TAKE 1 TABLET DAILY AT BEDTIME 90 tablet 3    calcium carbonate-vitamin D3 500 mg-5 mcg (200 unit) tablet Take 1 tablet by mouth once daily.      cholecalciferol (Vitamin D-3) 50 mcg (2,000 unit) capsule Take 1 capsule (50 mcg) by mouth early in the morning..      clopidogrel (Plavix) 75 mg tablet Take 1 tablet (75 mg) by mouth once daily. 90 tablet 3    cranberry fruit 400 mg tablet Take 1 tablet by mouth once daily.      empagliflozin (Jardiance) 10 mg Take 1 tablet (10 mg) by mouth once daily. 90 tablet 3    famotidine (Pepcid) 40 mg tablet Take 1 tablet (40 mg) by mouth as needed at bedtime for heartburn. 90 tablet 2    ferrous sulfate, 325 mg ferrous sulfate, tablet Take 1 tablet (325 mg) by mouth 3 times a week. 3 times weekly with breakfast 36 tablet 3    gabapentin (Neurontin) 100 mg capsule Take 1 capsule (100 mg) by mouth once daily at bedtime. 90 capsule 0    hyoscyamine (Anaspaz, Levsin) 0.125 mg tablet Take 1 tablet (0.125 mg) by mouth every 6 hours if needed for cramping or diarrhea. 360 tablet 0    lisinopril 20 mg tablet Take 1 tablet (20 mg) by mouth 2 times a day. 180 tablet 0    MAGNESIUM GLYCINATE ORAL Take 200 mg by mouth early in the morning. PATIENT HAS MAGNESIUM GLYCINATE IN CABINET NOT MAGNESIUM OXIDE LISTED ON DC MEDS  Indications: vitamin deficiency      magnesium oxide-Mg AA chelate 300 mg capsule Take 1 capsule (300 mg) by mouth once daily.      multivitamin tablet Take 1 tablet by mouth once daily.      saccharomyces boulardii (Florastor) 250 mg capsule Take 1 capsule (250 mg) by mouth once daily.      vit A/vit C/vit E/zinc/copper (PRESERVISION AREDS ORAL) Take 1 tablet by mouth 2 times a day.       No current  facility-administered medications on file prior to visit.       Past Medical History:   Diagnosis Date    Abnormal urine findings 05/22/2023    Acquired spondylolisthesis     Acute kidney injury (CMS-HCC) 05/22/2023    Repeat cbc,cmp    Acute non-recurrent frontal sinusitis 08/16/2023    - augmentin twice a day 7 days  -  Nasal saline, increase fluids  -  hand hygiene and infection prevention discussed  -  Warm compress to sinuses  - humidification  - recommend to eat yogurt twice daily while taking antibiotic or a daily probiotic       Acute pharyngitis due to other specified organisms 05/03/2019    Acute bacterial pharyngitis    Age-related nuclear cataract of left eye 05/22/2023    Age-related nuclear cataract of right eye 05/22/2023    Allergic     Allergic contact dermatitis due to plants, except food 06/11/2018    Contact dermatitis due to poison oak    Allergic reaction to contrast dye 09/12/2023    Angina pectoris 08/16/2023    Arthritis     Arthritis of right knee 05/22/2023    Back pain 09/12/2023    Benign paroxysmal positional vertigo 07/08/2021    Body mass index (BMI) 26.0-26.9, adult 06/17/2021    Body mass index (BMI) of 26.0 to 26.9 in adult    Calculus of kidney 12/28/2015    Recurrent kidney stones    Carpal tunnel syndrome of left wrist 05/22/2023    Carpal tunnel syndrome of right wrist 05/22/2023    Cataract, nuclear sclerotic, both eyes 05/22/2023    Cheilitis 09/02/2021    Chronic epigastric pain 09/12/2023    Chronic kidney disease     Chronic low back pain 05/22/2023    Colitis 09/12/2023    Contusion of right hand 05/22/2023    Contusion of right hand, initial encounter 10/13/2021    Contusion of right hand, initial encounter    Difficulty in walking, not elsewhere classified 03/19/2020    Difficulty walking    Difficulty walking 05/22/2023    Diverticulitis of intestine, part unspecified, without perforation or abscess without bleeding 11/15/2018    Acute diverticulitis    Diverticulosis  of large intestine without perforation or abscess without bleeding 10/22/2021    Diverticulosis of colon    Dorsalgia, unspecified 11/15/2018    Acute back pain    Drusen (degenerative) of macula, bilateral 05/22/2023    Dry eye syndrome of left lacrimal gland 06/08/2020    Dry eye syndrome of left lacrimal gland    Encounter for follow-up examination after completed treatment for conditions other than malignant neoplasm 03/04/2016    Hospital discharge follow-up    Epigastric pain 03/03/2016    Dyspepsia    Esophageal reflux 05/22/2023    Estrogen deficiency 05/22/2023    Eyelid abnormality 05/22/2023    Gait difficulty 05/22/2023    Gastritis 09/12/2023    Gastro-esophageal reflux disease with esophagitis, without bleeding 10/30/2020    Reflux esophagitis    Generalized abdominal pain 11/19/2018    Acute generalized abdominal pain    Hemangioma of skin and subcutaneous tissue 09/02/2021    Hiatal hernia 05/22/2023    Hip arthritis 09/17/2013    History of total left knee replacement 05/22/2023    Hyperlipidemia     Hypermetropia, bilateral 07/08/2022    Hypermetropia of both eyes    Hyperopia of both eyes 05/22/2023    Hypertension     Hypomagnesemia 06/17/2021    Hypomagnesemia    IBS (irritable bowel syndrome)     Idiopathic neuropathy     Inflamed seborrheic keratosis 09/02/2021    Injury of finger of right hand 05/22/2023    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    Lentigines 09/02/2021    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    Lumbar radicular pain 07/10/2013    Macular degeneration     bilaterally    Mass of joint of left wrist 05/22/2023    Melanocytic nevi of trunk 09/02/2021    Melanocytic nevi of unspecified lower limb, including hip 09/02/2021    Melanocytic nevi of unspecified upper limb, including shoulder 09/02/2021    Menopausal symptom 09/12/2023    Mild vitamin D deficiency 05/22/2023    Muscle spasm of back 05/01/2017    Back muscle spasm    Myalgia due to statin 05/22/2023     Neoplasm of uncertain behavior of skin 09/02/2021    Neurogenic 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 05/22/2023    Ocular rosacea 05/22/2023    Ocular rosacea 05/22/2023    Osteoarthritis, hand 05/22/2023    Osteoarthritis, knee 02/13/2015    Osteopenia 09/12/2023    Osteoporosis     Other long term (current) drug therapy 07/11/2022    Encounter for long-term current use of high risk medication    Other nail disorders 10/09/2018    Leukonychia striata    Other reduced mobility 03/19/2020    Impaired mobility and ADLs    Other specified abnormal findings of blood chemistry 10/09/2018    Elevated serum creatinine    Other specified joint disorders, left wrist 02/08/2021    Mass of joint of left wrist    Other specified postprocedural states 09/19/2017    History of colonoscopy    Other symptoms and signs involving the musculoskeletal system 07/16/2021    Wrist weakness    Other symptoms and signs involving the musculoskeletal system 10/28/2019    Weakness of both lower extremities    Overweight 10/26/2021    Overweight with body mass index (BMI) of 26 to 26.9 in adult    Pain due to internal orthopedic prosthetic devices, implants and grafts, initial encounter (CMS-HCC) 01/14/2020    Pain due to total knee replacement, initial encounter    Pain in both hands 05/22/2023    Pain in both wrists 05/22/2023    Pain in left hip 06/25/2021    Pain of left hip joint    Pain in left knee 04/16/2020    Left knee pain    Pain in right hand 07/21/2021    Pain in both hands    Personal history of diseases of the skin and subcutaneous tissue 06/26/2017    History of urticaria    Personal history of diseases of the skin and subcutaneous tissue 09/06/2018    History of contact dermatitis    Personal history of other diseases of the musculoskeletal system and connective tissue 02/06/2020    History of muscle pain    Personal history of other diseases of the nervous system and sense organs 01/05/2017    History of sleep  disturbance    Personal history of other diseases of the respiratory system 05/11/2018    History of acute sinusitis    Personal history of other endocrine, nutritional and metabolic disease 04/27/2020    History of estrogen deficiency    Personal history of other specified conditions 03/03/2016    History of epigastric pain    Personal history of other specified conditions 10/25/2022    History of weakness    Personal history of other specified conditions 07/05/2022    History of epigastric pain    Personal history of other specified conditions 10/28/2019    History of gait disorder    Personal history of transient ischemic attack (TIA), and cerebral infarction without residual deficits 12/28/2015    History of stroke    Pleurodynia 12/15/2019    Rib pain on left side    Presence of unspecified artificial hip joint 05/15/2020    Status post revision of total hip replacement    Rash of face 05/22/2023    Renal mass, right 05/22/2023    Rib pain on left side 05/22/2023    Rib pain on right side 05/22/2023    Xray chest    Right upper quadrant pain 12/18/2017    Abdominal pain, RUQ    Rosacea 05/22/2023    Scar adherent 05/22/2023    Scar condition and fibrosis of skin 09/02/2021    Scar conditions and fibrosis of skin 07/02/2021    Scar adherent    Seborrheic dermatitis, unspecified 09/02/2021    Spinal stenosis of lumbar region 07/10/2013    Stroke (Multi)     Trigger finger, acquired 05/22/2023    Trochanteric bursitis, left hip 05/15/2020    Greater trochanteric bursitis of left hip    Ulcerative blepharitis 05/22/2023    Vitamin D deficiency, unspecified 06/09/2016    Hypovitaminosis D    Weakness of both lower extremities 05/22/2023    Wrist stiffness 05/22/2023    Wrist weakness 05/22/2023        Past Surgical History:   Procedure Laterality Date    CARPAL TUNNEL RELEASE Bilateral     EYE SURGERY      HIP SURGERY  12/28/2015    Hip Surgery    HYSTERECTOMY  12/28/2015    Hysterectomy    JOINT REPLACEMENT Left      TKA    MR HEAD ANGIO WO IV CONTRAST  12/29/2012    MR HEAD ANGIO WO IV CONTRAST [facility]    MR NECK ANGIO WO IV CONTRAST  12/29/2012    MR NECK ANGIO WO IV CONTRAST [facility]    TOTAL HIP ARTHROPLASTY  12/28/2015    Hip Replacement        Family History   Problem Relation Name Age of Onset    Diabetes Mother Shawanda Root     Arthritis Mother Shawanda Root     Stroke Mother Shawanda Ramirez     Other (pacemaker) Father      Breast cancer Other grandmother     Breast cancer Sibling          Review of Systems   Constitutional:  Negative for chills, fatigue and fever.   HENT:  Negative for dental problem and trouble swallowing.    Eyes:  Negative for pain and visual disturbance.        Wears glasses; Positie for cataracts   Respiratory:  Negative for cough, shortness of breath and wheezing.    Cardiovascular:  Negative for chest pain, palpitations and leg swelling.   Gastrointestinal:  Negative for abdominal distention, abdominal pain, constipation, diarrhea and nausea.        Positive for IBS-D   Endocrine: Negative for cold intolerance and heat intolerance.   Genitourinary:  Negative for difficulty urinating.        Positive for nocturia    Musculoskeletal:  Positive for arthralgias and back pain. Negative for gait problem, joint swelling and myalgias.        Positive for polyarthalgia. See HPI   Skin:  Negative for color change, pallor, rash and wound.   Allergic/Immunologic: Negative for environmental allergies and food allergies.   Neurological:  Negative for dizziness, seizures, weakness and headaches.   Hematological:  Negative for adenopathy. Does not bruise/bleed easily.   Psychiatric/Behavioral:  Negative for behavioral problems.    All other systems reviewed and are negative.      Objective   /70   Pulse 78   Ht 1.524 m (5')   Wt 63.5 kg (140 lb)   LMP  (LMP Unknown)   SpO2 97%   BMI 27.34 kg/m²     Physical Exam  Constitutional:       General: She is not in acute distress.      Appearance: Normal appearance. She is not toxic-appearing.   HENT:      Head: Normocephalic and atraumatic.      Right Ear: External ear normal.      Left Ear: External ear normal.      Nose: Nose normal.      Mouth/Throat:      Mouth: Mucous membranes are moist.      Pharynx: Oropharynx is clear.   Eyes:      Extraocular Movements: Extraocular movements intact.      Conjunctiva/sclera: Conjunctivae normal.   Cardiovascular:      Rate and Rhythm: Normal rate and regular rhythm.      Pulses: Normal pulses.      Heart sounds: Normal heart sounds. No murmur heard.  Pulmonary:      Effort: Pulmonary effort is normal.      Breath sounds: Normal breath sounds. No wheezing.   Abdominal:      General: Bowel sounds are normal.      Palpations: Abdomen is soft.   Musculoskeletal:         General: No swelling.      Cervical back: Normal range of motion and neck supple.   Skin:     General: Skin is warm and dry.   Neurological:      General: No focal deficit present.      Mental Status: She is alert and oriented to person, place, and time. Mental status is at baseline.      Cranial Nerves: No cranial nerve deficit.      Motor: No weakness.   Psychiatric:         Mood and Affect: Mood normal.         Behavior: Behavior normal.         Thought Content: Thought content normal.         Judgment: Judgment normal.         Assessment/Plan   Problem List Items Addressed This Visit             ICD-10-CM    Benign essential hypertension - Primary I10     Patient to continue current medication regiment.  Vital signs appear to be relatively stable at home given acute pain and steroid use..   She is also to continue to monitor her blood pressures routinely at home.  She is to notify provider for any persistent elevations in systolic blood pressure or symptomatic hypotension.  Patient to continue with low-sodium, heart healthy diet.  Provider to be notified for any new cardiac concerns.         Relevant Orders    CBC and Auto Differential     Comprehensive Metabolic Panel    Acute pain of right knee M25.561     Patient recently underwent a right total knee replacement with Dr. Richards from orthopedics.  Pain appears to be finally improving and patient is scheduled to begin outpatient physical therapy services next week.         Routine adult health maintenance Z00.00     Most recent blood work reviewed.  Will continue to monitor as appropriate.  -Most recent colonoscopy completed in may, 2023 with recommendations that no additional screening colonoscopies are indicated given age and lack of symptoms  -DEXA completed in 2023 and was positive for osteoporosis.  Patient's previous provider recommended increased calcium and vitamin D supplements in addition to weightbearing activities.  Will rescreen patient in 2025         Stage 3 chronic kidney disease (Multi) N18.30     Patient encouraged to initiate Jardiance as this medication has now been covered with the help of clinical pharmacy team..  Patient encouraged to stay hydrated and maintain scheduled follow-up with nephrology for continued evaluation and treatment recommendations.

## 2025-02-07 ENCOUNTER — APPOINTMENT (OUTPATIENT)
Dept: PHYSICAL THERAPY | Facility: CLINIC | Age: 80
End: 2025-02-07
Payer: MEDICARE

## 2025-02-10 NOTE — ASSESSMENT & PLAN NOTE
Patient to continue current medication regiment.  Vital signs appear to be relatively stable at home given acute pain and steroid use..   She is also to continue to monitor her blood pressures routinely at home.  She is to notify provider for any persistent elevations in systolic blood pressure or symptomatic hypotension.  Patient to continue with low-sodium, heart healthy diet.  Provider to be notified for any new cardiac concerns.

## 2025-02-10 NOTE — ASSESSMENT & PLAN NOTE
Patient recently underwent a right total knee replacement with Dr. Richards from orthopedics.  Pain appears to be finally improving and patient is scheduled to begin outpatient physical therapy services next week.

## 2025-02-10 NOTE — ASSESSMENT & PLAN NOTE
Patient encouraged to initiate Jardiance as this medication has now been covered with the help of clinical pharmacy team..  Patient encouraged to stay hydrated and maintain scheduled follow-up with nephrology for continued evaluation and treatment recommendations.

## 2025-02-19 ENCOUNTER — APPOINTMENT (OUTPATIENT)
Dept: PHYSICAL THERAPY | Facility: CLINIC | Age: 80
End: 2025-02-19
Payer: MEDICARE

## 2025-02-20 ENCOUNTER — TELEPHONE (OUTPATIENT)
Dept: GASTROENTEROLOGY | Facility: CLINIC | Age: 80
End: 2025-02-20
Payer: MEDICARE

## 2025-02-20 DIAGNOSIS — I10 BENIGN ESSENTIAL HYPERTENSION: ICD-10-CM

## 2025-02-20 DIAGNOSIS — K21.9 GASTROESOPHAGEAL REFLUX DISEASE, UNSPECIFIED WHETHER ESOPHAGITIS PRESENT: Primary | ICD-10-CM

## 2025-02-20 RX ORDER — LISINOPRIL 20 MG/1
20 TABLET ORAL 2 TIMES DAILY
Qty: 180 TABLET | Refills: 0 | Status: SHIPPED | OUTPATIENT
Start: 2025-02-20

## 2025-02-20 RX ORDER — DEXLANSOPRAZOLE 30 MG/1
30 CAPSULE, DELAYED RELEASE ORAL DAILY
Qty: 30 CAPSULE | Refills: 11 | Status: SHIPPED | OUTPATIENT
Start: 2025-02-20

## 2025-02-21 ENCOUNTER — APPOINTMENT (OUTPATIENT)
Dept: PHYSICAL THERAPY | Facility: CLINIC | Age: 80
End: 2025-02-21
Payer: MEDICARE

## 2025-02-25 ENCOUNTER — APPOINTMENT (OUTPATIENT)
Dept: PHYSICAL THERAPY | Facility: CLINIC | Age: 80
End: 2025-02-25
Payer: MEDICARE

## 2025-02-25 ENCOUNTER — APPOINTMENT (OUTPATIENT)
Dept: PHARMACY | Facility: HOSPITAL | Age: 80
End: 2025-02-25
Payer: MEDICARE

## 2025-02-25 DIAGNOSIS — N18.30 STAGE 3 CHRONIC KIDNEY DISEASE, UNSPECIFIED WHETHER STAGE 3A OR 3B CKD (MULTI): Primary | ICD-10-CM

## 2025-02-25 NOTE — Clinical Note
Appears stable on Jardiance for now- will establish with Dr. Romero next month. Will follow-up in ~11 months for  PAP renewal.

## 2025-02-25 NOTE — PROGRESS NOTES
Outpatient Clinical Pharmacy Visit  Vicky Govea is a 80 y.o. female who was referred to the ambulatory Clinical Pharmacy Team for their Chronic Kidney Disease.    Referring Provider: JOHN Anedrson    Medication Access  Cost barriers: Yes- cost of Jardiance  Enrolled in  PAP: Yes, expires 1/27/2026 (Jardiance)  Manages own medication: Yes  Transportation to the pharmacy: Yes  Frequency of missed doses: Rare    Last Visit/Interim  At last visit with Vicky, started Jardiance 10 mg daily for renal benefit.     In the interim, she was approved for  PAP coverage of Jardiance.    CKD ASSESSMENT  Believes it has been approximately 10 years since she began to see some level of kidney decline.     Today, Vicky notes that since starting Jardiance 10 mg daily she has experienced a slight increase in the frequency of urination, but denies any pain or burning with urination. Also denies any itchiness of skin. Also endorses a slight increase in loose stool, although notes that it is not overly troublesome.     Reinforced the importance of maintaining hydration while on Jardiance, and also the importance of following up with nephrologist Dr. Romero next month. Dr. Romero will be able to review renal bloodwork and also discuss diet recommendations with Vicky in-depth.     Renal Function  eGFR: 25 mL/min/1.73 m2 as of 1/26/25  CrCl: 18.9 mL/min (calculated) as of 1/26/25 labs  Urine albumin/creatinine ratio: Unable to calculate as of 11/21/24  CKD Stage: Stage 4  Medications  SGLT2i? Yes- Jardiance 10 mg daily  Kerendia? No     Renal Dosing  Are all medications dosed appropriately? Yes     Hypertension  BP: 120s/50s-60s  Medications:   Amlodipine 10 mg daily  Lisinopril 20 mg daily     Diabetes  A1C: 5.4%  Medications:  None     Hyperlipidemia  LDL: 101 mg/dL as of 10/10/24  Triglycerides 153 mg/dL as of 10/10/24  On statin? Yes, describe: atorvastatin 10 mg daily- started ~3 years ago  Other medications:  What Is The Reason For Today's Visit?: the risk of recurrence, and the development of new lesions No       Laboratory Results  Lab Results   Component Value Date    BILITOT 0.5 01/26/2025    CALCIUM 10.3 01/26/2025    CO2 27 01/26/2025    CL 99 01/26/2025    CREATININE 1.98 (H) 01/26/2025    GLUCOSE 96 01/26/2025    ALKPHOS 68 01/26/2025    K 4.1 01/26/2025    PROT 7.2 01/26/2025     01/26/2025    AST 16 01/26/2025    ALT 14 01/26/2025    BUN 45 (H) 01/26/2025    ANIONGAP 17 01/26/2025    MG 1.97 01/26/2025    PHOS 3.8 12/23/2024    ALBUMIN 3.9 01/26/2025    GFRF 27 (A) 08/10/2023    GFRMALE CANCELED 05/10/2023    EGFR 25 (L) 01/26/2025     Lab Results   Component Value Date    TRIG 153 (H) 10/10/2024    CHOL 185 10/10/2024    HDL 53.4 10/10/2024     Lab Results   Component Value Date    HGBA1C 5.4 10/10/2024       Assessment/Plan   Problem List Items Addressed This Visit       Stage 3 chronic kidney disease (Multi) - Primary    Relevant Orders    Referral to Clinical Pharmacy     Assessment  Vicky's renal function currently appears to be relatively stable and she is overall tolerating Jardiance 10 mg daily at this time.   Encouraged her to follow-up for a new-patient visit with Dr. Romero next month for further renal management.     Plan/Changes   Continue all meds under the continuation of care with the referring provider and clinical pharmacy team  Enroll Vicky in autofill with [facility]    Next PCP Follow-Up  TBD    Next Clinical Pharmacy Follow-Up  ~11 months for  PAP renewal of Jardiance       Celia LawlerD     Verbal consent to manage patient's drug therapy was obtained from the patient. They were informed they may decline to participate or withdraw from participation in pharmacy services at any time.

## 2025-03-01 DIAGNOSIS — Z79.899 LONG-TERM USE OF PLAQUENIL: ICD-10-CM

## 2025-03-03 RX ORDER — HYDROXYCHLOROQUINE SULFATE 200 MG/1
TABLET, FILM COATED ORAL DAILY
Qty: 90 TABLET | Refills: 3 | Status: SHIPPED | OUTPATIENT
Start: 2025-03-03

## 2025-03-05 ENCOUNTER — APPOINTMENT (OUTPATIENT)
Dept: PRIMARY CARE | Facility: CLINIC | Age: 80
End: 2025-03-05
Payer: MEDICARE

## 2025-03-06 ENCOUNTER — APPOINTMENT (OUTPATIENT)
Dept: UROLOGY | Facility: CLINIC | Age: 80
End: 2025-03-06
Payer: MEDICARE

## 2025-03-11 LAB
ALBUMIN SERPL-MCNC: 4.1 G/DL (ref 3.6–5.1)
ALP SERPL-CCNC: 57 U/L (ref 37–153)
ALT SERPL-CCNC: 10 U/L (ref 6–29)
ANION GAP SERPL CALCULATED.4IONS-SCNC: 12 MMOL/L (CALC) (ref 7–17)
AST SERPL-CCNC: 16 U/L (ref 10–35)
BASOPHILS # BLD AUTO: 41 CELLS/UL (ref 0–200)
BASOPHILS NFR BLD AUTO: 0.5 %
BILIRUB SERPL-MCNC: 0.4 MG/DL (ref 0.2–1.2)
BUN SERPL-MCNC: 27 MG/DL (ref 7–25)
CALCIUM SERPL-MCNC: 10 MG/DL (ref 8.6–10.4)
CHLORIDE SERPL-SCNC: 104 MMOL/L (ref 98–110)
CO2 SERPL-SCNC: 26 MMOL/L (ref 20–32)
CREAT SERPL-MCNC: 1.61 MG/DL (ref 0.6–0.95)
EGFRCR SERPLBLD CKD-EPI 2021: 32 ML/MIN/1.73M2
EOSINOPHIL # BLD AUTO: 81 CELLS/UL (ref 15–500)
EOSINOPHIL NFR BLD AUTO: 1 %
ERYTHROCYTE [DISTWIDTH] IN BLOOD BY AUTOMATED COUNT: 12.7 % (ref 11–15)
GLUCOSE SERPL-MCNC: 80 MG/DL (ref 65–139)
HCT VFR BLD AUTO: 35.1 % (ref 35–45)
HGB BLD-MCNC: 11.4 G/DL (ref 11.7–15.5)
LYMPHOCYTES # BLD AUTO: 1693 CELLS/UL (ref 850–3900)
LYMPHOCYTES NFR BLD AUTO: 20.9 %
MCH RBC QN AUTO: 29.9 PG (ref 27–33)
MCHC RBC AUTO-ENTMCNC: 32.5 G/DL (ref 32–36)
MCV RBC AUTO: 92.1 FL (ref 80–100)
MONOCYTES # BLD AUTO: 818 CELLS/UL (ref 200–950)
MONOCYTES NFR BLD AUTO: 10.1 %
NEUTROPHILS # BLD AUTO: 5468 CELLS/UL (ref 1500–7800)
NEUTROPHILS NFR BLD AUTO: 67.5 %
PLATELET # BLD AUTO: 430 THOUSAND/UL (ref 140–400)
PMV BLD REES-ECKER: 10.3 FL (ref 7.5–12.5)
POTASSIUM SERPL-SCNC: 4.6 MMOL/L (ref 3.5–5.3)
PROT SERPL-MCNC: 6.8 G/DL (ref 6.1–8.1)
RBC # BLD AUTO: 3.81 MILLION/UL (ref 3.8–5.1)
SODIUM SERPL-SCNC: 142 MMOL/L (ref 135–146)
WBC # BLD AUTO: 8.1 THOUSAND/UL (ref 3.8–10.8)

## 2025-03-18 ENCOUNTER — APPOINTMENT (OUTPATIENT)
Dept: OPHTHALMOLOGY | Facility: CLINIC | Age: 80
End: 2025-03-18
Payer: MEDICARE

## 2025-03-18 DIAGNOSIS — H25.13 AGE-RELATED NUCLEAR CATARACT OF BOTH EYES: Primary | ICD-10-CM

## 2025-03-18 DIAGNOSIS — H35.3132 INTERMEDIATE STAGE NONEXUDATIVE AGE-RELATED MACULAR DEGENERATION OF BOTH EYES: ICD-10-CM

## 2025-03-18 DIAGNOSIS — Z79.899 LONG-TERM USE OF PLAQUENIL: ICD-10-CM

## 2025-03-18 PROCEDURE — 99213 OFFICE O/P EST LOW 20 MIN: CPT

## 2025-03-18 PROCEDURE — 92083 EXTENDED VISUAL FIELD XM: CPT

## 2025-03-18 PROCEDURE — 92134 CPTRZ OPH DX IMG PST SGM RTA: CPT

## 2025-03-18 ASSESSMENT — EXTERNAL EXAM - LEFT EYE: OS_EXAM: NORMAL

## 2025-03-18 ASSESSMENT — VISUAL ACUITY
OS_CC: 20/40
METHOD: SNELLEN - LINEAR
OD_CC: 20/40

## 2025-03-18 ASSESSMENT — SLIT LAMP EXAM - LIDS
COMMENTS: GOOD POSITION, TRACE MGD
COMMENTS: GOOD POSITION, TRACE MGD

## 2025-03-18 ASSESSMENT — TONOMETRY
IOP_METHOD: TONOPEN
OD_IOP_MMHG: 12
OS_IOP_MMHG: 15

## 2025-03-18 ASSESSMENT — ENCOUNTER SYMPTOMS
EYES NEGATIVE: 1
NEUROLOGICAL NEGATIVE: 0
ENDOCRINE NEGATIVE: 0
ALLERGIC/IMMUNOLOGIC NEGATIVE: 0
MUSCULOSKELETAL NEGATIVE: 0
GASTROINTESTINAL NEGATIVE: 0
CARDIOVASCULAR NEGATIVE: 0
PSYCHIATRIC NEGATIVE: 0
HEMATOLOGIC/LYMPHATIC NEGATIVE: 0
RESPIRATORY NEGATIVE: 0
CONSTITUTIONAL NEGATIVE: 0

## 2025-03-18 ASSESSMENT — CUP TO DISC RATIO
OD_RATIO: .3
OS_RATIO: .3

## 2025-03-18 ASSESSMENT — EXTERNAL EXAM - RIGHT EYE: OD_EXAM: NORMAL

## 2025-03-18 NOTE — PROGRESS NOTES
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 No IRF or SRF    - Left eye (OS): Normal foveal contour, medium to large druse, intact outer retinal bands. No IRF or SRF    #Plan#:   1. AREDS 2 BID    2. Recommend UV protection, diet modification (eat green leafy vegetables, fish, lower meat consumption)  3. Amsler grid follow up  4. Observe  5. RTC in 6 months       Long-term use of [id number]  - For treatment of lupus Duration: 8 years  - Dose: 200 mg/day  - No renal or liver disease  - No evidence of pre-existing maculopathy  - No visual complaints    - OCT: no signs of plaquenil toxicity  - [facility] (10-2)   - OD: reliable, no signs of central loss   -OS: unreliable (high FN error): nonspecific central loss    Impression:  - No evidence of hydroxychloroquine toxicity on exam today, will repeat testing in 12 months    SLE  - Observe  - RTC with [employer] for screening    #Nuclear Cataracts OU  - Following with Dr Rodríguez  - not yet candidate based on [facility] and glare results  - monitor

## 2025-03-25 ENCOUNTER — APPOINTMENT (OUTPATIENT)
Dept: NEPHROLOGY | Facility: CLINIC | Age: 80
End: 2025-03-25
Payer: MEDICARE

## 2025-03-25 VITALS
HEIGHT: 60 IN | HEART RATE: 78 BPM | OXYGEN SATURATION: 96 % | BODY MASS INDEX: 24.15 KG/M2 | WEIGHT: 123 LBS | TEMPERATURE: 97.3 F | DIASTOLIC BLOOD PRESSURE: 76 MMHG | SYSTOLIC BLOOD PRESSURE: 148 MMHG

## 2025-03-25 DIAGNOSIS — N18.9 CHRONIC KIDNEY DISEASE, UNSPECIFIED CKD STAGE: ICD-10-CM

## 2025-03-25 DIAGNOSIS — N18.32 STAGE 3B CHRONIC KIDNEY DISEASE (MULTI): Primary | ICD-10-CM

## 2025-03-25 DIAGNOSIS — E83.42 HYPOMAGNESEMIA: ICD-10-CM

## 2025-03-25 DIAGNOSIS — I10 BENIGN ESSENTIAL HYPERTENSION: ICD-10-CM

## 2025-03-25 PROCEDURE — 3077F SYST BP >= 140 MM HG: CPT | Performed by: INTERNAL MEDICINE

## 2025-03-25 PROCEDURE — 99205 OFFICE O/P NEW HI 60 MIN: CPT | Performed by: INTERNAL MEDICINE

## 2025-03-25 PROCEDURE — G2211 COMPLEX E/M VISIT ADD ON: HCPCS | Performed by: INTERNAL MEDICINE

## 2025-03-25 PROCEDURE — 3078F DIAST BP <80 MM HG: CPT | Performed by: INTERNAL MEDICINE

## 2025-03-25 PROCEDURE — 1159F MED LIST DOCD IN RCRD: CPT | Performed by: INTERNAL MEDICINE

## 2025-03-25 PROCEDURE — 1157F ADVNC CARE PLAN IN RCRD: CPT | Performed by: INTERNAL MEDICINE

## 2025-03-25 NOTE — PATIENT INSTRUCTIONS
Dear Vicky-it was nice meeting you nephrology clinic today discuss the following    # Chronic kidney disease stage IIIb-baseline serum creatinine 30%.  Continue Jardiance and lisinopril.  Today we discussed appropriate hydration.  Limit salt intake    # Well-controlled hypertension-continue amlodipine 10 mg, lisinopril 20 mg twice daily    # Lupus-under control with no activity.  Currently on Plaquenil.  No signs of renal involvement    # Currently you take magnesium supplement-will monitor magnesium levels    No change in medication today  Follow-up in 6 months with repeat blood work and urinalysis prior to next visit    Jorge Romero MD, MS, [employer]   Clinical  - [employer] [facility]   Nephrologist - [facility]

## 2025-03-25 NOTE — PROGRESS NOTES
Subjective       Vicky Govea is a 80 y.o. female who has past medical history of well-controlled hypertension, carotid artery stenosis, quiescent lupus was coming to see me today initial consultation for CKD management per PCP    Vicky came today with her .  No kidney related complaints or concerns.  Follows appropriate hydration.  No lower urinary tract symptoms.  No leg swelling or shortness of breath.  No skin rash.  No foam or bubbles in the urine.  No significant NSAID use at home.  Blood pressure is reported to be in good control.  No history of diabetes.  She is aware of history of chronic kidney disease.  She reports well-controlled blood pressure at home average reading 120/70.  Previous to medications.  Reviewing blood work showed overall relatively stable serum creatinine 1.5-2, GFR 25-35.  Normal electrolytes.  Today we discussed chronic kidney disease stages, prognosis, management.  Will continue RAAS inhibitors and SGL 2 inhibitors for now (started Jardiance a month ago).  Currently no protein leak in the urine was negative spot test ACR.  Will continue to monitor kidney function      Objective   /76 (BP Location: Left arm, Patient Position: Standing, BP Cuff Size: Adult)   Pulse 78   Temp 36.3 °C (97.3 °F)   Ht 1.524 m (5')   Wt 55.8 kg (123 lb)   LMP  (LMP Unknown)   SpO2 96%   BMI 24.02 kg/m²   Wt Readings from Last 3 Encounters:   03/25/25 55.8 kg (123 lb)   02/06/25 63.5 kg (140 lb)   01/26/25 63.5 kg (140 lb)       Physical Exam    General appearance: no distress awake and alert on room air, euvolemic on exam  Eyes: non-icteric  HEENT: atrumatic head, PEERLA, moist mucosa  Skin: no apparent rash  Heart: NSR, S1, S2 normal, no murmur or gallop  Lungs: Symmetrical expansion,CTA bilat no wheezing/crackles  Abdomen: soft, nt/nd, obese  Extremities: no edema bilat  Neuro: No FND,asterixis, no focal deficits noticed        Review of Systems     Constitutional: no fever, no  "chills, no recent weight gain and no recent weight loss.   Eyes: no blurred vision and no diplopia.   ENT: no hearing loss, no earache, no sore throat, no swollen glands in the neck and no nasal discharge.   Cardiovascular: no chest pain, no palpitations and no lower extremity edema.   Respiratory: no shortness of breath, no chronic cough and no shortness of breath during exertion.   Gastrointestinal: no abdominal pain, no constipation, no heartburn, no vomiting, no bloody stools and no change in bowel movements.   Genitourinary: no dysuria and no hematuria.   Musculoskeletal: no arthralgias and no myalgias.   Skin: no rashes and no skin lesions.   Neurological: no headaches and no dizziness.   Psychiatric: no confusion, no depression and no anxiety.   Endocrine: no heat intolerance, no cold intolerance, appetite not increased, no thyroid disorder, no increased urinary frequency and no dry skin.   Hematologic/Lymphatic: does not bleed easily and does not bruise easily.   All other systems have been reviewed and are negative for complaint.         Data Review                   No results found for: \"URICACID\"        Lab Results   Component Value Date    HGBA1C 5.4 10/10/2024                 No lab exists for component: \"CR\", \"PHOSPHORUS\"        Albumin/Creatinine Ratio   Date Value Ref Range Status   11/21/2024   Final     Comment:     One or more analytes used in this calculation is outside of the analytical measurement range. Calculation cannot be performed.            RFP  Recent Labs     03/11/25  1211 01/26/25  1943 12/23/24  1424 11/21/24  1020 10/18/24  1226 10/10/24  1012 04/15/24  1456 05/22/23  1552 05/10/23  0239 05/09/23  0705 09/18/18  1341 09/16/18  0558 09/15/18  0550    139 141  140 145 143 141 142   < > CANCELED 139   < > 141 136   K 4.6 4.1 4.2  4.3 4.4 4.1 4.3 4.1   < > CANCELED 4.1   < > 3.7 3.7    99 105  105 103 104 103 103   < > CANCELED 106   < > 112* 105   CO2 26 27 27  27 29 " 27 25 27   < > CANCELED 24   < > 24 24   BUN 27* 45* 45*  44* 28* 28* 31* 20   < > CANCELED 27*   < > 16 26*   CREATININE 1.61* 1.98* 1.88*  1.83* 1.72* 1.65* 1.69* 1.10*   < > CANCELED 1.42*   < > 1.60* 2.35*   GLUCOSE 80 96 129*  126* 82 102* 92 88   < > CANCELED 72*   < > 114* 127*   CALCIUM 10.0 10.3 9.5  9.6 10.1 9.1 10.2 9.6   < > CANCELED 8.9   < > 8.3* 8.3*   PHOS  --   --  3.8  --   --   --   --   --  CANCELED 3.4  --  1.8* 2.9   EGFR 32* 25* 27*  28* 30* 31* 31* 51*  --   --   --   --   --   --    ANIONGAP 12 17 13  12 17 16 17 16   < > CANCELED 13   < > 9* 11    < > = values in this interval not displayed.        Urineanalysis  Recent Labs     05/08/23 1818 07/11/22  1023 02/06/20  1040 08/20/19 0725 09/14/18  1025   COLORU YELLOW  --  YELLOW YELLOW YELLOW   APPEARANCEU CLEAR  --  HAZY HAZY CLEAR   SPECGRAVU 1.011  --  1.010 1.013 1.005   PATRICIA 5.0  --  7.0 6.0 6.0   PROTUR NEGATIVE 11 7  NEGATIVE NEGATIVE NEGATIVE   GLUCOSEU NEGATIVE  --  NEGATIVE NEGATIVE NEGATIVE   BLOODU NEGATIVE  --  NEGATIVE NEGATIVE NEGATIVE   KETONESU NEGATIVE  --  NEGATIVE NEGATIVE NEGATIVE   BILIRUBINU NEGATIVE  --  NEGATIVE NEGATIVE NEGATIVE   NITRITEU NEGATIVE  --  NEGATIVE NEGATIVE NEGATIVE   LEUKOCYTESU MODERATE(2+)*  --  SMALL(1+)* SMALL(1+)* LARGE(3+)*       Urine Electrolytes  Recent Labs     11/21/24  1205 05/08/23 1818 07/11/22  1023 02/06/20  1040 08/20/19  0725 09/14/18  1025   CREATU 98.8  --  97.0 79.7  --   --    PROTUR  --  NEGATIVE 11 7  NEGATIVE NEGATIVE NEGATIVE   UTPCR  --   --  0.11 0.09  --   --    ALBUMINUR <7.0  --   --   --   --   --         Urine Micro  Recent Labs     05/08/23  1818 02/06/20  1040 08/20/19  0725 09/14/18  1025   WBCU 16* 8* 9* 0-5   RBCU <1 1 1 0-5   HYALCASTU 1+*  --  2+*  --    SQUAMEPIU <1 1 2 1+   BACTERIAU 1+*  --  1+*  --    MUCUSU FEW  --  FEW  --         Iron  Recent Labs     12/23/24  1424   IRON 55   TIBC 321   IRONSAT 17*   FERRITIN 83          Current Outpatient  Medications on File Prior to Visit   Medication Sig Dispense Refill    amLODIPine (Norvasc) 10 mg tablet Take 1 tablet (10 mg) by mouth once daily. 90 tablet 1    atorvastatin (Lipitor) 10 mg tablet TAKE 1 TABLET DAILY AT BEDTIME 90 tablet 3    calcium carbonate-vitamin D3 500 mg-5 mcg (200 unit) tablet Take 1 tablet by mouth once daily.      cholecalciferol (Vitamin D-3) 50 mcg (2,000 unit) capsule Take 1 capsule (50 mcg) by mouth early in the morning..      clopidogrel (Plavix) 75 mg tablet Take 1 tablet (75 mg) by mouth once daily. 90 tablet 3    cranberry fruit 400 mg tablet Take 1 tablet by mouth once daily.      dexlansoprazole (Dexilant) 30 mg DR capsule Take 1 capsule (30 mg) by mouth once daily. Do not crush or chew. (Patient taking differently: Take 1 capsule (30 mg) by mouth if needed (gerd). Do not crush or chew.  Take 30 or 60mg PRN) 30 capsule 11    empagliflozin (Jardiance) 10 mg Take 1 tablet (10 mg) by mouth once daily. 90 tablet 3    ferrous sulfate, 325 mg ferrous sulfate, tablet Take 1 tablet (325 mg) by mouth 3 times a week. 3 times weekly with breakfast 36 tablet 3    gabapentin (Neurontin) 100 mg capsule Take 1 capsule (100 mg) by mouth once daily at bedtime. (Patient taking differently: Take 1 capsule (100 mg) by mouth once daily at bedtime. Takes occasionally) 90 capsule 0    hydroxychloroquine (Plaquenil) 200 mg tablet TAKE 1 TABLET ONCE DAILY 90 tablet 3    lisinopril 20 mg tablet Take 1 tablet (20 mg) by mouth 2 times a day. 180 tablet 0    MAGNESIUM GLYCINATE ORAL Take 200 mg by mouth early in the morning. PATIENT HAS MAGNESIUM GLYCINATE IN CABINET NOT MAGNESIUM OXIDE LISTED ON DC MEDS  Indications: vitamin deficiency      magnesium oxide-Mg AA chelate 300 mg capsule Take 1 capsule (300 mg) by mouth once daily.      multivitamin tablet Take 1 tablet by mouth once daily.      vit A/vit C/vit E/zinc/copper (PRESERVISION AREDS ORAL) Take 1 tablet by mouth 2 times a day.      [DISCONTINUED]  famotidine (Pepcid) 40 mg tablet Take 1 tablet (40 mg) by mouth as needed at bedtime for heartburn. 90 tablet 2    [DISCONTINUED] hyoscyamine (Anaspaz, Levsin) 0.125 mg tablet Take 1 tablet (0.125 mg) by mouth every 6 hours if needed for cramping or diarrhea. 360 tablet 0    [DISCONTINUED] saccharomyces boulardii (Florastor) 250 mg capsule Take 1 capsule (250 mg) by mouth once daily.       No current facility-administered medications on file prior to visit.           Assessment and Plan       Vicky Govea  is a 80 y.o. 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  The kidney stones or masses  -Within normal electrolytes, no significant acidosis  -Currently on RAAS inhibitors, SGL inhibitors-will continue    # Well-controlled hypertension  -Current medication amlodipine 10 mg, lisinopril 20 mg twice daily-no changes    # No history of diabetes    # CKD-MBD.  Will check PTH, vitamin D, calcium, phosphorus.  Currently she is magnesium supplements-will monitor magnesium levels    # No significant anemia-continue to monitor    # CKD-CVS.  No history of coronary artery disease  -Currently on statins, RAAS inhibitors, SGL 2 inhibitors    # Quiescent lupus with negative SUHA, C3, C4-continue to follow with rheumatology  -Currently on Plaquenil  -No evidence for renal involvement      #Others  - No NSAIDs, no contrast as possible. If to be done- we recommend holding ACEi/ARBS/diuretics 24 hrs prior to contrast exposure and ensure appropriate hydration   - Ensure well hydration  - Limit salt in diet  - No smoking    Patient received CKD education and  counselling  Follow-up in 6 months with repeat blood work urinalysis prior to visit    Jorge Romero MD, MS, [employer]   Clinical  - [facility] [employer]   Nephrologist - [facility]

## 2025-04-08 ENCOUNTER — APPOINTMENT (OUTPATIENT)
Dept: NEPHROLOGY | Facility: CLINIC | Age: 80
End: 2025-04-08
Payer: MEDICARE

## 2025-04-14 PROCEDURE — RXMED WILLOW AMBULATORY MEDICATION CHARGE

## 2025-04-15 ENCOUNTER — PHARMACY VISIT (OUTPATIENT)
Dept: PHARMACY | Facility: CLINIC | Age: 80
End: 2025-04-15
Payer: COMMERCIAL

## 2025-05-01 ENCOUNTER — APPOINTMENT (OUTPATIENT)
Dept: RHEUMATOLOGY | Facility: CLINIC | Age: 80
End: 2025-05-01
Payer: MEDICARE

## 2025-05-08 ENCOUNTER — APPOINTMENT (OUTPATIENT)
Dept: UROLOGY | Facility: CLINIC | Age: 80
End: 2025-05-08
Payer: MEDICARE

## 2025-05-09 ENCOUNTER — APPOINTMENT (OUTPATIENT)
Dept: OPHTHALMOLOGY | Facility: CLINIC | Age: 80
End: 2025-05-09
Payer: MEDICARE

## 2025-05-09 ENCOUNTER — OFFICE VISIT (OUTPATIENT)
Dept: OPHTHALMOLOGY | Facility: CLINIC | Age: 80
End: 2025-05-09
Payer: MEDICARE

## 2025-05-09 DIAGNOSIS — H25.813 COMBINED FORM OF AGE-RELATED CATARACT, BOTH EYES: Primary | ICD-10-CM

## 2025-05-09 DIAGNOSIS — H35.3132 INTERMEDIATE STAGE NONEXUDATIVE AGE-RELATED MACULAR DEGENERATION OF BOTH EYES: ICD-10-CM

## 2025-05-09 PROCEDURE — 99214 OFFICE O/P EST MOD 30 MIN: CPT | Performed by: OPHTHALMOLOGY

## 2025-05-09 RX ORDER — TETRACAINE HYDROCHLORIDE 5 MG/ML
1 SOLUTION OPHTHALMIC ONCE
OUTPATIENT
Start: 2025-05-09 | End: 2025-05-09

## 2025-05-09 RX ORDER — CYCLOPENTOLATE HYDROCHLORIDE 10 MG/ML
1 SOLUTION/ DROPS OPHTHALMIC
OUTPATIENT
Start: 2025-05-09 | End: 2025-05-09

## 2025-05-09 RX ORDER — PHENYLEPHRINE HYDROCHLORIDE 25 MG/ML
1 SOLUTION/ DROPS OPHTHALMIC
OUTPATIENT
Start: 2025-05-09 | End: 2025-05-09

## 2025-05-09 RX ORDER — TROPICAMIDE 10 MG/ML
1 SOLUTION/ DROPS OPHTHALMIC
OUTPATIENT
Start: 2025-05-09 | End: 2025-05-09

## 2025-05-09 ASSESSMENT — VISUAL ACUITY
OD_CC: 20/30
OS_CC+: -2
CORRECTION_TYPE: GLASSES
OD_CC+: -2
OS_BAT_MED: 20/50-2
METHOD: SNELLEN - LINEAR
OS_PH_CC: 20/30
OD_BAT_MED: 20/60
OS_PH_CC+: -2
OS_CC: 20/50

## 2025-05-09 ASSESSMENT — CONF VISUAL FIELD
OS_INFERIOR_TEMPORAL_RESTRICTION: 0
OD_INFERIOR_NASAL_RESTRICTION: 0
OS_NORMAL: 1
OD_SUPERIOR_TEMPORAL_RESTRICTION: 0
OD_INFERIOR_TEMPORAL_RESTRICTION: 0
OS_SUPERIOR_NASAL_RESTRICTION: 0
OS_SUPERIOR_TEMPORAL_RESTRICTION: 0
OD_NORMAL: 1
OS_INFERIOR_NASAL_RESTRICTION: 0
OD_SUPERIOR_NASAL_RESTRICTION: 0

## 2025-05-09 ASSESSMENT — TONOMETRY
OS_IOP_MMHG: 15
OD_IOP_MMHG: 16
IOP_METHOD: TONOPEN

## 2025-05-09 ASSESSMENT — ENCOUNTER SYMPTOMS
NEUROLOGICAL NEGATIVE: 0
EYES NEGATIVE: 1
GASTROINTESTINAL NEGATIVE: 0
HEMATOLOGIC/LYMPHATIC NEGATIVE: 0
CONSTITUTIONAL NEGATIVE: 0
CARDIOVASCULAR NEGATIVE: 0
ALLERGIC/IMMUNOLOGIC NEGATIVE: 0
RESPIRATORY NEGATIVE: 0
ENDOCRINE NEGATIVE: 0
MUSCULOSKELETAL NEGATIVE: 0
PSYCHIATRIC NEGATIVE: 0

## 2025-05-09 ASSESSMENT — EXTERNAL EXAM - RIGHT EYE: OD_EXAM: NORMAL

## 2025-05-09 ASSESSMENT — SLIT LAMP EXAM - LIDS
COMMENTS: GOOD POSITION, TRACE MGD
COMMENTS: GOOD POSITION, TRACE MGD

## 2025-05-09 ASSESSMENT — CUP TO DISC RATIO
OD_RATIO: .3
OS_RATIO: .3

## 2025-05-09 ASSESSMENT — EXTERNAL EXAM - LEFT EYE: OS_EXAM: NORMAL

## 2025-05-09 NOTE — PROGRESS NOTES
Assessment/Plan   Diagnoses and all orders for this visit:  Age-related nuclear cataract of both eyes    Combined form of age-related cataract, left eyeH25.812  Visually significant. Pt would like to proceed with surgery.    Visually significant cataract OS. BCVA: 20/30. Glare: 20/50. Symptoms: blurry vision, glare. A change in glasses prescription will not result in significant visual improvement at this time.  Indication for cataract surgery: Input To potentially improve visual acuity and improve quality of life/reduce symptoms.   Based on a comprehensive eye exam performed today, a visually significant cataract appears to be the source of decreased vision, diminished quality of life, and impairment of activities of daily living. Discussed option of cataract surgery vs observation. Patient can no longer function adequately with current best corrected visual acuity and wishes to have cataract surgery at this time. Discussed surgical procedure with patient. As a result of cataract extraction, it is believed that the patient will experience improved vision. Discussed potential risks, benefits, and complications of cataract surgery including but not limited to pain, bleeding, infection, inflammation, edema, increased eye pressure, retinal tear/detachment, lens dislocation, ptosis, iris damage, need for additional surgery, need for glasses after surgery, loss of vision/loss of eye. Patient understands and wishes to proceed. All questions were answered. Will schedule cataract surgery OS. Lenstar done today.   Discussed IOL options (standard monofocal, monofocal with monovision, toric, multifocal). Lens chosen: standard monofocal. Defer/decline toric/multifocal lens at this time. Had thorough discussion with patient re: aim. Discussed that may potentially need glasses for best vision both at distance and at near.     Schedule cataract surgery OS  I personally reviewed the lenstar measurements and will choose the lens  accordingly.     Combined forms of age-related cataract of right eyeH25.811  Visually significant. Pt would like to proceed with surgery.    Visually significant cataract OD. BCVA: 20/30. Glare: 20/60. Symptoms: blurry vision, glare. A change in glasses prescription will not result in significant visual improvement at this time.  Indication for cataract surgery: Input To potentially improve visual acuity and improve quality of life/reduce symptoms.   Based on a comprehensive eye exam performed today, a visually significant cataract appears to be the source of decreased vision, diminished quality of life, and impairment of activities of daily living. Discussed option of cataract surgery vs observation. Patient can no longer function adequately with current best corrected visual acuity and wishes to have cataract surgery at this time. Discussed surgical procedure with patient. As a result of cataract extraction, it is believed that the patient will experience improved vision. Discussed potential risks, benefits, and complications of cataract surgery including but not limited to pain, bleeding, infection, inflammation, edema, increased eye pressure, retinal tear/detachment, lens dislocation, ptosis, iris damage, need for additional surgery, need for glasses after surgery, loss of vision/loss of eye. Patient understands and wishes to proceed. All questions were answered. Will schedule cataract surgery OD. Lenstar done today.  Discussed IOL options (standard monofocal, monofocal with monovision, toric, multifocal). Lens chosen: standard monofocal. Defer/decline toric/multifocal lens at this time. Had thorough discussion with patient re: aim. Discussed that may potentially need glasses for best vision both at distance and at near.    Schedule cataract surgery OD  I personally reviewed the lenstar measurements and will choose the lens accordingly.     Vision is limited due to age-related macular degeneration (age-related  macular degeneration (AMD)), will proceed with Monofocal lens in both eyes.

## 2025-05-12 ENCOUNTER — APPOINTMENT (OUTPATIENT)
Dept: UROLOGY | Facility: CLINIC | Age: 80
End: 2025-05-12
Payer: MEDICARE

## 2025-05-12 DIAGNOSIS — N32.81 OAB (OVERACTIVE BLADDER): Primary | ICD-10-CM

## 2025-05-12 LAB
POC APPEARANCE, URINE: CLEAR
POC BILIRUBIN, URINE: NEGATIVE
POC BLOOD, URINE: NEGATIVE
POC COLOR, URINE: ABNORMAL
POC GLUCOSE, URINE: ABNORMAL MG/DL
POC KETONES, URINE: NEGATIVE MG/DL
POC LEUKOCYTES, URINE: NEGATIVE
POC NITRITE,URINE: NEGATIVE
POC PH, URINE: 5.5 PH
POC PROTEIN, URINE: NEGATIVE MG/DL
POC SPECIFIC GRAVITY, URINE: 1.01
POC UROBILINOGEN, URINE: 0.2 EU/DL

## 2025-05-12 PROCEDURE — 51798 US URINE CAPACITY MEASURE: CPT | Performed by: STUDENT IN AN ORGANIZED HEALTH CARE EDUCATION/TRAINING PROGRAM

## 2025-05-12 PROCEDURE — 99204 OFFICE O/P NEW MOD 45 MIN: CPT | Performed by: STUDENT IN AN ORGANIZED HEALTH CARE EDUCATION/TRAINING PROGRAM

## 2025-05-12 RX ORDER — VIBEGRON 75 MG/1
75 TABLET, FILM COATED ORAL DAILY
Qty: 84 TABLET | Refills: 0 | COMMUNITY
Start: 2025-05-12 | End: 2025-08-04

## 2025-05-12 NOTE — LETTER
May 14, 2025     Cierra Dodd, APRN-CNP  [address]  [address]  [facility] [address]    Patient: Vicky Govea   YOB: 1945   Date of Visit: 5/12/2025       Dear Dr. Cierra Dodd, APRN-CNP:    Thank you for referring Vicky Govea to me for evaluation. Below are my notes for this consultation.  If you have questions, please do not hesitate to call me. I look forward to following your patient along with you.       Sincerely,     Shahriar Ag MD      CC: No Recipients  ______________________________________________________________________________________    HISTORY OF PRESENT ILLNESS:  Vicky Govea is a 80 y.o. female who presents today as a new patient. She reports that she saw my talk at the [facility]. She has urgency. She has constant leakage of urine. She does not drink caffeine. She does wake up 2 to 3 times at night to use the restroom. She has some constipation occasionally. She has a hx of a hysterectomy.  Does not complain of a vaginal bulge.         Past Medical History  She has a past medical history of Abnormal urine findings (05/22/2023), Acquired spondylolisthesis, Acute kidney injury (05/22/2023), Acute non-recurrent frontal sinusitis (08/16/2023), Acute pharyngitis due to other specified organisms (05/03/2019), Age-related nuclear cataract of left eye (05/22/2023), Age-related nuclear cataract of right eye (05/22/2023), Allergic, Allergic contact dermatitis due to plants, except food (06/11/2018), Allergic reaction to contrast dye (09/12/2023), Angina pectoris (08/16/2023), Arthritis, Arthritis of right knee (05/22/2023), Back pain (09/12/2023), Benign paroxysmal positional vertigo (07/08/2021), Body mass index (BMI) 26.0-26.9, adult (06/17/2021), Calculus of kidney (12/28/2015), Carpal tunnel syndrome of left wrist (05/22/2023), Carpal tunnel syndrome of right wrist (05/22/2023), Cataract, nuclear sclerotic, both eyes (05/22/2023), Cheilitis (09/02/2021), Chronic epigastric pain  (09/12/2023), Chronic kidney disease, Chronic low back pain (05/22/2023), Colitis (09/12/2023), Contusion of right hand (05/22/2023), Contusion of right hand, initial encounter (10/13/2021), Difficulty in walking, not elsewhere classified (03/19/2020), Difficulty walking (05/22/2023), Diverticulitis of intestine, part unspecified, without perforation or abscess without bleeding (11/15/2018), Diverticulosis of large intestine without perforation or abscess without bleeding (10/22/2021), Dorsalgia, unspecified (11/15/2018), Drusen (degenerative) of macula, bilateral (05/22/2023), Dry eye syndrome of left lacrimal gland (06/08/2020), Dry eyes, Encounter for follow-up examination after completed treatment for conditions other than malignant neoplasm (03/04/2016), Epigastric pain (03/03/2016), Esophageal reflux (05/22/2023), Estrogen deficiency (05/22/2023), Eyelid abnormality (05/22/2023), Gait difficulty (05/22/2023), Gastritis (09/12/2023), Gastro-esophageal reflux disease with esophagitis, without bleeding (10/30/2020), Generalized abdominal pain (11/19/2018), Hemangioma of skin and subcutaneous tissue (09/02/2021), Hiatal hernia (05/22/2023), Hip arthritis (09/17/2013), History of total left knee replacement (05/22/2023), Hyperlipidemia, Hypermetropia, bilateral (07/08/2022), Hyperopia of both eyes (05/22/2023), Hypertension, Hypomagnesemia (06/17/2021), IBS (irritable bowel syndrome), Idiopathic neuropathy, Inflamed seborrheic keratosis (09/02/2021), Injury of finger of right hand (05/22/2023), 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), Lentigines (09/02/2021), 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), Lumbar radicular pain (07/10/2013), Lupus, Macular degeneration, Mass of joint of left wrist (05/22/2023), Melanocytic nevi of trunk (09/02/2021), Melanocytic nevi of unspecified lower limb, including hip (09/02/2021), Melanocytic nevi of unspecified upper limb, including shoulder (09/02/2021), Menopausal  symptom (09/12/2023), Mild vitamin D deficiency (05/22/2023), Muscle spasm of back (05/01/2017), Myalgia due to statin (05/22/2023), Neoplasm of uncertain behavior of skin (09/02/2021), Neurogenic 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 (05/22/2023), Ocular rosacea (05/22/2023), Ocular rosacea (05/22/2023), Osteoarthritis, hand (05/22/2023), Osteoarthritis, knee (02/13/2015), Osteopenia (09/12/2023), Osteoporosis, Other long term (current) drug therapy (07/11/2022), Other nail disorders (10/09/2018), Other reduced mobility (03/19/2020), Other specified abnormal findings of blood chemistry (10/09/2018), Other specified joint disorders, left wrist (02/08/2021), Other specified postprocedural states (09/19/2017), Other symptoms and signs involving the musculoskeletal system (07/16/2021), Other symptoms and signs involving the musculoskeletal system (10/28/2019), Overweight (10/26/2021), Pain due to internal orthopedic prosthetic devices, implants and grafts, initial encounter (01/14/2020), Pain in both hands (05/22/2023), Pain in both wrists (05/22/2023), Pain in left hip (06/25/2021), Pain in left knee (04/16/2020), Pain in right hand (07/21/2021), Pelvis fracture (Multi) (5/23/2002), Personal history of diseases of the skin and subcutaneous tissue (06/26/2017), Personal history of diseases of the skin and subcutaneous tissue (09/06/2018), Personal history of other diseases of the musculoskeletal system and connective tissue (02/06/2020), Personal history of other diseases of the nervous system and sense organs (01/05/2017), Personal history of other diseases of the respiratory system (05/11/2018), Personal history of other endocrine, nutritional and metabolic disease (04/27/2020), Personal history of other specified conditions (03/03/2016), Personal history of other specified conditions (10/25/2022), Personal history of other specified conditions (07/05/2022), Personal history of other specified conditions  (10/28/2019), Personal history of transient ischemic attack (TIA), and cerebral infarction without residual deficits (12/28/2015), Pleurodynia (12/15/2019), Presence of unspecified artificial hip joint (05/15/2020), Rash of face (05/22/2023), Renal mass, right (05/22/2023), Rib pain on left side (05/22/2023), Rib pain on right side (05/22/2023), Right upper quadrant pain (12/18/2017), Rosacea (05/22/2023), Scar adherent (05/22/2023), Scar condition and fibrosis of skin (09/02/2021), Scar conditions and fibrosis of skin (07/02/2021), Seborrheic dermatitis, unspecified (09/02/2021), Spinal stenosis of lumbar region (07/10/2013), Stroke (Multi), Trigger finger, acquired (05/22/2023), Trochanteric bursitis, left hip (05/15/2020), 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    Surgical History  She has a past surgical history that includes Hip surgery (12/28/2015); Hysterectomy (12/28/2015); Total hip arthroplasty (12/28/2015); MR angio head wo IV contrast (12/29/2012); MR angio neck wo IV contrast (12/29/2012); Eye surgery; Joint replacement (Left); Carpal tunnel release (Bilateral); Trigger finger release; and Kidney stone surgery.     Social History  She reports that she has never smoked. She has never used smokeless tobacco. She reports that she does not currently use alcohol. She reports that she does not use drugs.    Family History  Family History[1]     Allergies  Benzocaine, Aspirin, Nsaids (non-steroidal anti-inflammatory drug), Simvastatin, and Tramadol      A comprehensive 10+ review of systems was negative except for: see hpi                          PHYSICAL EXAMINATION:  BP Readings from Last 3 Encounters:   03/25/25 148/76   02/06/25 132/70   02/06/25 120/58      Wt Readings from Last 3 Encounters:   03/25/25 55.8 kg (123 lb)   02/06/25 63.5 kg (140 lb)    01/26/25 63.5 kg (140 lb)      BMI: Estimated body mass index is 24.02 kg/m² as calculated from the following:    Height as of 3/25/25: 1.524 m (5').    Weight as of 3/25/25: 55.8 kg (123 lb).  BSA: Estimated body surface area is 1.54 meters squared as calculated from the following:    Height as of 3/25/25: 1.524 m (5').    Weight as of 3/25/25: 55.8 kg (123 lb).  HEENT: Normocephalic, atraumatic, PER EOMI, nonicteric, trachea normal, thyroid normal, oropharynx normal.  CARDIAC: regular rate & rhythm, S1 & S2 normal.  No heaves, thrills, gallops or murmurs.  LUNGS: Clear to auscultation, no spinal or CV tenderness.  EXTREMITIES: No evidence of cyanosis, clubbing or edema.        Pelvic:  Chaperone for pelvic exam:   Genitourinary:  normal external genitalia, Bartholin's glands, [place]'s glands negative,   Urethra normal meatus, non-tender, no periurethral mass  Vaginal mucosa  normal  Adnexae  negative nontender, no masses  Atrophy positive    CST negative  Pelvic floor muscle contraction  4/5    POP-Q (in supine position):       Aa -3     Ba -3     C -8              gh 3     pb 3     tvl 8              Ap -3     Bp -3     D     Rectal: no hemorrhoids, fissures or masses.    PVR (by ultrasound): 62         Assessment:  80 y.o. female presents as a new patient with OAB      OAB:  -Referral to PFPT   -Limit bladder irritants and water intake to 8 oz an hour   -Samples of gemtesa   -Informational handouts provided   -we discussed botox vs sacral neuromodulation: both have similar efficacy 80% patients reports >50% improvement, botox associated with 5% risk of incomplete emptying, increase in UTI and will require re-injection in 6-9 months; and as early as 3 months. SNM is a staged procedure, 2 weeks apart, consisting first of lead implantation then internalization of IPG if there is improvement. Interstim is associated with lead migration, explantation, infection and bleeding, though risks are all <5%. We also  discussed PTNS which is associated with success rates comparable to medical therapy but without side-effects without significant major morbidity.       Follow up in 3 months       All questions and concerns were answered and addressed.  The patient expressed understanding and agrees with the plan.     Shahriar Ag MD    Scribe Attestation  By signing my name below, I, Mima Roberto Mendiola   attest that this documentation has been prepared under the direction and in the presence of Shahriar Ag MD.         [1]  Family History  Problem Relation Name Age of Onset   • Diabetes Mother Shawanda    • Arthritis Mother Shawanda    • Stroke Mother Shawanda    • Other (pacemaker) Father     • Breast cancer Other grandmother    • Breast cancer Sibling     • Cataracts Sister Shawanda Gilman    • Macular degeneration Sister Shawanda Gilman    • Cataracts Sister Shawanda Gilman    • Macular degeneration Sister Shawanda Gilman         [1]  Family History  Problem Relation Name Age of Onset   • Diabetes Mother Shawanda    • Arthritis Mother Shawanda    • Stroke Mother Shawanda    • Other (pacemaker) Father     • Breast cancer Other grandmother    • Breast cancer Sibling     • Cataracts Sister Shawanda Drakee    • Macular degeneration Sister Shawanda Gilman    • Cataracts Sister Shawanda Kalina    • Macular degeneration Sister Shawanda Gilman

## 2025-05-12 NOTE — PROGRESS NOTES
HISTORY OF PRESENT ILLNESS:  Vicky Govea is a 80 y.o. female who presents today as a new patient. She reports that she saw my talk at the [facility]. She has urgency. She has constant leakage of urine. She does not drink caffeine. She does wake up 2 to 3 times at night to use the restroom. She has some constipation occasionally. She has a hx of a hysterectomy.  Does not complain of a vaginal bulge.         Past Medical History  She has a past medical history of Abnormal urine findings (05/22/2023), Acquired spondylolisthesis, Acute kidney injury (05/22/2023), Acute non-recurrent frontal sinusitis (08/16/2023), Acute pharyngitis due to other specified organisms (05/03/2019), Age-related nuclear cataract of left eye (05/22/2023), Age-related nuclear cataract of right eye (05/22/2023), Allergic, Allergic contact dermatitis due to plants, except food (06/11/2018), Allergic reaction to contrast dye (09/12/2023), Angina pectoris (08/16/2023), Arthritis, Arthritis of right knee (05/22/2023), Back pain (09/12/2023), Benign paroxysmal positional vertigo (07/08/2021), Body mass index (BMI) 26.0-26.9, adult (06/17/2021), Calculus of kidney (12/28/2015), Carpal tunnel syndrome of left wrist (05/22/2023), Carpal tunnel syndrome of right wrist (05/22/2023), Cataract, nuclear sclerotic, both eyes (05/22/2023), Cheilitis (09/02/2021), Chronic epigastric pain (09/12/2023), Chronic kidney disease, Chronic low back pain (05/22/2023), Colitis (09/12/2023), Contusion of right hand (05/22/2023), Contusion of right hand, initial encounter (10/13/2021), Difficulty in walking, not elsewhere classified (03/19/2020), Difficulty walking (05/22/2023), Diverticulitis of intestine, part unspecified, without perforation or abscess without bleeding (11/15/2018), Diverticulosis of large intestine without perforation or abscess without bleeding (10/22/2021), Dorsalgia, unspecified (11/15/2018), Drusen (degenerative) of macula, bilateral (05/22/2023),  Dry eye syndrome of left lacrimal gland (06/08/2020), Dry eyes, Encounter for follow-up examination after completed treatment for conditions other than malignant neoplasm (03/04/2016), Epigastric pain (03/03/2016), Esophageal reflux (05/22/2023), Estrogen deficiency (05/22/2023), Eyelid abnormality (05/22/2023), Gait difficulty (05/22/2023), Gastritis (09/12/2023), Gastro-esophageal reflux disease with esophagitis, without bleeding (10/30/2020), Generalized abdominal pain (11/19/2018), Hemangioma of skin and subcutaneous tissue (09/02/2021), Hiatal hernia (05/22/2023), Hip arthritis (09/17/2013), History of total left knee replacement (05/22/2023), Hyperlipidemia, Hypermetropia, bilateral (07/08/2022), Hyperopia of both eyes (05/22/2023), Hypertension, Hypomagnesemia (06/17/2021), IBS (irritable bowel syndrome), Idiopathic neuropathy, Inflamed seborrheic keratosis (09/02/2021), Injury of finger of right hand (05/22/2023), 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), Lentigines (09/02/2021), 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), Lumbar radicular pain (07/10/2013), Lupus, Macular degeneration, Mass of joint of left wrist (05/22/2023), Melanocytic nevi of trunk (09/02/2021), Melanocytic nevi of unspecified lower limb, including hip (09/02/2021), Melanocytic nevi of unspecified upper limb, including shoulder (09/02/2021), Menopausal symptom (09/12/2023), Mild vitamin D deficiency (05/22/2023), Muscle spasm of back (05/01/2017), Myalgia due to statin (05/22/2023), Neoplasm of uncertain behavior of skin (09/02/2021), Neurogenic 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 (05/22/2023), Ocular rosacea (05/22/2023), Ocular rosacea (05/22/2023), Osteoarthritis, hand (05/22/2023), Osteoarthritis, knee (02/13/2015), Osteopenia (09/12/2023), Osteoporosis, Other long term (current) drug therapy (07/11/2022), Other nail disorders (10/09/2018), Other reduced mobility (03/19/2020), Other specified abnormal findings of  blood chemistry (10/09/2018), Other specified joint disorders, left wrist (02/08/2021), Other specified postprocedural states (09/19/2017), Other symptoms and signs involving the musculoskeletal system (07/16/2021), Other symptoms and signs involving the musculoskeletal system (10/28/2019), Overweight (10/26/2021), Pain due to internal orthopedic prosthetic devices, implants and grafts, initial encounter (01/14/2020), Pain in both hands (05/22/2023), Pain in both wrists (05/22/2023), Pain in left hip (06/25/2021), Pain in left knee (04/16/2020), Pain in right hand (07/21/2021), Pelvis fracture (Multi) (5/23/2002), Personal history of diseases of the skin and subcutaneous tissue (06/26/2017), Personal history of diseases of the skin and subcutaneous tissue (09/06/2018), Personal history of other diseases of the musculoskeletal system and connective tissue (02/06/2020), Personal history of other diseases of the nervous system and sense organs (01/05/2017), Personal history of other diseases of the respiratory system (05/11/2018), Personal history of other endocrine, nutritional and metabolic disease (04/27/2020), Personal history of other specified conditions (03/03/2016), Personal history of other specified conditions (10/25/2022), Personal history of other specified conditions (07/05/2022), Personal history of other specified conditions (10/28/2019), Personal history of transient ischemic attack (TIA), and cerebral infarction without residual deficits (12/28/2015), Pleurodynia (12/15/2019), Presence of unspecified artificial hip joint (05/15/2020), Rash of face (05/22/2023), Renal mass, right (05/22/2023), Rib pain on left side (05/22/2023), Rib pain on right side (05/22/2023), Right upper quadrant pain (12/18/2017), Rosacea (05/22/2023), Scar adherent (05/22/2023), Scar condition and fibrosis of skin (09/02/2021), Scar conditions and fibrosis of skin (07/02/2021), Seborrheic dermatitis, unspecified (09/02/2021),  Spinal stenosis of lumbar region (07/10/2013), Stroke (Multi), Trigger finger, acquired (05/22/2023), Trochanteric bursitis, left hip (05/15/2020), 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    Surgical History  She has a past surgical history that includes Hip surgery (12/28/2015); Hysterectomy (12/28/2015); Total hip arthroplasty (12/28/2015); MR angio head wo IV contrast (12/29/2012); MR angio neck wo IV contrast (12/29/2012); Eye surgery; Joint replacement (Left); Carpal tunnel release (Bilateral); Trigger finger release; and Kidney stone surgery.     Social History  She reports that she has never smoked. She has never used smokeless tobacco. She reports that she does not currently use alcohol. She reports that she does not use drugs.    Family History  Family History[1]     Allergies  Benzocaine, Aspirin, Nsaids (non-steroidal anti-inflammatory drug), Simvastatin, and Tramadol      A comprehensive 10+ review of systems was negative except for: see hpi                          PHYSICAL EXAMINATION:  BP Readings from Last 3 Encounters:   03/25/25 148/76   02/06/25 132/70   02/06/25 120/58      Wt Readings from Last 3 Encounters:   03/25/25 55.8 kg (123 lb)   02/06/25 63.5 kg (140 lb)   01/26/25 63.5 kg (140 lb)      BMI: Estimated body mass index is 24.02 kg/m² as calculated from the following:    Height as of 3/25/25: 1.524 m (5').    Weight as of 3/25/25: 55.8 kg (123 lb).  BSA: Estimated body surface area is 1.54 meters squared as calculated from the following:    Height as of 3/25/25: 1.524 m (5').    Weight as of 3/25/25: 55.8 kg (123 lb).  HEENT: Normocephalic, atraumatic, PER EOMI, nonicteric, trachea normal, thyroid normal, oropharynx normal.  CARDIAC: regular rate & rhythm, S1 & S2 normal.  No heaves, thrills, gallops or murmurs.  LUNGS: Clear to  auscultation, no spinal or CV tenderness.  EXTREMITIES: No evidence of cyanosis, clubbing or edema.        Pelvic:  Chaperone for pelvic exam:   Genitourinary:  normal external genitalia, Bartholin's glands, [place]'s glands negative,   Urethra normal meatus, non-tender, no periurethral mass  Vaginal mucosa  normal  Adnexae  negative nontender, no masses  Atrophy positive    CST negative  Pelvic floor muscle contraction  4/5    POP-Q (in supine position):       Aa -3     Ba -3     C -8              gh 3     pb 3     tvl 8              Ap -3     Bp -3     D     Rectal: no hemorrhoids, fissures or masses.    PVR (by ultrasound): 62         Assessment:  80 y.o. female presents as a new patient with OAB      OAB:  -Referral to PFPT   -Limit bladder irritants and water intake to 8 oz an hour   -Samples of gemtesa   -Informational handouts provided   -we discussed botox vs sacral neuromodulation: both have similar efficacy 80% patients reports >50% improvement, botox associated with 5% risk of incomplete emptying, increase in UTI and will require re-injection in 6-9 months; and as early as 3 months. SNM is a staged procedure, 2 weeks apart, consisting first of lead implantation then internalization of IPG if there is improvement. Interstim is associated with lead migration, explantation, infection and bleeding, though risks are all <5%. We also discussed PTNS which is associated with success rates comparable to medical therapy but without side-effects without significant major morbidity.       Follow up in 3 months       All questions and concerns were answered and addressed.  The patient expressed understanding and agrees with the plan.     Shahriar Ag MD    Scribe Attestation  By signing my name below, I MimaRoberto Dong   attest that this documentation has been prepared under the direction and in the presence of Shahriar Ag MD.         [1]   Family History  Problem Relation Name Age of Onset    Diabetes Mother  Shawanda     Arthritis Mother Shawanda     Stroke Mother Shawanda     Other (pacemaker) Father      Breast cancer Other grandmother     Breast cancer Sibling      Cataracts Sister Shawanda Gilman     Macular degeneration Sister Shawanda Gilman     Cataracts Sister Shawanda Gilman     Macular degeneration Sister Shawanda Gilman

## 2025-05-19 ENCOUNTER — OFFICE VISIT (OUTPATIENT)
Dept: PRIMARY CARE | Facility: CLINIC | Age: 80
End: 2025-05-19
Payer: MEDICARE

## 2025-05-19 VITALS
DIASTOLIC BLOOD PRESSURE: 72 MMHG | BODY MASS INDEX: 24.15 KG/M2 | WEIGHT: 123 LBS | SYSTOLIC BLOOD PRESSURE: 128 MMHG | HEIGHT: 60 IN | HEART RATE: 75 BPM | OXYGEN SATURATION: 98 %

## 2025-05-19 DIAGNOSIS — Z00.00 ROUTINE ADULT HEALTH MAINTENANCE: ICD-10-CM

## 2025-05-19 DIAGNOSIS — I10 BENIGN ESSENTIAL HYPERTENSION: ICD-10-CM

## 2025-05-19 DIAGNOSIS — R81 GLYCOSURIA: Primary | ICD-10-CM

## 2025-05-19 DIAGNOSIS — N18.32 STAGE 3B CHRONIC KIDNEY DISEASE (MULTI): ICD-10-CM

## 2025-05-19 DIAGNOSIS — R73.09 BLOOD GLUCOSE ABNORMAL: ICD-10-CM

## 2025-05-19 LAB
POC FINGERSTICK BLOOD GLUCOSE: 113 MG/DL (ref 70–100)
POC HEMOGLOBIN A1C: 5.4 % (ref 4.2–6.5)

## 2025-05-19 PROCEDURE — G2211 COMPLEX E/M VISIT ADD ON: HCPCS | Performed by: NURSE PRACTITIONER

## 2025-05-19 PROCEDURE — 82962 GLUCOSE BLOOD TEST: CPT | Performed by: NURSE PRACTITIONER

## 2025-05-19 PROCEDURE — 3078F DIAST BP <80 MM HG: CPT | Performed by: NURSE PRACTITIONER

## 2025-05-19 PROCEDURE — 99213 OFFICE O/P EST LOW 20 MIN: CPT | Performed by: NURSE PRACTITIONER

## 2025-05-19 PROCEDURE — 3074F SYST BP LT 130 MM HG: CPT | Performed by: NURSE PRACTITIONER

## 2025-05-19 PROCEDURE — 1160F RVW MEDS BY RX/DR IN RCRD: CPT | Performed by: NURSE PRACTITIONER

## 2025-05-19 PROCEDURE — 83036 HEMOGLOBIN GLYCOSYLATED A1C: CPT | Performed by: NURSE PRACTITIONER

## 2025-05-19 PROCEDURE — 1159F MED LIST DOCD IN RCRD: CPT | Performed by: NURSE PRACTITIONER

## 2025-05-19 PROCEDURE — 1036F TOBACCO NON-USER: CPT | Performed by: NURSE PRACTITIONER

## 2025-05-19 RX ORDER — CELECOXIB 200 MG/1
1 CAPSULE ORAL
COMMUNITY
Start: 2025-04-05

## 2025-05-19 ASSESSMENT — ENCOUNTER SYMPTOMS
LOSS OF SENSATION IN FEET: 0
WHEEZING: 0
DEPRESSION: 0
CONSTIPATION: 0
TROUBLE SWALLOWING: 0
BACK PAIN: 1
FEVER: 0
FATIGUE: 0
MYALGIAS: 0
EYE PAIN: 0
OCCASIONAL FEELINGS OF UNSTEADINESS: 0
COUGH: 0
ADENOPATHY: 0
CHILLS: 0
HEADACHES: 0
JOINT SWELLING: 0
ABDOMINAL PAIN: 0
DIZZINESS: 0
WEAKNESS: 0
ARTHRALGIAS: 1
ABDOMINAL DISTENTION: 0
BRUISES/BLEEDS EASILY: 0
COLOR CHANGE: 0
NAUSEA: 0
DIFFICULTY URINATING: 0
SEIZURES: 0
WOUND: 0
PALPITATIONS: 0
SHORTNESS OF BREATH: 0
DIARRHEA: 0

## 2025-05-19 ASSESSMENT — PATIENT HEALTH QUESTIONNAIRE - PHQ9
1. LITTLE INTEREST OR PLEASURE IN DOING THINGS: NOT AT ALL
2. FEELING DOWN, DEPRESSED OR HOPELESS: NOT AT ALL
SUM OF ALL RESPONSES TO PHQ9 QUESTIONS 1 AND 2: 0

## 2025-05-19 NOTE — ASSESSMENT & PLAN NOTE
Office point-of-care glucose was 113 and hemoglobin A1c was within normal limits.  Secondary to Jardiance use?  No concerns for diabetes/persistent elevated blood glucose levels at this time.  Will continue to monitor routinely for surveillance purposes

## 2025-05-19 NOTE — PROGRESS NOTES
Subjective   Patient ID: Vicky Govea is a 80 y.o. female who presents for Follow-up (High Glucose in urine ).    Patient seen today due to concerns of elevated glucose levels in her urine.  Patient recently followed with urology due to concerns of overactive bladder and was found to have an over 500 reading of glucose in her urine on point-of-care urinalysis.  I discussed symptoms of elevated blood glucose levels which patient denied.  She is on Jardiance and was wondering if this could contribute to her elevated readings.  Point of care blood glucose level in office today was 113.  Patient denies any significant changes in her diet or activity levels.  She does complain of very intermittent left-sided mid back pain and was concerned that may be related to her kidneys although she does have documented degenerative disc disease.  She continues to workout routinely through water aerobics.  Patient underwent a total knee replacement with Dr. Richards from orthopedics earlier this year.  Mobility and pain continue to improve.  Discussed upcoming cataract removals with ophthalmology specialist.  No other acute concerns voiced at this time.         Current Outpatient Medications on File Prior to Visit   Medication Sig Dispense Refill    amLODIPine (Norvasc) 10 mg tablet Take 1 tablet (10 mg) by mouth once daily. 90 tablet 1    atorvastatin (Lipitor) 10 mg tablet TAKE 1 TABLET DAILY AT BEDTIME 90 tablet 3    calcium carbonate-vitamin D3 500 mg-5 mcg (200 unit) tablet Take 1 tablet by mouth once daily.      celecoxib (CeleBREX) 200 mg capsule Take 1 capsule (200 mg) by mouth early in the morning.. (Patient taking differently: Take 1 capsule (200 mg) by mouth once daily as needed for moderate pain (4 - 6).)      cholecalciferol (Vitamin D-3) 50 mcg (2,000 unit) capsule Take 1 capsule (50 mcg) by mouth early in the morning..      cranberry fruit 400 mg tablet Take 1 tablet by mouth once daily.      dexlansoprazole (Dexilant)  30 mg DR capsule Take 1 capsule (30 mg) by mouth once daily. Do not crush or chew. (Patient taking differently: Take 1 capsule (30 mg) by mouth if needed (gerd). Do not crush or chew.  Take 30 or 60mg PRN) 30 capsule 11    empagliflozin (Jardiance) 10 mg tablet Take 1 tablet (10 mg) by mouth once daily. 90 tablet 3    ferrous sulfate, 325 mg ferrous sulfate, tablet Take 1 tablet (325 mg) by mouth 3 times a week. 3 times weekly with breakfast 36 tablet 3    gabapentin (Neurontin) 100 mg capsule Take 1 capsule (100 mg) by mouth once daily at bedtime. (Patient taking differently: Take 1 capsule (100 mg) by mouth once daily at bedtime. Takes occasionally) 90 capsule 0    hydroxychloroquine (Plaquenil) 200 mg tablet TAKE 1 TABLET ONCE DAILY 90 tablet 3    lisinopril 20 mg tablet Take 1 tablet (20 mg) by mouth 2 times a day. 180 tablet 0    MAGNESIUM GLYCINATE ORAL Take 200 mg by mouth early in the morning. PATIENT HAS MAGNESIUM GLYCINATE IN CABINET NOT MAGNESIUM OXIDE LISTED ON DC MEDS  Indications: vitamin deficiency      multivitamin tablet Take 1 tablet by mouth once daily.      vibegron (Gemtesa) 75 mg tablet Take 1 tablet (75 mg) by mouth once daily. [id number]  1/29 84 tablet 0    vit A/vit C/vit E/zinc/copper (PRESERVISION AREDS ORAL) Take 1 tablet by mouth 2 times a day.      [DISCONTINUED] magnesium oxide-Mg AA chelate 300 mg capsule Take 1 capsule (300 mg) by mouth once daily.       No current facility-administered medications on file prior to visit.       Past Medical History:   Diagnosis Date    Abnormal urine findings 05/22/2023    Acquired spondylolisthesis     Acute kidney injury 05/22/2023    Repeat cbc,cmp    Acute non-recurrent frontal sinusitis 08/16/2023    - augmentin twice a day 7 days  -  Nasal saline, increase fluids  -  hand hygiene and infection prevention discussed  -  Warm compress to sinuses  - humidification  - recommend to eat yogurt twice daily while taking antibiotic or a daily probiotic        Acute pharyngitis due to other specified organisms 05/03/2019    Acute bacterial pharyngitis    Age-related nuclear cataract of left eye 05/22/2023    Age-related nuclear cataract of right eye 05/22/2023    Allergic     Allergic contact dermatitis due to plants, except food 06/11/2018    Contact dermatitis due to poison oak    Allergic reaction to contrast dye 09/12/2023    Angina pectoris 08/16/2023    Arthritis     Arthritis of right knee 05/22/2023    Back pain 09/12/2023    Benign paroxysmal positional vertigo 07/08/2021    Body mass index (BMI) 26.0-26.9, adult 06/17/2021    Body mass index (BMI) of 26.0 to 26.9 in adult    Calculus of kidney 12/28/2015    Recurrent kidney stones    Carpal tunnel syndrome of left wrist 05/22/2023    Carpal tunnel syndrome of right wrist 05/22/2023    Cataract, nuclear sclerotic, both eyes 05/22/2023    Cheilitis 09/02/2021    Chronic epigastric pain 09/12/2023    Chronic kidney disease     Chronic low back pain 05/22/2023    Colitis 09/12/2023    Contusion of right hand 05/22/2023    Contusion of right hand, initial encounter 10/13/2021    Contusion of right hand, initial encounter    Difficulty in walking, not elsewhere classified 03/19/2020    Difficulty walking    Difficulty walking 05/22/2023    Diverticulitis of intestine, part unspecified, without perforation or abscess without bleeding 11/15/2018    Acute diverticulitis    Diverticulosis of large intestine without perforation or abscess without bleeding 10/22/2021    Diverticulosis of colon    Dorsalgia, unspecified 11/15/2018    Acute back pain    Drusen (degenerative) of macula, bilateral 05/22/2023    Dry eye syndrome of left lacrimal gland 06/08/2020    Dry eye syndrome of left lacrimal gland    Dry eyes     Encounter for follow-up examination after completed treatment for conditions other than malignant neoplasm 03/04/2016    Hospital discharge follow-up    Epigastric pain 03/03/2016    Dyspepsia    Esophageal  reflux 05/22/2023    Estrogen deficiency 05/22/2023    Eyelid abnormality 05/22/2023    Gait difficulty 05/22/2023    Gastritis 09/12/2023    Gastro-esophageal reflux disease with esophagitis, without bleeding 10/30/2020    Reflux esophagitis    Generalized abdominal pain 11/19/2018    Acute generalized abdominal pain    Hemangioma of skin and subcutaneous tissue 09/02/2021    Hiatal hernia 05/22/2023    Hip arthritis 09/17/2013    History of total left knee replacement 05/22/2023    Hyperlipidemia     Hypermetropia, bilateral 07/08/2022    Hypermetropia of both eyes    Hyperopia of both eyes 05/22/2023    Hypertension     Hypomagnesemia 06/17/2021    Hypomagnesemia    IBS (irritable bowel syndrome)     Idiopathic neuropathy     Inflamed seborrheic keratosis 09/02/2021    Injury of finger of right hand 05/22/2023    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    Lentigines 09/02/2021    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    Lumbar radicular pain 07/10/2013    Lupus     Macular degeneration     bilaterally    Mass of joint of left wrist 05/22/2023    Melanocytic nevi of trunk 09/02/2021    Melanocytic nevi of unspecified lower limb, including hip 09/02/2021    Melanocytic nevi of unspecified upper limb, including shoulder 09/02/2021    Menopausal symptom 09/12/2023    Mild vitamin D deficiency 05/22/2023    Muscle spasm of back 05/01/2017    Back muscle spasm    Myalgia due to statin 05/22/2023    Neoplasm of uncertain behavior of skin 09/02/2021    Neurogenic 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 05/22/2023    Ocular rosacea 05/22/2023    Ocular rosacea 05/22/2023    Osteoarthritis, hand 05/22/2023    Osteoarthritis, knee 02/13/2015    Osteopenia 09/12/2023    Osteoporosis     Other long term (current) drug therapy 07/11/2022    Encounter for long-term current use of high risk medication    Other nail disorders 10/09/2018    Leukonychia striata    Other reduced mobility 03/19/2020    Impaired  mobility and ADLs    Other specified abnormal findings of blood chemistry 10/09/2018    Elevated serum creatinine    Other specified joint disorders, left wrist 02/08/2021    Mass of joint of left wrist    Other specified postprocedural states 09/19/2017    History of colonoscopy    Other symptoms and signs involving the musculoskeletal system 07/16/2021    Wrist weakness    Other symptoms and signs involving the musculoskeletal system 10/28/2019    Weakness of both lower extremities    Overweight 10/26/2021    Overweight with body mass index (BMI) of 26 to 26.9 in adult    Pain due to internal orthopedic prosthetic devices, implants and grafts, initial encounter 01/14/2020    Pain due to total knee replacement, initial encounter    Pain in both hands 05/22/2023    Pain in both wrists 05/22/2023    Pain in left hip 06/25/2021    Pain of left hip joint    Pain in left knee 04/16/2020    Left knee pain    Pain in right hand 07/21/2021    Pain in both hands    Pelvis fracture (Multi) 5/23/2002    Personal history of diseases of the skin and subcutaneous tissue 06/26/2017    History of urticaria    Personal history of diseases of the skin and subcutaneous tissue 09/06/2018    History of contact dermatitis    Personal history of other diseases of the musculoskeletal system and connective tissue 02/06/2020    History of muscle pain    Personal history of other diseases of the nervous system and sense organs 01/05/2017    History of sleep disturbance    Personal history of other diseases of the respiratory system 05/11/2018    History of acute sinusitis    Personal history of other endocrine, nutritional and metabolic disease 04/27/2020    History of estrogen deficiency    Personal history of other specified conditions 03/03/2016    History of epigastric pain    Personal history of other specified conditions 10/25/2022    History of weakness    Personal history of other specified conditions 07/05/2022    History of  epigastric pain    Personal history of other specified conditions 10/28/2019    History of gait disorder    Personal history of transient ischemic attack (TIA), and cerebral infarction without residual deficits 12/28/2015    History of stroke    Pleurodynia 12/15/2019    Rib pain on left side    Presence of unspecified artificial hip joint 05/15/2020    Status post revision of total hip replacement    Rash of face 05/22/2023    Renal mass, right 05/22/2023    Rib pain on left side 05/22/2023    Rib pain on right side 05/22/2023    Xray chest    Right upper quadrant pain 12/18/2017    Abdominal pain, RUQ    Rosacea 05/22/2023    Scar adherent 05/22/2023    Scar condition and fibrosis of skin 09/02/2021    Scar conditions and fibrosis of skin 07/02/2021    Scar adherent    Seborrheic dermatitis, unspecified 09/02/2021    Spinal stenosis of lumbar region 07/10/2013    Stroke (Multi)     Trigger finger, acquired 05/22/2023    Trochanteric bursitis, left hip 05/15/2020    Greater trochanteric bursitis of left hip    Ulcerative blepharitis 05/22/2023    Vitamin D deficiency, unspecified 06/09/2016    Hypovitaminosis D    Weakness of both lower extremities 05/22/2023    Wrist stiffness 05/22/2023    Wrist weakness 05/22/2023        Past Surgical History:   Procedure Laterality Date    CARPAL TUNNEL RELEASE Bilateral     EYE SURGERY      HIP SURGERY  12/28/2015    Hip Surgery    HYSTERECTOMY  12/28/2015    Hysterectomy    JOINT REPLACEMENT Left     TKA    KIDNEY STONE SURGERY      MR HEAD ANGIO WO IV CONTRAST  12/29/2012    MR HEAD ANGIO WO IV CONTRAST [facility]    MR NECK ANGIO WO IV CONTRAST  12/29/2012    MR NECK ANGIO WO IV CONTRAST [facility]    TOTAL HIP ARTHROPLASTY  12/28/2015    Hip Replacement    TRIGGER FINGER RELEASE          Family History   Problem Relation Name Age of Onset    Diabetes Mother Shawanda     Arthritis Mother Shawanda     Stroke Mother Shawanda     Other (pacemaker) Father       Breast cancer Other grandmother     Breast cancer Sibling      Cataracts Sister Shawanda Gilman     Macular degeneration Sister Shawanda Gilman     Cataracts Sister Shawanda Gilman     Macular degeneration Sister Shawanda Gilman         Review of Systems   Constitutional:  Negative for chills, fatigue and fever.   HENT:  Negative for dental problem and trouble swallowing.    Eyes:  Negative for pain and visual disturbance.        Wears glasses; Positie for cataracts   Respiratory:  Negative for cough, shortness of breath and wheezing.    Cardiovascular:  Negative for chest pain, palpitations and leg swelling.   Gastrointestinal:  Negative for abdominal distention, abdominal pain, constipation, diarrhea and nausea.        Positive for IBS-D   Endocrine: Negative for cold intolerance and heat intolerance.   Genitourinary:  Negative for difficulty urinating.        Positive for nocturia    Musculoskeletal:  Positive for arthralgias and back pain. Negative for gait problem, joint swelling and myalgias.        Positive for polyarthalgia. See HPI   Skin:  Negative for color change, pallor, rash and wound.   Allergic/Immunologic: Negative for environmental allergies and food allergies.   Neurological:  Negative for dizziness, seizures, weakness and headaches.   Hematological:  Negative for adenopathy. Does not bruise/bleed easily.   Psychiatric/Behavioral:  Negative for behavioral problems.    All other systems reviewed and are negative.      Objective   /72   Pulse 75   Ht 1.524 m (5')   Wt 55.8 kg (123 lb)   LMP  (LMP Unknown)   SpO2 98%   BMI 24.02 kg/m²     Physical Exam  Constitutional:       General: She is not in acute distress.     Appearance: Normal appearance. She is not toxic-appearing.   HENT:      Head: Normocephalic and atraumatic.      Right Ear: External ear normal.      Left Ear: External ear normal.      Nose: Nose normal.      Mouth/Throat:      Mouth: Mucous membranes are moist.      Pharynx: Oropharynx is  clear.   Eyes:      Extraocular Movements: Extraocular movements intact.      Conjunctiva/sclera: Conjunctivae normal.   Cardiovascular:      Rate and Rhythm: Normal rate and regular rhythm.      Pulses: Normal pulses.      Heart sounds: Normal heart sounds. No murmur heard.  Pulmonary:      Effort: Pulmonary effort is normal.      Breath sounds: Normal breath sounds. No wheezing.   Abdominal:      General: Bowel sounds are normal.      Palpations: Abdomen is soft.   Musculoskeletal:         General: No swelling.      Cervical back: Normal range of motion and neck supple.   Skin:     General: Skin is warm and dry.   Neurological:      General: No focal deficit present.      Mental Status: She is alert and oriented to person, place, and time. Mental status is at baseline.      Cranial Nerves: No cranial nerve deficit.      Motor: No weakness.   Psychiatric:         Mood and Affect: Mood normal.         Behavior: Behavior normal.         Thought Content: Thought content normal.         Judgment: Judgment normal.         Assessment/Plan   Problem List Items Addressed This Visit           ICD-10-CM    Benign essential hypertension I10    Patient to continue current medication regiment. She is to notify provider for any persistent elevations in systolic blood pressure or symptomatic hypotension.  Patient to continue with low-sodium, heart healthy diet.  Provider to be notified for any new cardiac concerns.         Routine adult health maintenance Z00.00    Most recent blood work reviewed.  Will continue to monitor as appropriate.  -Most recent colonoscopy completed in may, 2023 with recommendations that no additional screening colonoscopies are indicated given age and lack of symptoms  -DEXA completed in 2023 and was positive for osteoporosis.  Patient's previous provider recommended increased calcium and vitamin D supplements in addition to weightbearing activities.  Will rescreen patient in 2025         Stage 3 chronic  kidney disease (Multi) N18.30    Patient continues on Jardiance as prescribed.  Patient encouraged to stay hydrated and maintain scheduled follow-up with nephrology for continued evaluation and treatment recommendations.  Orders placed today to recheck renal function for surveillance purposes         Relevant Orders    Renal function panel    CBC and Auto Differential    Glycosuria - Primary R81    Office point-of-care glucose was 113 and hemoglobin A1c was within normal limits.  Secondary to Jardiance use?  No concerns for diabetes/persistent elevated blood glucose levels at this time.  Will continue to monitor routinely for surveillance purposes          Other Visit Diagnoses         Codes      Blood glucose abnormal     R73.09    Relevant Orders    POCT glycosylated hemoglobin (Hb A1C) manually resulted (Completed)    POCT Fingerstick Glucose manually resulted (Completed)

## 2025-05-19 NOTE — ASSESSMENT & PLAN NOTE
Patient to continue current medication regiment. She is to notify provider for any persistent elevations in systolic blood pressure or symptomatic hypotension.  Patient to continue with low-sodium, heart healthy diet.  Provider to be notified for any new cardiac concerns.

## 2025-05-19 NOTE — ASSESSMENT & PLAN NOTE
Patient continues on Jardiance as prescribed.  Patient encouraged to stay hydrated and maintain scheduled follow-up with nephrology for continued evaluation and treatment recommendations.  Orders placed today to recheck renal function for surveillance purposes

## 2025-05-20 LAB
ALBUMIN SERPL-MCNC: 4.4 G/DL (ref 3.6–5.1)
BASOPHILS # BLD AUTO: 23 CELLS/UL (ref 0–200)
BASOPHILS NFR BLD AUTO: 0.3 %
BUN SERPL-MCNC: 29 MG/DL (ref 7–25)
BUN/CREAT SERPL: 23 (CALC) (ref 6–22)
CALCIUM SERPL-MCNC: 10 MG/DL (ref 8.6–10.4)
CHLORIDE SERPL-SCNC: 104 MMOL/L (ref 98–110)
CO2 SERPL-SCNC: 27 MMOL/L (ref 20–32)
CREAT SERPL-MCNC: 1.26 MG/DL (ref 0.6–0.95)
EGFRCR SERPLBLD CKD-EPI 2021: 43 ML/MIN/1.73M2
EOSINOPHIL # BLD AUTO: 323 CELLS/UL (ref 15–500)
EOSINOPHIL NFR BLD AUTO: 4.3 %
ERYTHROCYTE [DISTWIDTH] IN BLOOD BY AUTOMATED COUNT: 12.1 % (ref 11–15)
GLUCOSE SERPL-MCNC: 89 MG/DL (ref 65–99)
HCT VFR BLD AUTO: 40 % (ref 35–45)
HGB BLD-MCNC: 12.7 G/DL (ref 11.7–15.5)
LYMPHOCYTES # BLD AUTO: 1718 CELLS/UL (ref 850–3900)
LYMPHOCYTES NFR BLD AUTO: 22.9 %
MCH RBC QN AUTO: 29.2 PG (ref 27–33)
MCHC RBC AUTO-ENTMCNC: 31.8 G/DL (ref 32–36)
MCV RBC AUTO: 92 FL (ref 80–100)
MONOCYTES # BLD AUTO: 600 CELLS/UL (ref 200–950)
MONOCYTES NFR BLD AUTO: 8 %
NEUTROPHILS # BLD AUTO: 4838 CELLS/UL (ref 1500–7800)
NEUTROPHILS NFR BLD AUTO: 64.5 %
PHOSPHATE SERPL-MCNC: 3.6 MG/DL (ref 2.1–4.3)
PLATELET # BLD AUTO: 263 THOUSAND/UL (ref 140–400)
PMV BLD REES-ECKER: 10.9 FL (ref 7.5–12.5)
POTASSIUM SERPL-SCNC: 4.2 MMOL/L (ref 3.5–5.3)
RBC # BLD AUTO: 4.35 MILLION/UL (ref 3.8–5.1)
SODIUM SERPL-SCNC: 139 MMOL/L (ref 135–146)
WBC # BLD AUTO: 7.5 THOUSAND/UL (ref 3.8–10.8)

## 2025-05-22 ENCOUNTER — APPOINTMENT (OUTPATIENT)
Dept: OPHTHALMOLOGY | Facility: CLINIC | Age: 80
End: 2025-05-22
Payer: MEDICARE

## 2025-05-28 RX ORDER — CLOPIDOGREL BISULFATE 75 MG/1
75 TABLET ORAL DAILY
COMMUNITY

## 2025-05-31 DIAGNOSIS — M43.10 ACQUIRED SPONDYLOLISTHESIS: ICD-10-CM

## 2025-05-31 RX ORDER — GABAPENTIN 100 MG/1
100 CAPSULE ORAL DAILY PRN
Qty: 90 CAPSULE | Refills: 0 | Status: SHIPPED | OUTPATIENT
Start: 2025-05-31

## 2025-06-05 ENCOUNTER — APPOINTMENT (OUTPATIENT)
Dept: RHEUMATOLOGY | Facility: CLINIC | Age: 80
End: 2025-06-05
Payer: MEDICARE

## 2025-06-05 VITALS
HEART RATE: 71 BPM | BODY MASS INDEX: 24.3 KG/M2 | OXYGEN SATURATION: 99 % | HEIGHT: 60 IN | SYSTOLIC BLOOD PRESSURE: 135 MMHG | DIASTOLIC BLOOD PRESSURE: 70 MMHG | WEIGHT: 123.8 LBS

## 2025-06-05 DIAGNOSIS — Z79.899 LONG-TERM USE OF PLAQUENIL: ICD-10-CM

## 2025-06-05 DIAGNOSIS — M35.9 UNDIFFERENTIATED CONNECTIVE TISSUE DISEASE (MULTI): Primary | ICD-10-CM

## 2025-06-05 DIAGNOSIS — M11.20 PSEUDOGOUT: ICD-10-CM

## 2025-06-05 PROCEDURE — G2211 COMPLEX E/M VISIT ADD ON: HCPCS | Performed by: INTERNAL MEDICINE

## 2025-06-05 PROCEDURE — 3078F DIAST BP <80 MM HG: CPT | Performed by: INTERNAL MEDICINE

## 2025-06-05 PROCEDURE — 1159F MED LIST DOCD IN RCRD: CPT | Performed by: INTERNAL MEDICINE

## 2025-06-05 PROCEDURE — 3075F SYST BP GE 130 - 139MM HG: CPT | Performed by: INTERNAL MEDICINE

## 2025-06-05 PROCEDURE — 1125F AMNT PAIN NOTED PAIN PRSNT: CPT | Performed by: INTERNAL MEDICINE

## 2025-06-05 PROCEDURE — 99214 OFFICE O/P EST MOD 30 MIN: CPT | Performed by: INTERNAL MEDICINE

## 2025-06-05 ASSESSMENT — ENCOUNTER SYMPTOMS
ROS GI COMMENTS: IBS
FATIGUE: 0
SHORTNESS OF BREATH: 0

## 2025-06-05 ASSESSMENT — PAIN SCALES - GENERAL: PAINLEVEL_OUTOF10: 4

## 2025-06-05 NOTE — PROGRESS NOTES
Subjective   Patient ID: Vicky Govea is a 80 y.o. female who presents for Follow-up (6 mo fuv).  HPI  Patient with history of positive SUHA, positive anti-double-stranded DNA with antihistone antibody.  Also history of osteoarthritis and chondrocalcinosis seen on her wrist x-rays.  Unable to tolerate colchicine    Patient was last seen in November  And at that time we checked blood work and had her continue with Plaquenil for her history of undifferentiated connective tissue disease and gabapentin for neuropathy symptoms  Patient discontinued medications due to kidney function.  She is off gabapentin and NSAIDs.  She does feel more achy especially in her right hand.  Her 2nd and 3rd MCPs are little bit more enlarged and has some discomfort in the medial right wrist.    She did have a fall.  She tripped over a drain pipe and landed on tile at the fertilizer that she was carrying    She did just have right knee replacement and is doing well with that.  Will be going in for cataract surgery next week  Review of Systems   Constitutional:  Negative for fatigue.   HENT:  Negative for mouth sores.    Eyes:         Checked for plaquenil toxicity.  Also evaluated for MD   Respiratory:  Negative for shortness of breath.    Gastrointestinal:         IBS   Musculoskeletal:         Per HPI   Skin:  Negative for rash.   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    - Currently on hydroxychloroquine.  Will check inflammatory markers and anti-double-stranded DNA.  She does have chronic kidney disease and  is now off all NSAIDs and gabapentin.     - She does get her eyes examined for Plaquenil toxicity and is going in for cataract surgery    Did have evidence of chondrocalcinosis on her right wrist.  She was unable to tolerate colchicine.  Also has chronic kidney disease.  I feel that the symptoms in her hands have gotten worse and she has been off NSAIDs    Neuropathy symptoms in her feet.  Previously had been on gabapentin but discontinued because of her history of kidney disease  Will have her come back once a year or as needed    Sheela Ricks MD 06/05/25 2:50 PM

## 2025-06-09 ENCOUNTER — OFFICE VISIT (OUTPATIENT)
Dept: PRIMARY CARE | Facility: CLINIC | Age: 80
End: 2025-06-09
Payer: MEDICARE

## 2025-06-09 VITALS
SYSTOLIC BLOOD PRESSURE: 126 MMHG | DIASTOLIC BLOOD PRESSURE: 42 MMHG | BODY MASS INDEX: 24.23 KG/M2 | HEIGHT: 60 IN | HEART RATE: 71 BPM | WEIGHT: 123.4 LBS | OXYGEN SATURATION: 95 %

## 2025-06-09 DIAGNOSIS — Z00.00 ROUTINE ADULT HEALTH MAINTENANCE: ICD-10-CM

## 2025-06-09 DIAGNOSIS — H10.32 ACUTE BACTERIAL CONJUNCTIVITIS OF LEFT EYE: Primary | ICD-10-CM

## 2025-06-09 PROCEDURE — 1036F TOBACCO NON-USER: CPT | Performed by: NURSE PRACTITIONER

## 2025-06-09 PROCEDURE — 99213 OFFICE O/P EST LOW 20 MIN: CPT | Performed by: NURSE PRACTITIONER

## 2025-06-09 PROCEDURE — 3074F SYST BP LT 130 MM HG: CPT | Performed by: NURSE PRACTITIONER

## 2025-06-09 PROCEDURE — 3078F DIAST BP <80 MM HG: CPT | Performed by: NURSE PRACTITIONER

## 2025-06-09 PROCEDURE — 1159F MED LIST DOCD IN RCRD: CPT | Performed by: NURSE PRACTITIONER

## 2025-06-09 PROCEDURE — G2211 COMPLEX E/M VISIT ADD ON: HCPCS | Performed by: NURSE PRACTITIONER

## 2025-06-09 RX ORDER — ERYTHROMYCIN 5 MG/G
OINTMENT OPHTHALMIC 4 TIMES DAILY
Qty: 3.5 G | Refills: 0 | Status: SHIPPED | OUTPATIENT
Start: 2025-06-09 | End: 2025-06-10 | Stop reason: WASHOUT

## 2025-06-09 ASSESSMENT — ENCOUNTER SYMPTOMS
MYALGIAS: 0
TROUBLE SWALLOWING: 0
FEVER: 0
WOUND: 0
WHEEZING: 0
DIFFICULTY URINATING: 0
EYE REDNESS: 1
COLOR CHANGE: 0
COUGH: 0
HEADACHES: 0
ADENOPATHY: 0
CONSTIPATION: 0
NAUSEA: 0
DIZZINESS: 0
BACK PAIN: 1
JOINT SWELLING: 0
ARTHRALGIAS: 1
EYE PAIN: 0
BRUISES/BLEEDS EASILY: 0
WEAKNESS: 0
EYE DISCHARGE: 1
PALPITATIONS: 0
ABDOMINAL PAIN: 0
FATIGUE: 0
SHORTNESS OF BREATH: 0
CHILLS: 0
ABDOMINAL DISTENTION: 0
SEIZURES: 0
DIARRHEA: 0

## 2025-06-09 NOTE — PROGRESS NOTES
"Subjective   Patient ID: Vicky Govea is a 80 y.o. female who presents for Facial Swelling (Pt states left eye swelling and red, crust and sometimes drainage and hurts a little bit. Symptoms started about 1 week ago.).    Patient seen today due to acute left eye concerns.  Present for today's visit is her spouse.  She states that for the past week, she has had some worsening redness, drainage and irritation.  Patient reports that symptoms originally started as \"grit\" in her eye but have progressed.  She denies any fever, chills or other systemic concerns.  No visual changes noted as well.  Patient denies any known contacts but states that she goes to the local [facility] frequently for working out.  Patient states that she is scheduled to have cataract removal surgery later this week.  Medications reviewed.  No other acute concerns voiced at this time.        Current Outpatient Medications on File Prior to Visit   Medication Sig Dispense Refill    amLODIPine (Norvasc) 10 mg tablet Take 1 tablet (10 mg) by mouth once daily. 90 tablet 1    atorvastatin (Lipitor) 10 mg tablet TAKE 1 TABLET DAILY AT BEDTIME 90 tablet 3    calcium carbonate-vitamin D3 500 mg-5 mcg (200 unit) tablet Take 1 tablet by mouth once daily.      cholecalciferol (Vitamin D-3) 50 mcg (2,000 unit) capsule Take 1 capsule (50 mcg) by mouth early in the morning..      clopidogrel (Plavix) 75 mg tablet Take 1 tablet (75 mg) by mouth once daily.      cranberry fruit 400 mg tablet Take 1 tablet by mouth once daily.      empagliflozin (Jardiance) 10 mg tablet Take 1 tablet (10 mg) by mouth once daily. 90 tablet 3    ferrous sulfate, 325 mg ferrous sulfate, tablet Take 1 tablet (325 mg) by mouth 3 times a week. 3 times weekly with breakfast 36 tablet 3    gabapentin (Neurontin) 100 mg capsule Take 1 capsule (100 mg) by mouth once daily as needed (spondylolisthesis). Takes occasionally 90 capsule 0    hydroxychloroquine (Plaquenil) 200 mg tablet TAKE 1 TABLET " ONCE DAILY 90 tablet 3    lisinopril 20 mg tablet Take 1 tablet (20 mg) by mouth 2 times a day. 180 tablet 0    MAGNESIUM GLYCINATE ORAL Take 200 mg by mouth early in the morning. PATIENT HAS MAGNESIUM GLYCINATE IN CABINET NOT MAGNESIUM OXIDE LISTED ON DC MEDS  Indications: vitamin deficiency      multivitamin tablet Take 1 tablet by mouth once daily.      vit A/vit C/vit E/zinc/copper (PRESERVISION AREDS ORAL) Take 1 tablet by mouth 2 times a day.      celecoxib (CeleBREX) 200 mg capsule Take 1 capsule (200 mg) by mouth early in the morning.. (Patient taking differently: Take 1 capsule (200 mg) by mouth once daily as needed for moderate pain (4 - 6).)      dexlansoprazole (Dexilant) 30 mg DR capsule Take 1 capsule (30 mg) by mouth once daily. Do not crush or chew. (Patient taking differently: Take 1 capsule (30 mg) by mouth if needed (gerd). Do not crush or chew.  Take 30 or 60mg PRN) 30 capsule 11    vibegron (Gemtesa) 75 mg tablet Take 1 tablet (75 mg) by mouth once daily. [id number]  1/29 (Patient not taking: Reported on 6/9/2025) 84 tablet 0     No current facility-administered medications on file prior to visit.       Past Medical History:   Diagnosis Date    Abnormal urine findings 05/22/2023    Acquired spondylolisthesis     Acute kidney injury 05/22/2023    Repeat cbc,cmp    Acute non-recurrent frontal sinusitis 08/16/2023    - augmentin twice a day 7 days  -  Nasal saline, increase fluids  -  hand hygiene and infection prevention discussed  -  Warm compress to sinuses  - humidification  - recommend to eat yogurt twice daily while taking antibiotic or a daily probiotic       Acute pharyngitis due to other specified organisms 05/03/2019    Acute bacterial pharyngitis    Age-related nuclear cataract of left eye 05/22/2023    Age-related nuclear cataract of right eye 05/22/2023    Allergic     Allergic contact dermatitis due to plants, except food 06/11/2018    Contact dermatitis due to poison oak    Allergic  reaction to contrast dye 09/12/2023    Angina pectoris 08/16/2023    Arthritis     Arthritis of right knee 05/22/2023    Back pain 09/12/2023    Benign paroxysmal positional vertigo 07/08/2021    Body mass index (BMI) 26.0-26.9, adult 06/17/2021    Body mass index (BMI) of 26.0 to 26.9 in adult    Calculus of kidney 12/28/2015    Recurrent kidney stones    Carpal tunnel syndrome of left wrist 05/22/2023    Carpal tunnel syndrome of right wrist 05/22/2023    Cataract, nuclear sclerotic, both eyes 05/22/2023    Cheilitis 09/02/2021    Chronic epigastric pain 09/12/2023    Chronic kidney disease     Chronic low back pain 05/22/2023    Colitis 09/12/2023    Contusion of right hand 05/22/2023    Contusion of right hand, initial encounter 10/13/2021    Contusion of right hand, initial encounter    Difficulty in walking, not elsewhere classified 03/19/2020    Difficulty walking    Difficulty walking 05/22/2023    Diverticulitis of intestine, part unspecified, without perforation or abscess without bleeding 11/15/2018    Acute diverticulitis    Diverticulosis of large intestine without perforation or abscess without bleeding 10/22/2021    Diverticulosis of colon    Dorsalgia, unspecified 11/15/2018    Acute back pain    Drusen (degenerative) of macula, bilateral 05/22/2023    Dry eye syndrome of left lacrimal gland 06/08/2020    Dry eye syndrome of left lacrimal gland    Dry eyes     Encounter for follow-up examination after completed treatment for conditions other than malignant neoplasm 03/04/2016    Hospital discharge follow-up    Epigastric pain 03/03/2016    Dyspepsia    Esophageal reflux 05/22/2023    Estrogen deficiency 05/22/2023    Eyelid abnormality 05/22/2023    Gait difficulty 05/22/2023    Gastritis 09/12/2023    Gastro-esophageal reflux disease with esophagitis, without bleeding 10/30/2020    Reflux esophagitis    Generalized abdominal pain 11/19/2018    Acute generalized abdominal pain    Hemangioma of skin  and subcutaneous tissue 09/02/2021    Hiatal hernia 05/22/2023    Hip arthritis 09/17/2013    History of total left knee replacement 05/22/2023    Hyperlipidemia     Hypermetropia, bilateral 07/08/2022    Hypermetropia of both eyes    Hyperopia of both eyes 05/22/2023    Hypertension     Hypomagnesemia 06/17/2021    Hypomagnesemia    IBS (irritable bowel syndrome)     Idiopathic neuropathy     Inflamed seborrheic keratosis 09/02/2021    Injury of finger of right hand 05/22/2023    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    Lentigines 09/02/2021    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    Lumbar radicular pain 07/10/2013    Lupus     Macular degeneration     bilaterally    Mass of joint of left wrist 05/22/2023    Melanocytic nevi of trunk 09/02/2021    Melanocytic nevi of unspecified lower limb, including hip 09/02/2021    Melanocytic nevi of unspecified upper limb, including shoulder 09/02/2021    Menopausal symptom 09/12/2023    Mild vitamin D deficiency 05/22/2023    Muscle spasm of back 05/01/2017    Back muscle spasm    Myalgia due to statin 05/22/2023    Neoplasm of uncertain behavior of skin 09/02/2021    Neurogenic 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 05/22/2023    Ocular rosacea 05/22/2023    Ocular rosacea 05/22/2023    Osteoarthritis, hand 05/22/2023    Osteoarthritis, knee 02/13/2015    Osteopenia 09/12/2023    Osteoporosis     Other long term (current) drug therapy 07/11/2022    Encounter for long-term current use of high risk medication    Other nail disorders 10/09/2018    Leukonychia striata    Other reduced mobility 03/19/2020    Impaired mobility and ADLs    Other specified abnormal findings of blood chemistry 10/09/2018    Elevated serum creatinine    Other specified joint disorders, left wrist 02/08/2021    Mass of joint of left wrist    Other specified postprocedural states 09/19/2017    History of colonoscopy    Other symptoms and signs involving the musculoskeletal system  07/16/2021    Wrist weakness    Other symptoms and signs involving the musculoskeletal system 10/28/2019    Weakness of both lower extremities    Overweight 10/26/2021    Overweight with body mass index (BMI) of 26 to 26.9 in adult    Pain due to internal orthopedic prosthetic devices, implants and grafts, initial encounter 01/14/2020    Pain due to total knee replacement, initial encounter    Pain in both hands 05/22/2023    Pain in both wrists 05/22/2023    Pain in left hip 06/25/2021    Pain of left hip joint    Pain in left knee 04/16/2020    Left knee pain    Pain in right hand 07/21/2021    Pain in both hands    Pelvis fracture (Multi) 5/23/2002    Personal history of diseases of the skin and subcutaneous tissue 06/26/2017    History of urticaria    Personal history of diseases of the skin and subcutaneous tissue 09/06/2018    History of contact dermatitis    Personal history of other diseases of the musculoskeletal system and connective tissue 02/06/2020    History of muscle pain    Personal history of other diseases of the nervous system and sense organs 01/05/2017    History of sleep disturbance    Personal history of other diseases of the respiratory system 05/11/2018    History of acute sinusitis    Personal history of other endocrine, nutritional and metabolic disease 04/27/2020    History of estrogen deficiency    Personal history of other specified conditions 03/03/2016    History of epigastric pain    Personal history of other specified conditions 10/25/2022    History of weakness    Personal history of other specified conditions 07/05/2022    History of epigastric pain    Personal history of other specified conditions 10/28/2019    History of gait disorder    Personal history of transient ischemic attack (TIA), and cerebral infarction without residual deficits 12/28/2015    History of stroke    Pleurodynia 12/15/2019    Rib pain on left side    Presence of unspecified artificial hip joint 05/15/2020     Status post revision of total hip replacement    Rash of face 05/22/2023    Renal mass, right 05/22/2023    Rib pain on left side 05/22/2023    Rib pain on right side 05/22/2023    Xray chest    Right upper quadrant pain 12/18/2017    Abdominal pain, RUQ    Rosacea 05/22/2023    Scar adherent 05/22/2023    Scar condition and fibrosis of skin 09/02/2021    Scar conditions and fibrosis of skin 07/02/2021    Scar adherent    Seborrheic dermatitis, unspecified 09/02/2021    Spinal stenosis of lumbar region 07/10/2013    Stroke (Multi)     Trigger finger, acquired 05/22/2023    Trochanteric bursitis, left hip 05/15/2020    Greater trochanteric bursitis of left hip    Ulcerative blepharitis 05/22/2023    Vitamin D deficiency, unspecified 06/09/2016    Hypovitaminosis D    Weakness of both lower extremities 05/22/2023    Wrist stiffness 05/22/2023    Wrist weakness 05/22/2023        Past Surgical History:   Procedure Laterality Date    CARPAL TUNNEL RELEASE Bilateral     EYE SURGERY      HIP SURGERY  12/28/2015    Hip Surgery    HYSTERECTOMY  12/28/2015    Hysterectomy    JOINT REPLACEMENT Left     TKA    KIDNEY STONE SURGERY      MR HEAD ANGIO WO IV CONTRAST  12/29/2012    MR HEAD ANGIO WO IV CONTRAST [facility]    MR NECK ANGIO WO IV CONTRAST  12/29/2012    MR NECK ANGIO WO IV CONTRAST [facility]    TOTAL HIP ARTHROPLASTY  12/28/2015    Hip Replacement    TRIGGER FINGER RELEASE          Family History   Problem Relation Name Age of Onset    Diabetes Mother Shawanda     Arthritis Mother Shawanda     Stroke Mother Shawanda     Other (pacemaker) Father      Breast cancer Other grandmother     Breast cancer Sibling      Cataracts Sister Shawanda Gilman     Macular degeneration Sister Shawanda Gilman     Cataracts Sister Shawanda Gilman     Macular degeneration Sister Shawanda Gilman         Review of Systems   Constitutional:  Negative for chills, fatigue and fever.   HENT:  Negative for dental problem and trouble swallowing.     Eyes:  Positive for discharge and redness. Negative for pain and visual disturbance.        Wears glasses; Positive for cataracts. See HPI   Respiratory:  Negative for cough, shortness of breath and wheezing.    Cardiovascular:  Negative for chest pain, palpitations and leg swelling.   Gastrointestinal:  Negative for abdominal distention, abdominal pain, constipation, diarrhea and nausea.        Positive for IBS-D   Endocrine: Negative for cold intolerance and heat intolerance.   Genitourinary:  Negative for difficulty urinating.        Positive for nocturia    Musculoskeletal:  Positive for arthralgias and back pain. Negative for gait problem, joint swelling and myalgias.        Positive for polyarthalgia.    Skin:  Negative for color change, pallor, rash and wound.   Allergic/Immunologic: Negative for environmental allergies and food allergies.   Neurological:  Negative for dizziness, seizures, weakness and headaches.   Hematological:  Negative for adenopathy. Does not bruise/bleed easily.   Psychiatric/Behavioral:  Negative for behavioral problems.    All other systems reviewed and are negative.      Objective   BP (!) 126/42 (BP Location: Right arm, Patient Position: Sitting)   Pulse 71   Ht 1.524 m (5')   Wt 56 kg (123 lb 6.4 oz)   LMP  (LMP Unknown)   SpO2 95%   BMI 24.10 kg/m²     Physical Exam  Constitutional:       General: She is not in acute distress.     Appearance: Normal appearance. She is not toxic-appearing.   HENT:      Head: Normocephalic and atraumatic.      Right Ear: External ear normal.      Left Ear: External ear normal.      Nose: Nose normal.      Mouth/Throat:      Mouth: Mucous membranes are moist.      Pharynx: Oropharynx is clear.   Eyes:      General:         Left eye: Discharge present.     Extraocular Movements: Extraocular movements intact.      Conjunctiva/sclera: Conjunctivae normal.      Comments: Erythema, drainage   Pulmonary:      Effort: Pulmonary effort is normal.    Musculoskeletal:         General: No swelling.      Cervical back: Normal range of motion and neck supple.   Skin:     General: Skin is warm and dry.   Neurological:      General: No focal deficit present.      Mental Status: She is alert and oriented to person, place, and time. Mental status is at baseline.      Cranial Nerves: No cranial nerve deficit.      Motor: No weakness.   Psychiatric:         Mood and Affect: Mood normal.         Behavior: Behavior normal.         Thought Content: Thought content normal.         Judgment: Judgment normal.         Assessment/Plan   Problem List Items Addressed This Visit           ICD-10-CM    Routine adult health maintenance Z00.00    Most recent blood work reviewed.  Will continue to monitor as appropriate.  -Most recent colonoscopy completed in may, 2023 with recommendations that no additional screening colonoscopies are indicated given age and lack of symptoms  -DEXA completed in 2023 and was positive for osteoporosis.  Patient's previous provider recommended increased calcium and vitamin D supplements in addition to weightbearing activities.  Will rescreen patient in 2025         Acute bacterial conjunctivitis of left eye - Primary H10.32    Will initiate course of ophthalmology antibiotics.  Patient instructed to utilize good hand hygiene and change her linens frequently and notify provider for any persistent/worsening infection concerns.  Patient also instructed to notify ophthalmology specialist regarding upcoming cataract removal surgery.  Patient voiced understanding and is in agreement to plan of care         Relevant Medications    erythromycin (Romycin) 5 mg/gram (0.5 %) ophthalmic ointment

## 2025-06-09 NOTE — ASSESSMENT & PLAN NOTE
Will initiate course of ophthalmology antibiotics.  Patient instructed to utilize good hand hygiene and change her linens frequently and notify provider for any persistent/worsening infection concerns.  Patient also instructed to notify ophthalmology specialist regarding upcoming cataract removal surgery.  Patient voiced understanding and is in agreement to plan of care

## 2025-06-10 ENCOUNTER — TELEPHONE (OUTPATIENT)
Dept: PRIMARY CARE | Facility: CLINIC | Age: 80
End: 2025-06-10
Payer: MEDICARE

## 2025-06-10 ENCOUNTER — OFFICE VISIT (OUTPATIENT)
Dept: OPHTHALMOLOGY | Age: 80
End: 2025-06-10
Payer: MEDICARE

## 2025-06-10 DIAGNOSIS — H10.32 ACUTE BACTERIAL CONJUNCTIVITIS OF LEFT EYE: Primary | ICD-10-CM

## 2025-06-10 DIAGNOSIS — H02.88B MEIBOMIAN GLAND DISEASE OF UPPER AND LOWER EYELIDS OF BOTH EYES: Primary | ICD-10-CM

## 2025-06-10 DIAGNOSIS — H00.014 HORDEOLUM EXTERNUM OF LEFT UPPER EYELID: ICD-10-CM

## 2025-06-10 DIAGNOSIS — H02.88A MEIBOMIAN GLAND DISEASE OF UPPER AND LOWER EYELIDS OF BOTH EYES: Primary | ICD-10-CM

## 2025-06-10 PROCEDURE — 99213 OFFICE O/P EST LOW 20 MIN: CPT | Performed by: OPHTHALMOLOGY

## 2025-06-10 RX ORDER — POLYMYXIN B SULFATE AND TRIMETHOPRIM 1; 10000 MG/ML; [USP'U]/ML
1 SOLUTION OPHTHALMIC 4 TIMES DAILY
Qty: 10 ML | Refills: 0 | Status: SHIPPED | OUTPATIENT
Start: 2025-06-10 | End: 2025-06-15

## 2025-06-10 ASSESSMENT — VISUAL ACUITY
OD_CC: 20/40
METHOD: SNELLEN - LINEAR
CORRECTION_TYPE: GLASSES
OS_CC: 20/50

## 2025-06-10 ASSESSMENT — EXTERNAL EXAM - LEFT EYE: OS_EXAM: NORMAL

## 2025-06-10 ASSESSMENT — ENCOUNTER SYMPTOMS
GASTROINTESTINAL NEGATIVE: 0
EYES NEGATIVE: 1
ENDOCRINE NEGATIVE: 0
NEUROLOGICAL NEGATIVE: 0
MUSCULOSKELETAL NEGATIVE: 0
RESPIRATORY NEGATIVE: 0
CARDIOVASCULAR NEGATIVE: 0
ALLERGIC/IMMUNOLOGIC NEGATIVE: 0
CONSTITUTIONAL NEGATIVE: 0
PSYCHIATRIC NEGATIVE: 0
HEMATOLOGIC/LYMPHATIC NEGATIVE: 0

## 2025-06-10 ASSESSMENT — SLIT LAMP EXAM - LIDS: COMMENTS: GOOD POSITION, MGD

## 2025-06-10 ASSESSMENT — EXTERNAL EXAM - RIGHT EYE: OD_EXAM: NORMAL

## 2025-06-10 NOTE — TELEPHONE ENCOUNTER
Pt calling states that the eye ointment is to messy she was wondering if there was an eye drop she could get instead?     BALDEMAR Nichols

## 2025-06-12 ENCOUNTER — ANESTHESIA EVENT (OUTPATIENT)
Dept: OPERATING ROOM | Facility: CLINIC | Age: 80
End: 2025-06-12
Payer: MEDICARE

## 2025-06-12 ENCOUNTER — HOSPITAL ENCOUNTER (OUTPATIENT)
Facility: CLINIC | Age: 80
Setting detail: OUTPATIENT SURGERY
Discharge: HOME | End: 2025-06-12
Attending: OPHTHALMOLOGY | Admitting: OPHTHALMOLOGY
Payer: MEDICARE

## 2025-06-12 ENCOUNTER — ANESTHESIA (OUTPATIENT)
Dept: OPERATING ROOM | Facility: CLINIC | Age: 80
End: 2025-06-12
Payer: MEDICARE

## 2025-06-12 VITALS
DIASTOLIC BLOOD PRESSURE: 60 MMHG | HEART RATE: 67 BPM | BODY MASS INDEX: 24.41 KG/M2 | WEIGHT: 124.34 LBS | HEIGHT: 60 IN | TEMPERATURE: 98.2 F | OXYGEN SATURATION: 97 % | RESPIRATION RATE: 16 BRPM | SYSTOLIC BLOOD PRESSURE: 130 MMHG

## 2025-06-12 DIAGNOSIS — K21.9 GASTROESOPHAGEAL REFLUX DISEASE, UNSPECIFIED WHETHER ESOPHAGITIS PRESENT: ICD-10-CM

## 2025-06-12 DIAGNOSIS — H25.813 COMBINED FORM OF AGE-RELATED CATARACT, BOTH EYES: Primary | ICD-10-CM

## 2025-06-12 DIAGNOSIS — H25.811 COMBINED FORM OF AGE-RELATED CATARACT, RIGHT EYE: ICD-10-CM

## 2025-06-12 PROCEDURE — 3600000003 HC OR TIME - INITIAL BASE CHARGE - PROCEDURE LEVEL THREE: Performed by: OPHTHALMOLOGY

## 2025-06-12 PROCEDURE — 2720000007 HC OR 272 NO HCPCS: Performed by: OPHTHALMOLOGY

## 2025-06-12 PROCEDURE — 66984 XCAPSL CTRC RMVL W/O ECP: CPT | Performed by: OPHTHALMOLOGY

## 2025-06-12 PROCEDURE — 2500000005 HC RX 250 GENERAL PHARMACY W/O HCPCS: Performed by: OPHTHALMOLOGY

## 2025-06-12 PROCEDURE — A66982 PR REMV CATARACT EXTRACAP,INSERT LENS,COMP: Performed by: ANESTHESIOLOGY

## 2025-06-12 PROCEDURE — A66982 PR REMV CATARACT EXTRACAP,INSERT LENS,COMP

## 2025-06-12 PROCEDURE — 7100000009 HC PHASE TWO TIME - INITIAL BASE CHARGE: Performed by: OPHTHALMOLOGY

## 2025-06-12 PROCEDURE — 99100 ANES PT EXTEME AGE<1 YR&>70: CPT | Performed by: ANESTHESIOLOGY

## 2025-06-12 PROCEDURE — 3700000002 HC GENERAL ANESTHESIA TIME - EACH INCREMENTAL 1 MINUTE: Performed by: OPHTHALMOLOGY

## 2025-06-12 PROCEDURE — 2500000004 HC RX 250 GENERAL PHARMACY W/ HCPCS (ALT 636 FOR OP/ED): Performed by: OPHTHALMOLOGY

## 2025-06-12 PROCEDURE — 2500000004 HC RX 250 GENERAL PHARMACY W/ HCPCS (ALT 636 FOR OP/ED)

## 2025-06-12 PROCEDURE — 3600000008 HC OR TIME - EACH INCREMENTAL 1 MINUTE - PROCEDURE LEVEL THREE: Performed by: OPHTHALMOLOGY

## 2025-06-12 PROCEDURE — 2760000001 HC OR 276 NO HCPCS: Performed by: OPHTHALMOLOGY

## 2025-06-12 PROCEDURE — 3700000001 HC GENERAL ANESTHESIA TIME - INITIAL BASE CHARGE: Performed by: OPHTHALMOLOGY

## 2025-06-12 PROCEDURE — V2632 POST CHMBR INTRAOCULAR LENS: HCPCS | Performed by: OPHTHALMOLOGY

## 2025-06-12 PROCEDURE — 7100000010 HC PHASE TWO TIME - EACH INCREMENTAL 1 MINUTE: Performed by: OPHTHALMOLOGY

## 2025-06-12 DEVICE — CLAREON ASPHERIC HYDROPHOBIC ACRYLIC IOL WITH THE AUTONOMEAUTOMATED PRE-LOADED DELIVERY SYSTEM
Type: IMPLANTABLE DEVICE | Site: EYE | Status: FUNCTIONAL
Brand: CLAREON™

## 2025-06-12 RX ORDER — TETRACAINE HYDROCHLORIDE 5 MG/ML
1 SOLUTION OPHTHALMIC ONCE
Status: COMPLETED | OUTPATIENT
Start: 2025-06-12 | End: 2025-06-12

## 2025-06-12 RX ORDER — CYCLOPENTOLATE HYDROCHLORIDE 10 MG/ML
1 SOLUTION/ DROPS OPHTHALMIC
Status: COMPLETED | OUTPATIENT
Start: 2025-06-12 | End: 2025-06-12

## 2025-06-12 RX ORDER — OXYCODONE AND ACETAMINOPHEN 5; 325 MG/1; MG/1
1 TABLET ORAL EVERY 4 HOURS PRN
Refills: 0 | Status: CANCELLED | OUTPATIENT
Start: 2025-06-12

## 2025-06-12 RX ORDER — NEOMYCIN SULFATE, POLYMYXIN B SULFATE AND DEXAMETHASONE 3.5; 10000; 1 MG/ML; [USP'U]/ML; MG/ML
SUSPENSION/ DROPS OPHTHALMIC AS NEEDED
Status: DISCONTINUED | OUTPATIENT
Start: 2025-06-12 | End: 2025-06-12 | Stop reason: HOSPADM

## 2025-06-12 RX ORDER — MOXIFLOXACIN 5 MG/ML
1 SOLUTION/ DROPS OPHTHALMIC 4 TIMES DAILY
Status: SHIPPED | OUTPATIENT
Start: 2025-06-12

## 2025-06-12 RX ORDER — LIDOCAINE HYDROCHLORIDE 10 MG/ML
0.1 INJECTION, SOLUTION EPIDURAL; INFILTRATION; INTRACAUDAL; PERINEURAL ONCE
Status: CANCELLED | OUTPATIENT
Start: 2025-06-12 | End: 2025-06-12

## 2025-06-12 RX ORDER — MIDAZOLAM HYDROCHLORIDE 1 MG/ML
1 INJECTION, SOLUTION INTRAMUSCULAR; INTRAVENOUS ONCE AS NEEDED
Status: CANCELLED | OUTPATIENT
Start: 2025-06-12

## 2025-06-12 RX ORDER — TETRACAINE HYDROCHLORIDE 5 MG/ML
SOLUTION OPHTHALMIC AS NEEDED
Status: DISCONTINUED | OUTPATIENT
Start: 2025-06-12 | End: 2025-06-12 | Stop reason: HOSPADM

## 2025-06-12 RX ORDER — ONDANSETRON HYDROCHLORIDE 2 MG/ML
INJECTION, SOLUTION INTRAVENOUS AS NEEDED
Status: DISCONTINUED | OUTPATIENT
Start: 2025-06-12 | End: 2025-06-12

## 2025-06-12 RX ORDER — TROPICAMIDE 10 MG/ML
1 SOLUTION/ DROPS OPHTHALMIC
Status: COMPLETED | OUTPATIENT
Start: 2025-06-12 | End: 2025-06-12

## 2025-06-12 RX ORDER — PHENYLEPHRINE HYDROCHLORIDE 25 MG/ML
1 SOLUTION/ DROPS OPHTHALMIC
Status: COMPLETED | OUTPATIENT
Start: 2025-06-12 | End: 2025-06-12

## 2025-06-12 RX ORDER — PREDNISOLONE ACETATE 10 MG/ML
1 SUSPENSION/ DROPS OPHTHALMIC 4 TIMES DAILY
Qty: 5 ML | Refills: 1 | Status: SHIPPED | OUTPATIENT
Start: 2025-06-12

## 2025-06-12 RX ORDER — ACETAMINOPHEN 325 MG/1
650 TABLET ORAL ONCE
Status: CANCELLED | OUTPATIENT
Start: 2025-06-12 | End: 2025-06-12

## 2025-06-12 RX ORDER — MOXIFLOXACIN 5 MG/ML
1 SOLUTION/ DROPS OPHTHALMIC 4 TIMES DAILY
Qty: 5 ML | Refills: 0 | Status: SHIPPED | OUTPATIENT
Start: 2025-06-12

## 2025-06-12 RX ORDER — PREDNISOLONE ACETATE 10 MG/ML
1 SUSPENSION/ DROPS OPHTHALMIC 4 TIMES DAILY
Status: SHIPPED | OUTPATIENT
Start: 2025-06-12

## 2025-06-12 RX ORDER — METOCLOPRAMIDE HYDROCHLORIDE 5 MG/ML
10 INJECTION INTRAMUSCULAR; INTRAVENOUS ONCE AS NEEDED
Status: CANCELLED | OUTPATIENT
Start: 2025-06-12

## 2025-06-12 RX ORDER — MIDAZOLAM HYDROCHLORIDE 1 MG/ML
INJECTION, SOLUTION INTRAMUSCULAR; INTRAVENOUS AS NEEDED
Status: DISCONTINUED | OUTPATIENT
Start: 2025-06-12 | End: 2025-06-12

## 2025-06-12 RX ADMIN — CYCLOPENTOLATE HYDROCHLORIDE 1 DROP: 10 SOLUTION/ DROPS OPHTHALMIC at 07:50

## 2025-06-12 RX ADMIN — CYCLOPENTOLATE HYDROCHLORIDE 1 DROP: 10 SOLUTION/ DROPS OPHTHALMIC at 07:55

## 2025-06-12 RX ADMIN — ONDANSETRON 4 MG: 2 INJECTION INTRAMUSCULAR; INTRAVENOUS at 09:07

## 2025-06-12 RX ADMIN — PHENYLEPHRINE HYDROCHLORIDE 1 DROP: 25 SOLUTION/ DROPS OPHTHALMIC at 07:50

## 2025-06-12 RX ADMIN — TROPICAMIDE 1 DROP: 10 SOLUTION/ DROPS OPHTHALMIC at 07:45

## 2025-06-12 RX ADMIN — PHENYLEPHRINE HYDROCHLORIDE 1 DROP: 25 SOLUTION/ DROPS OPHTHALMIC at 07:45

## 2025-06-12 RX ADMIN — TETRACAINE HYDROCHLORIDE 1 DROP: 5 SOLUTION OPHTHALMIC at 07:45

## 2025-06-12 RX ADMIN — TROPICAMIDE 1 DROP: 10 SOLUTION/ DROPS OPHTHALMIC at 07:50

## 2025-06-12 RX ADMIN — TROPICAMIDE 1 DROP: 10 SOLUTION/ DROPS OPHTHALMIC at 07:55

## 2025-06-12 RX ADMIN — PHENYLEPHRINE HYDROCHLORIDE 1 DROP: 25 SOLUTION/ DROPS OPHTHALMIC at 07:55

## 2025-06-12 RX ADMIN — CYCLOPENTOLATE HYDROCHLORIDE 1 DROP: 10 SOLUTION/ DROPS OPHTHALMIC at 07:45

## 2025-06-12 RX ADMIN — MIDAZOLAM HYDROCHLORIDE 1 MG: 1 INJECTION, SOLUTION INTRAMUSCULAR; INTRAVENOUS at 09:07

## 2025-06-12 ASSESSMENT — PAIN SCALES - GENERAL
PAINLEVEL_OUTOF10: 0 - NO PAIN
PAIN_LEVEL: 0

## 2025-06-12 ASSESSMENT — PAIN - FUNCTIONAL ASSESSMENT
PAIN_FUNCTIONAL_ASSESSMENT: 0-10

## 2025-06-12 NOTE — DISCHARGE INSTRUCTIONS
Start the following eye drops in the operative eye:   Keep eye patch and shield on for 4 hours, then can remove eye patch but must leave clear plastic shield on, can take it off to start drops today    Postop instructions:  Prednisolone acetate drops:  4 times/day     Moxifloxacin drops:  4 times/day     Sleep with shield at night for 7 days  No eye rubbing  May shower and wash face but no water inside surgery eye  No lifting any weight above 10lbs  Patient to resume home medications.   Please call immediately if you develop any redness, pain, decreased vision, flashes, floaters.   Office phone (after hours): [phone]   [facility] : [phone] (call this number if unable to reach someone on the phone above) then ask for ophthalmologist on call.        Follow up tomorrow with Dr. Rodríguez  at 10:15  [place]

## 2025-06-12 NOTE — BRIEF OP NOTE
Date: 2025  OR Location: [facility] OR    Name: Vicky Govea, : 1945, Age: 80 y.o., MRN: [id number], Sex: female    Diagnosis  Pre-op Diagnosis      * Combined form of age-related cataract, both eyes [H25.813] Post-op Diagnosis     * Combined form of age-related cataract, both eyes [H25.813]     Procedures  Cataract extraction with intraocular lens implantation  14437 - CA XCAPSL CTRC RMVL INSJ IO LENS PROSTH W/O ECP      Surgeons      * Ronak Rodríguez - Primary    Resident/Fellow/Other Assistant:  Surgeons and Role:  * No surgeons found with a matching role *    Staff:   Circulator: Radha Schmitt Person: Malu Schmitt Person: Lara    Anesthesia Staff: Anesthesiologist: Courtney Jonas MD  CRNA: NATE Gann-CRNA  C-AA: IVETT Delgadillo    Procedure Summary  Anesthesia: Monitor Anesthesia Care  ASA: III  Estimated Blood Loss: 0mL  Intra-op Medications:   Administrations occurring from 0853 to 0938 on 25:   Medication Name Total Dose   balanced salts (BSS) intraocular solution 15 mL   chondroitin sulf-sod hyaluron (Duovisc) intraocular kit 0.5 mL   EPINEPHrine (Adrenalin) 1 mg/mL 0.3 mg in balanced salts (BSS) 500 mL irrigation 0.3 mg   neomycin-polymyxin-dexAMETHasone (Maxitrol) 3.5mg/mL-10,000 unit/mL-0.1 % ophthalmic suspension 2 drop   tetracaine (PF) 0.5 % ophthalmic solution 2 drop   lidocaine PF 2% (Xylocaine) 3 mL, EPINEPHrine (Adrenalin) 1 mL, balanced salts (BSS) 9 mL syringe 1 mL   midazolam (Versed) 1 mg/1 mL 1 mg   ondansetron (Zofran) 2 mg/mL injection 4 mg              Anesthesia Record               Intraprocedure I/O Totals       None           Specimen: No specimens collected               Findings: cataract right eye     Complications:  None; patient tolerated the procedure well.     Disposition: PACU - hemodynamically stable.  Condition: stable  Specimens Collected: No specimens collected  Attending Attestation:     Ronak Rodríguez  Phone Number:  [phone]

## 2025-06-12 NOTE — OP NOTE
Cataract extraction with intraocular lens implantation (R) Operative Note     Date: 2025  OR Location: [facility] OR    Name: Vicky Govea, : 1945, Age: 80 y.o., MRN: [id number], Sex: female    Diagnosis  Pre-op Diagnosis      * Combined form of age-related cataract, both eyes [H25.813] Post-op Diagnosis     * Combined form of age-related cataract, both eyes [H25.813]     Procedures  Cataract extraction with intraocular lens implantation  14550 - IL XCAPSL CTRC RMVL INSJ IO LENS PROSTH W/O ECP      Surgeons      * Ronak Rodríguez - Primary    Resident/Fellow/Other Assistant:  Surgeons and Role:  * No surgeons found with a matching role *    Staff:   Circulator: Radha Bullockub Person: Malu Bullockub Person: Lara    Anesthesia Staff: Anesthesiologist: Courtney Jonas MD  CRNA: NATE Gann-CRNA  C-AA: IVETT Delgadillo    Procedure Summary  Anesthesia: Monitor Anesthesia Care  ASA: III  Estimated Blood Loss: minimal mL  Intra-op Medications:   Administrations occurring from 0853 to 0938 on 25:   Medication Name Total Dose   balanced salts (BSS) intraocular solution 15 mL   chondroitin sulf-sod hyaluron (Duovisc) intraocular kit 0.5 mL   EPINEPHrine (Adrenalin) 1 mg/mL 0.3 mg in balanced salts (BSS) 500 mL irrigation 0.3 mg   neomycin-polymyxin-dexAMETHasone (Maxitrol) 3.5mg/mL-10,000 unit/mL-0.1 % ophthalmic suspension 2 drop   tetracaine (PF) 0.5 % ophthalmic solution 2 drop   lidocaine PF 2% (Xylocaine) 3 mL, EPINEPHrine (Adrenalin) 1 mL, balanced salts (BSS) 9 mL syringe 1 mL   midazolam (Versed) 1 mg/1 mL 1 mg   ondansetron (Zofran) 2 mg/mL injection 4 mg              Anesthesia Record               Intraprocedure I/O Totals       None           Specimen: No specimens collected              Drains and/or Catheters: * None in log *    Tourniquet Times:         Implants:  Implants       Type Name Action Serial No.       6.0MM X 13MM CLAREON 1-PIECE ASPHERIC MONOFOCAL  INTRAOCULAR LENS, 20.5 DIOPTER, W/AUTONOME AUTOMATED PRE-LOADED DELIVERY SYSTEM, HYDROPHOBIC ACRYLIC Implanted [id number]              Findings: Right cataract    Indications: Vicky Govea is an 80 y.o. female who is having surgery for Combined form of age-related cataract, both eyes [H25.813].     The patient was seen in the preoperative area. The risks, benefits, complications, treatment options, non-operative alternatives, expected recovery and outcomes were discussed with the patient. The possibilities of reaction to medication, pulmonary aspiration, injury to surrounding structures, bleeding, recurrent infection, the need for additional procedures, failure to diagnose a condition, and creating a complication requiring transfusion or operation were discussed with the patient. The patient concurred with the proposed plan, giving informed consent.  The site of surgery was properly noted/marked if necessary per policy. The patient has been actively warmed in preoperative area. Preoperative antibiotics are not indicated. Venous thrombosis prophylaxis are not indicated.    Procedure Details: The patient was placed in the supine position on the operating room table where appropriate blood pressure and cardiac monitoring were initiated. The patient was prepped and draped in the usual sterile fashion for intraocular surgery. This included instillation of Betadine 5% onto the ocular surface followed by irrigation with balance salt solution a minute or two later. A lid speculum was placed and the operating microscope was positioned. One paracentesis stab incision was made to the left of the planned cataract incision with a 15-degree supersharp blade. 1 ml of preservative free lidocaine was injected into the AC. Viscoat was used to replace the aqueous humor. A temporal clear corneal wound was fashioned beginning at the limbus with a 2.4 mm keratome, extending 2 mm into clear cornea before entering the anterior chamber.  A continuous tear circular capsulorhexis of approximately 5 mm in diameter was performed. Hydrodissection was performed using a Celaya canula. The endothelium was coated with viscoat again. Using the Ozil handpiece on the Faveeo Lens Removal System, the nucleus was emulsified and aspirated using a divide-and-conquer technique. Residual cortex was removed from the eye with the irrigation/aspiration bimanually. ProVisc was used to inflate the capsular bag. The lens implant was inspected and found to be free of visible defects. The lens used was an Gadiel model CNA0T0, power 20.50 diopter intraocular lens. The lens was inserted into the capsular bag using the [place] insertion mechanism. The lens was centered with a Jackson hook. Residual viscoelastic was removed from the eye with the irrigation/aspiration instrument. The wounds were checked and found to be watertight. The lid speculum was removed and the eye was dressed with Maxitrol ointment, eye pad, tape, and shield. The patient tolerated the procedure well, and there were no complications.    Evidence of Infection: No   Complications:  None; patient tolerated the procedure well.    Disposition: PACU - hemodynamically stable.  Condition: stable                 Additional Details: CDE 9.63    Attending Attestation: I performed the procedure.    Ronak Rordíguez  Phone Number: [phone]

## 2025-06-12 NOTE — ANESTHESIA POSTPROCEDURE EVALUATION
Patient: Vicky Govea    Procedure Summary       Date: 06/12/25 Room / Location: [facility] / [facility]    Anesthesia Start: 0900 Anesthesia Stop: 0934    Procedure: Cataract extraction with intraocular lens implantation (Right: Eye) Diagnosis:       Combined form of age-related cataract, both eyes      (Combined form of age-related cataract, both eyes [H25.813])    Surgeons: Ronak Rodríguez MD Responsible Provider: Courtney Jonas MD    Anesthesia Type: MAC ASA Status: 3            Anesthesia Type: MAC    Vitals Value Taken Time   /62 06/12/25 09:32   Temp 36.8 °C (98.2 °F) 06/12/25 09:32   Pulse 67 06/12/25 09:32   Resp 16 06/12/25 09:32   SpO2 100 % 06/12/25 09:32       Anesthesia Post Evaluation    Patient location during evaluation: PACU  Patient participation: complete - patient participated  Level of consciousness: awake  Pain score: 0  Pain management: adequate  Airway patency: patent  Cardiovascular status: acceptable  Respiratory status: acceptable  Hydration status: acceptable  Postoperative Nausea and Vomiting: none        There were no known notable events for this encounter.

## 2025-06-12 NOTE — H&P
History Of Present Illness  Vicky Govea is a 80 y.o. female presenting with blurry vision here for Cataract extraction with intraocular lens (IOL) implantation  right eye .     Past Medical History  Medical History[1]    Surgical History  Surgical History[2]     Social History  She reports that she has never smoked. She has never used smokeless tobacco. She reports that she does not currently use alcohol. She reports that she does not use drugs.    Family History  Family History[3]     Allergies  Benzocaine, Simvastatin, and Tramadol    Review of Systems   All other systems reviewed and are negative.       Physical Exam   Physical exam:  Head: normocephalic  Eyes: see clinic note  Respiratory: per anesthesia  Cardiovascular: per anesthesia  Neuro: alert and interactive     Last Recorded Vitals  Blood pressure 143/67, pulse 69, temperature 36.2 °C (97.2 °F), temperature source Temporal, resp. rate 16, height 1.524 m (5'), weight 56.4 kg (124 lb 5.4 oz), SpO2 99%.         Assessment & Plan  Combined form of age-related cataract, both eyes      Vicky Govea is a 80 y.o. female presenting with blurry vision here for Cataract extraction with intraocular lens (IOL) implantation  right eye .    Proceed w plan as above        Kristy De Jesus MD         [1]   Past Medical History:  Diagnosis Date    Abnormal urine findings 05/22/2023    Acquired spondylolisthesis     Acute kidney injury 05/22/2023    Repeat cbc,cmp    Acute non-recurrent frontal sinusitis 08/16/2023    - augmentin twice a day 7 days  -  Nasal saline, increase fluids  -  hand hygiene and infection prevention discussed  -  Warm compress to sinuses  - humidification  - recommend to eat yogurt twice daily while taking antibiotic or a daily probiotic       Acute pharyngitis due to other specified organisms 05/03/2019    Acute bacterial pharyngitis    Age-related nuclear cataract of left eye 05/22/2023    Age-related nuclear cataract of right eye 05/22/2023     Allergic     Allergic contact dermatitis due to plants, except food 06/11/2018    Contact dermatitis due to poison oak    Allergic reaction to contrast dye 09/12/2023    Angina pectoris 08/16/2023    Arthritis     Arthritis of right knee 05/22/2023    Back pain 09/12/2023    Benign paroxysmal positional vertigo 07/08/2021    Body mass index (BMI) 26.0-26.9, adult 06/17/2021    Body mass index (BMI) of 26.0 to 26.9 in adult    Calculus of kidney 12/28/2015    Recurrent kidney stones    Carpal tunnel syndrome of left wrist 05/22/2023    Carpal tunnel syndrome of right wrist 05/22/2023    Cataract, nuclear sclerotic, both eyes 05/22/2023    Cheilitis 09/02/2021    Chronic epigastric pain 09/12/2023    Chronic kidney disease     Chronic low back pain 05/22/2023    Colitis 09/12/2023    Contusion of right hand 05/22/2023    Contusion of right hand, initial encounter 10/13/2021    Contusion of right hand, initial encounter    Difficulty in walking, not elsewhere classified 03/19/2020    Difficulty walking    Difficulty walking 05/22/2023    Diverticulitis of intestine, part unspecified, without perforation or abscess without bleeding 11/15/2018    Acute diverticulitis    Diverticulosis of large intestine without perforation or abscess without bleeding 10/22/2021    Diverticulosis of colon    Dorsalgia, unspecified 11/15/2018    Acute back pain    Drusen (degenerative) of macula, bilateral 05/22/2023    Dry eye syndrome of left lacrimal gland 06/08/2020    Dry eye syndrome of left lacrimal gland    Dry eyes     Encounter for follow-up examination after completed treatment for conditions other than malignant neoplasm 03/04/2016    Hospital discharge follow-up    Epigastric pain 03/03/2016    Dyspepsia    Esophageal reflux 05/22/2023    Estrogen deficiency 05/22/2023    Eyelid abnormality 05/22/2023    Gait difficulty 05/22/2023    Gastritis 09/12/2023    Gastro-esophageal reflux disease with esophagitis, without bleeding  10/30/2020    Reflux esophagitis    Generalized abdominal pain 11/19/2018    Acute generalized abdominal pain    Hemangioma of skin and subcutaneous tissue 09/02/2021    Hiatal hernia 05/22/2023    Hip arthritis 09/17/2013    History of total left knee replacement 05/22/2023    Hyperlipidemia     Hypermetropia, bilateral 07/08/2022    Hypermetropia of both eyes    Hyperopia of both eyes 05/22/2023    Hypertension     Hypomagnesemia 06/17/2021    Hypomagnesemia    IBS (irritable bowel syndrome)     Idiopathic neuropathy     Inflamed seborrheic keratosis 09/02/2021    Injury of finger of right hand 05/22/2023    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    Lentigines 09/02/2021    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    Lumbar radicular pain 07/10/2013    Lupus     Macular degeneration     bilaterally    Mass of joint of left wrist 05/22/2023    Melanocytic nevi of trunk 09/02/2021    Melanocytic nevi of unspecified lower limb, including hip 09/02/2021    Melanocytic nevi of unspecified upper limb, including shoulder 09/02/2021    Menopausal symptom 09/12/2023    Mild vitamin D deficiency 05/22/2023    Muscle spasm of back 05/01/2017    Back muscle spasm    Myalgia due to statin 05/22/2023    Neoplasm of uncertain behavior of skin 09/02/2021    Neurogenic 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 05/22/2023    Ocular rosacea 05/22/2023    Ocular rosacea 05/22/2023    Osteoarthritis, hand 05/22/2023    Osteoarthritis, knee 02/13/2015    Osteopenia 09/12/2023    Osteoporosis     Other long term (current) drug therapy 07/11/2022    Encounter for long-term current use of high risk medication    Other nail disorders 10/09/2018    Leukonychia striata    Other reduced mobility 03/19/2020    Impaired mobility and ADLs    Other specified abnormal findings of blood chemistry 10/09/2018    Elevated serum creatinine    Other specified joint disorders, left wrist 02/08/2021    Mass of joint of left wrist    Other  specified postprocedural states 09/19/2017    History of colonoscopy    Other symptoms and signs involving the musculoskeletal system 07/16/2021    Wrist weakness    Other symptoms and signs involving the musculoskeletal system 10/28/2019    Weakness of both lower extremities    Overweight 10/26/2021    Overweight with body mass index (BMI) of 26 to 26.9 in adult    Pain due to internal orthopedic prosthetic devices, implants and grafts, initial encounter 01/14/2020    Pain due to total knee replacement, initial encounter    Pain in both hands 05/22/2023    Pain in both wrists 05/22/2023    Pain in left hip 06/25/2021    Pain of left hip joint    Pain in left knee 04/16/2020    Left knee pain    Pain in right hand 07/21/2021    Pain in both hands    Pelvis fracture (Multi) 5/23/2002    Personal history of diseases of the skin and subcutaneous tissue 06/26/2017    History of urticaria    Personal history of diseases of the skin and subcutaneous tissue 09/06/2018    History of contact dermatitis    Personal history of other diseases of the musculoskeletal system and connective tissue 02/06/2020    History of muscle pain    Personal history of other diseases of the nervous system and sense organs 01/05/2017    History of sleep disturbance    Personal history of other diseases of the respiratory system 05/11/2018    History of acute sinusitis    Personal history of other endocrine, nutritional and metabolic disease 04/27/2020    History of estrogen deficiency    Personal history of other specified conditions 03/03/2016    History of epigastric pain    Personal history of other specified conditions 10/25/2022    History of weakness    Personal history of other specified conditions 07/05/2022    History of epigastric pain    Personal history of other specified conditions 10/28/2019    History of gait disorder    Personal history of transient ischemic attack (TIA), and cerebral infarction without residual deficits  12/28/2015    History of stroke    Pleurodynia 12/15/2019    Rib pain on left side    Presence of unspecified artificial hip joint 05/15/2020    Status post revision of total hip replacement    Rash of face 05/22/2023    Renal mass, right 05/22/2023    Rib pain on left side 05/22/2023    Rib pain on right side 05/22/2023    Xray chest    Right upper quadrant pain 12/18/2017    Abdominal pain, RUQ    Rosacea 05/22/2023    Scar adherent 05/22/2023    Scar condition and fibrosis of skin 09/02/2021    Scar conditions and fibrosis of skin 07/02/2021    Scar adherent    Seborrheic dermatitis, unspecified 09/02/2021    Spinal stenosis of lumbar region 07/10/2013    Stroke (Multi)     Trigger finger, acquired 05/22/2023    Trochanteric bursitis, left hip 05/15/2020    Greater trochanteric bursitis of left hip    Ulcerative blepharitis 05/22/2023    Vitamin D deficiency, unspecified 06/09/2016    Hypovitaminosis D    Weakness of both lower extremities 05/22/2023    Wrist stiffness 05/22/2023    Wrist weakness 05/22/2023   [2]   Past Surgical History:  Procedure Laterality Date    CARPAL TUNNEL RELEASE Bilateral     EYE SURGERY      HIP SURGERY  12/28/2015    Hip Surgery    HYSTERECTOMY  12/28/2015    Hysterectomy    JOINT REPLACEMENT Left     TKA    KIDNEY STONE SURGERY      MR HEAD ANGIO WO IV CONTRAST  12/29/2012    MR HEAD ANGIO WO IV CONTRAST [facility]    MR NECK ANGIO WO IV CONTRAST  12/29/2012    MR NECK ANGIO WO IV CONTRAST [facility]    TOTAL HIP ARTHROPLASTY  12/28/2015    Hip Replacement    TRIGGER FINGER RELEASE     [3]   Family History  Problem Relation Name Age of Onset    Diabetes Mother Shawanda     Arthritis Mother Shawanda     Stroke Mother Shawanda     Other (pacemaker) Father      Breast cancer Other grandmother     Breast cancer Sibling      Cataracts Sister Shawanda Gilman     Macular degeneration Sister Shawanda Gilman     Cataracts Sister Shawanda Gilman     Macular degeneration Sister Shawanda Gilman

## 2025-06-13 ENCOUNTER — APPOINTMENT (OUTPATIENT)
Dept: OPHTHALMOLOGY | Facility: CLINIC | Age: 80
End: 2025-06-13
Payer: MEDICARE

## 2025-06-13 ENCOUNTER — OFFICE VISIT (OUTPATIENT)
Dept: OPHTHALMOLOGY | Facility: CLINIC | Age: 80
End: 2025-06-13
Payer: MEDICARE

## 2025-06-13 DIAGNOSIS — Z96.1 PSEUDOPHAKIA OF RIGHT EYE: Primary | ICD-10-CM

## 2025-06-13 PROCEDURE — 99024 POSTOP FOLLOW-UP VISIT: CPT | Performed by: OPHTHALMOLOGY

## 2025-06-13 ASSESSMENT — EXTERNAL EXAM - LEFT EYE: OS_EXAM: NORMAL

## 2025-06-13 ASSESSMENT — SLIT LAMP EXAM - LIDS: COMMENTS: GOOD POSITION, MGD

## 2025-06-13 ASSESSMENT — TONOMETRY
IOP_METHOD: TONOPEN
OD_IOP_MMHG: 23

## 2025-06-13 ASSESSMENT — VISUAL ACUITY
OD_SC: 20/60+1
METHOD: SNELLEN - LINEAR
OD_PH_SC: 20/40-1

## 2025-06-13 ASSESSMENT — EXTERNAL EXAM - RIGHT EYE: OD_EXAM: NORMAL

## 2025-06-13 NOTE — PROGRESS NOTES
Assessment/Plan   Diagnoses and all orders for this visit:  Pseudophakia of right eye    POD#1, doing well with mild edema.    Plan:  Pred QID  Moxi QID  Return precuations  See in 1 week.

## 2025-06-13 NOTE — PROGRESS NOTES
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     Keep Erythromycin BID  Warm compresses  Will switch surgery to OD then OS later after the stye heals

## 2025-06-18 LAB
CRP SERPL-MCNC: <3 MG/L
DSDNA AB SER-ACNC: 1 IU/ML
ERYTHROCYTE [SEDIMENTATION RATE] IN BLOOD BY WESTERGREN METHOD: 6 MM/H

## 2025-06-20 ENCOUNTER — EVALUATION (OUTPATIENT)
Dept: PHYSICAL THERAPY | Facility: CLINIC | Age: 80
End: 2025-06-20
Payer: MEDICARE

## 2025-06-20 DIAGNOSIS — M62.89 PELVIC FLOOR DYSFUNCTION: ICD-10-CM

## 2025-06-20 DIAGNOSIS — N32.81 OAB (OVERACTIVE BLADDER): Primary | ICD-10-CM

## 2025-06-20 PROCEDURE — 97161 PT EVAL LOW COMPLEX 20 MIN: CPT | Mod: GP

## 2025-06-20 PROCEDURE — 97530 THERAPEUTIC ACTIVITIES: CPT | Mod: GP

## 2025-06-20 ASSESSMENT — ENCOUNTER SYMPTOMS
DEPRESSION: 0
LOSS OF SENSATION IN FEET: 0
OCCASIONAL FEELINGS OF UNSTEADINESS: 0

## 2025-06-20 ASSESSMENT — PAIN SCALES - GENERAL: PAINLEVEL_OUTOF10: 2

## 2025-06-20 ASSESSMENT — PAIN - FUNCTIONAL ASSESSMENT: PAIN_FUNCTIONAL_ASSESSMENT: 0-10

## 2025-06-20 NOTE — PROGRESS NOTES
Physical Therapy    PELVIC FLOOR EVALUATION AND TREATMENT    Name: Vicky Govea  MRN: [id number]  : 1945  Today's Date: 25     Time Calculation  Start Time: 1000  Stop Time: 1045  Time Calculation (min): 45 min  PT Evaluation (Low) 45404: 35 minutes  Therapeutic Activity 29521: 10 minutes    Assessment: Pt is an 79 yo female who presents to PT with chief complaints of overactive bladder and urinary leakage. Pt presents with impaired pelvic floor coordination, tightness in pelvic floor musculature, tenderness superior to umbilicus, and generalized weakness in core/BLE musculature. Pt may benefit from PT services at this time for eduction and retraining of bladder, manual therapy/stretching, and strengthening to reduce symptoms and improve quality of life.   PT Assessment Results: Decreased strength, Decreased mobility, Decreased coordination, Pain  Rehab Prognosis: Good  Evaluation/Treatment Tolerance: Patient tolerated treatment well  Medical Staff Made Aware: Yes  Strengths: Rehab experience, Ability to acquire knowledge, Attitude of self  End of Session Communication: Bedside nurse     Plan:   Treatment/Interventions: Biofeedback, Education/ Instruction, Manual therapy, Neuromuscular re-education, Therapeutic activities, Therapeutic exercises  PT Plan: Skilled PT  PT Frequency: 1 time per week  Onset Date: 25  Rehab Potential: Good  Plan of Care Agreement: Patient    Interventions:   Therapeutic Activity:  -Education and dispersement of Bladder Diary with directions    Current Problem:  1. OAB (overactive bladder)  Referral to Physical Therapy    Follow Up In Physical Therapy      2. Pelvic floor dysfunction            Subjective   General  Reason for Referral: Diagnosis  N32.81 (ICD-10-CM) - OAB (overactive bladder)  Referred By: Dr. Shahriar Ag  Past Medical History Relevant to Rehab: High BP (managed with medicaiton), stroke (r carotid artery blocked), lupus, R TKA 25  General Comment:  "Visit #1: NO AUTH, 30.00 COPAY, 100% [place]OVER[place], [place], 3300 OOP-NOT MET, MMO MCR  Precautions  STEADI Fall Risk Score (The score of 4 or more indicates an increased risk of falling): 4 (fall risk)  Precautions Comment: None     Pain  Pain Assessment: 0-10  0-10 (Numeric) Pain Score: 2  Pain Location: Hip  Pain Orientation: Left    Objective   PELVIC HISTORY:  Chief Complaint/Description of Symptoms:   Pt has had symptoms for 3 years, was told she has overactive bladder. The leakage started a couple months ago, it is a small amount and it is not all the time. During the night pt voids every couple of hours. After supper it seems she voids every hour. Occasionally does \"just in case voiding\".     HPI: Last Thursday cataract surgery, cleared from all restrictions    Home Environment/Social Factors/Occupation: Retired ( at a [employer] store)    Pt stated goal: more control of the urinary urgency    PELVIC PAIN:     Description: muscles spasms / vaginal after going to the restroom, not every time, lasts a couple minutes    Pain when emptying bladder: no  Pain with wiping or tight clothing: no  Pain with intercourse: yes  History of back pain: yes. Stenosis    EXERCISE:  Do you do Kegels? no  Current exercise regime: not in the last week and a half. Pt just had cataract surgery    BLADDER:     Excessive Urinary Urgency: yes  Daytime Voiding Frequency: 6  Nighttime Voiding Frequency: at least 2-3x. The pt states the knee pain wakes her up and then she goes to the bathroom  Unintentional urine loss frequency: Was everyday, but slowly decreasing frequency  Leakage occurs with: A lot of times it feels like it is leaking but the pad/underwear is not damp   Leakage amount: small  Difficulty initiating flow of urine: no  Able to completely empty bladder: sometimes no  Tests performed by doctor: Internal vaginal examination, painful  Frequent UTI's: no    BOWEL:     Frequent constipation/straining/incomplete emptying: " "occasional round balls, takes fiber pill with improves stool           INTERNAL VAGINAL EXAMINATION:  PFM Strength: 4/5  Coordination: Inhalation observed, educated pt on exhale on kegel   Endurance: 5 second holds  Minimal tightness throughout all 3 layers of pelvic floor musculature      EXTERNAL PALPATION:  Abdominals: tenderness above umbilicus      POSTURE: left lateral shift of torso      MMT R/L:  Hip flex: 4+/5/ 4/5  Hip abd:  3+/5/ 3+/5  Hip add: 3+/5  3+/5  Hip ext: 3+/5/4-/5  TA: Decreased core stability with MMT    ROM R/L:  Lumbar flex: WFL, L hip pain  Lumbar ext: min limitation, P!  Lumbar side bend: min limitation/WFL  Lumbar rotation: WFL/mod limitation w L hip pain      FLEXIBILITY R/L:  Hamstrings: fair/fair+        OUTCOMES MEASURE:  PFIQ-7: 19    Education:  Outpatient Education  Individual(s) Educated: Patient  Education Provided: Anatomy, Home Exercise Program, POC, Physiology, Posture  Risk and Benefits Discussed with Patient/Caregiver/Other: yes  Patient/Caregiver Demonstrated Understanding: yes  Plan of Care Discussed and Agreed Upon: yes  Patient Response to Education: Patient/Caregiver Asked Appropriate Questions, Patient/Caregiver Performed Return Demonstration of Exercises/Activities, Patient/Caregiver Verbalized Understanding of Information    Careplan Goals:  Problem: Pelvic Floor       Goal: Patient will report improved sleep with less waking to void, voiding at most 1x per night          Goal: Reduce frequency or urine loss at least 50% to improve quality of life          Goal: Decrease sense of urgency/frequency of elimination by spreading all voids at least 2 hours apart          Goal: Pt will improve \"PFIQ-7\" score to 10 for increased independence and ease with ADL/IADL's, skills, and work/leisure activities.           Goal: Pt will demonstrate independence and compliance with the administered HEP exercises for ROM, strength, balance, pain, edema, scar formation.      "       Daniela Fernandes, PT

## 2025-06-24 ENCOUNTER — APPOINTMENT (OUTPATIENT)
Dept: OPHTHALMOLOGY | Facility: CLINIC | Age: 80
End: 2025-06-24
Payer: MEDICARE

## 2025-06-24 ENCOUNTER — APPOINTMENT (OUTPATIENT)
Dept: PHYSICAL THERAPY | Facility: CLINIC | Age: 80
End: 2025-06-24
Payer: MEDICARE

## 2025-06-24 ENCOUNTER — TELEPHONE (OUTPATIENT)
Dept: PRIMARY CARE | Facility: CLINIC | Age: 80
End: 2025-06-24

## 2025-06-24 DIAGNOSIS — Z96.1 PSEUDOPHAKIA OF RIGHT EYE: Primary | ICD-10-CM

## 2025-06-24 DIAGNOSIS — I10 BENIGN ESSENTIAL HYPERTENSION: ICD-10-CM

## 2025-06-24 PROCEDURE — 99024 POSTOP FOLLOW-UP VISIT: CPT | Performed by: OPHTHALMOLOGY

## 2025-06-24 RX ORDER — CLOPIDOGREL BISULFATE 75 MG/1
75 TABLET ORAL DAILY
Qty: 90 TABLET | Refills: 1 | Status: SHIPPED | OUTPATIENT
Start: 2025-06-24

## 2025-06-24 ASSESSMENT — EXTERNAL EXAM - LEFT EYE: OS_EXAM: NORMAL

## 2025-06-24 ASSESSMENT — TONOMETRY
OD_IOP_MMHG: 14
IOP_METHOD: TONOPEN
OS_IOP_MMHG: 15

## 2025-06-24 ASSESSMENT — VISUAL ACUITY
METHOD: SNELLEN - LINEAR
OS_CC: 20/40+2
OD_SC: 20/20-1

## 2025-06-24 ASSESSMENT — SLIT LAMP EXAM - LIDS: COMMENTS: GOOD POSITION, MGD

## 2025-06-24 ASSESSMENT — EXTERNAL EXAM - RIGHT EYE: OD_EXAM: NORMAL

## 2025-06-24 NOTE — PROGRESS NOTES
Assessment/Plan   Diagnoses and all orders for this visit:  Pseudophakia of right eye    POW#1, doing well.    Plan:  Pred BID for 1 week then one daily for 1 week then stop  Stop Moxi QID  Return precuations  See for CEIOL OS.

## 2025-06-30 ENCOUNTER — TREATMENT (OUTPATIENT)
Dept: PHYSICAL THERAPY | Facility: CLINIC | Age: 80
End: 2025-06-30
Payer: MEDICARE

## 2025-06-30 DIAGNOSIS — N32.81 OAB (OVERACTIVE BLADDER): ICD-10-CM

## 2025-06-30 DIAGNOSIS — M62.89 PELVIC FLOOR DYSFUNCTION: Primary | ICD-10-CM

## 2025-06-30 PROCEDURE — 97110 THERAPEUTIC EXERCISES: CPT | Mod: GP

## 2025-06-30 PROCEDURE — 97530 THERAPEUTIC ACTIVITIES: CPT | Mod: GP

## 2025-06-30 ASSESSMENT — PAIN - FUNCTIONAL ASSESSMENT: PAIN_FUNCTIONAL_ASSESSMENT: 0-10

## 2025-06-30 ASSESSMENT — PAIN SCALES - GENERAL: PAINLEVEL_OUTOF10: 0 - NO PAIN

## 2025-07-01 DIAGNOSIS — I63.9 CEREBRAL INFARCTION, UNSPECIFIED MECHANISM (MULTI): ICD-10-CM

## 2025-07-01 RX ORDER — ATORVASTATIN CALCIUM 10 MG/1
10 TABLET, FILM COATED ORAL NIGHTLY
Qty: 90 TABLET | Refills: 3 | Status: SHIPPED | OUTPATIENT
Start: 2025-07-01

## 2025-07-07 DIAGNOSIS — I10 BENIGN ESSENTIAL HYPERTENSION: ICD-10-CM

## 2025-07-07 RX ORDER — LISINOPRIL 20 MG/1
20 TABLET ORAL 2 TIMES DAILY
Qty: 180 TABLET | Refills: 1 | Status: SHIPPED | OUTPATIENT
Start: 2025-07-07 | End: 2025-07-11 | Stop reason: SDUPTHER

## 2025-07-07 RX ORDER — AMLODIPINE BESYLATE 10 MG/1
10 TABLET ORAL DAILY
Qty: 90 TABLET | Refills: 1 | Status: SHIPPED | OUTPATIENT
Start: 2025-07-07 | End: 2025-07-11 | Stop reason: SDUPTHER

## 2025-07-08 ENCOUNTER — PATIENT MESSAGE (OUTPATIENT)
Dept: RHEUMATOLOGY | Facility: CLINIC | Age: 80
End: 2025-07-08
Payer: MEDICARE

## 2025-07-09 ENCOUNTER — OFFICE VISIT (OUTPATIENT)
Dept: OPHTHALMOLOGY | Facility: CLINIC | Age: 80
End: 2025-07-09
Payer: MEDICARE

## 2025-07-09 DIAGNOSIS — H02.88A MEIBOMIAN GLAND DISEASE OF UPPER AND LOWER EYELIDS OF BOTH EYES: ICD-10-CM

## 2025-07-09 DIAGNOSIS — Z96.1 PSEUDOPHAKIA OF RIGHT EYE: Primary | ICD-10-CM

## 2025-07-09 DIAGNOSIS — H02.88B MEIBOMIAN GLAND DISEASE OF UPPER AND LOWER EYELIDS OF BOTH EYES: ICD-10-CM

## 2025-07-09 PROCEDURE — 99024 POSTOP FOLLOW-UP VISIT: CPT | Performed by: OPHTHALMOLOGY

## 2025-07-09 ASSESSMENT — VISUAL ACUITY
METHOD: SNELLEN - LINEAR
OS_CC+: -1
CORRECTION_TYPE: GLASSES
OS_CC: 20/40
OD_SC: 20/20
OD_SC+: -2

## 2025-07-09 ASSESSMENT — EXTERNAL EXAM - RIGHT EYE: OD_EXAM: NORMAL

## 2025-07-09 ASSESSMENT — ENCOUNTER SYMPTOMS
ENDOCRINE NEGATIVE: 0
RESPIRATORY NEGATIVE: 0
MUSCULOSKELETAL NEGATIVE: 0
ALLERGIC/IMMUNOLOGIC NEGATIVE: 0
CARDIOVASCULAR NEGATIVE: 0
EYES NEGATIVE: 1
NEUROLOGICAL NEGATIVE: 0
HEMATOLOGIC/LYMPHATIC NEGATIVE: 0
CONSTITUTIONAL NEGATIVE: 0
PSYCHIATRIC NEGATIVE: 0
GASTROINTESTINAL NEGATIVE: 0

## 2025-07-09 ASSESSMENT — SLIT LAMP EXAM - LIDS: COMMENTS: GOOD POSITION, MGD

## 2025-07-09 ASSESSMENT — EXTERNAL EXAM - LEFT EYE: OS_EXAM: NORMAL

## 2025-07-09 NOTE — PROGRESS NOTES
Assessment/Plan   Diagnoses and all orders for this visit:  Pseudophakia of right eye    POW#2, doing well. Here today because of foreign body (FB) sensation in the left eye. No foreign body (FB) found no signs of infection.    Plan:  Keep hot compresses for meibomian gland dysfunction (MGD)   See for CEIOL OS as scheduled tomorrow.

## 2025-07-10 ENCOUNTER — ANESTHESIA EVENT (OUTPATIENT)
Dept: OPERATING ROOM | Facility: CLINIC | Age: 80
End: 2025-07-10
Payer: MEDICARE

## 2025-07-10 ENCOUNTER — HOSPITAL ENCOUNTER (OUTPATIENT)
Facility: CLINIC | Age: 80
Setting detail: OUTPATIENT SURGERY
Discharge: HOME | End: 2025-07-10
Attending: OPHTHALMOLOGY | Admitting: OPHTHALMOLOGY
Payer: MEDICARE

## 2025-07-10 ENCOUNTER — ANESTHESIA (OUTPATIENT)
Dept: OPERATING ROOM | Facility: CLINIC | Age: 80
End: 2025-07-10
Payer: MEDICARE

## 2025-07-10 VITALS
BODY MASS INDEX: 24.41 KG/M2 | TEMPERATURE: 97 F | DIASTOLIC BLOOD PRESSURE: 60 MMHG | HEIGHT: 60 IN | OXYGEN SATURATION: 99 % | SYSTOLIC BLOOD PRESSURE: 115 MMHG | WEIGHT: 124.34 LBS | HEART RATE: 79 BPM | RESPIRATION RATE: 16 BRPM

## 2025-07-10 DIAGNOSIS — H25.813 COMBINED FORM OF AGE-RELATED CATARACT, BOTH EYES: Primary | ICD-10-CM

## 2025-07-10 PROCEDURE — 2500000004 HC RX 250 GENERAL PHARMACY W/ HCPCS (ALT 636 FOR OP/ED)

## 2025-07-10 PROCEDURE — 2500000004 HC RX 250 GENERAL PHARMACY W/ HCPCS (ALT 636 FOR OP/ED): Performed by: OPHTHALMOLOGY

## 2025-07-10 PROCEDURE — 2500000005 HC RX 250 GENERAL PHARMACY W/O HCPCS

## 2025-07-10 PROCEDURE — 2760000001 HC OR 276 NO HCPCS: Performed by: OPHTHALMOLOGY

## 2025-07-10 PROCEDURE — 96372 THER/PROPH/DIAG INJ SC/IM: CPT | Mod: 59 | Performed by: OPHTHALMOLOGY

## 2025-07-10 PROCEDURE — 2500000005 HC RX 250 GENERAL PHARMACY W/O HCPCS: Performed by: OPHTHALMOLOGY

## 2025-07-10 PROCEDURE — 3700000001 HC GENERAL ANESTHESIA TIME - INITIAL BASE CHARGE: Performed by: OPHTHALMOLOGY

## 2025-07-10 PROCEDURE — 7100000009 HC PHASE TWO TIME - INITIAL BASE CHARGE: Performed by: OPHTHALMOLOGY

## 2025-07-10 PROCEDURE — 3600000003 HC OR TIME - INITIAL BASE CHARGE - PROCEDURE LEVEL THREE: Performed by: OPHTHALMOLOGY

## 2025-07-10 PROCEDURE — V2632 POST CHMBR INTRAOCULAR LENS: HCPCS | Performed by: OPHTHALMOLOGY

## 2025-07-10 PROCEDURE — 3700000002 HC GENERAL ANESTHESIA TIME - EACH INCREMENTAL 1 MINUTE: Performed by: OPHTHALMOLOGY

## 2025-07-10 PROCEDURE — 3600000008 HC OR TIME - EACH INCREMENTAL 1 MINUTE - PROCEDURE LEVEL THREE: Performed by: OPHTHALMOLOGY

## 2025-07-10 PROCEDURE — A66982 PR REMV CATARACT EXTRACAP,INSERT LENS,COMP

## 2025-07-10 PROCEDURE — 99100 ANES PT EXTEME AGE<1 YR&>70: CPT | Performed by: ANESTHESIOLOGY

## 2025-07-10 PROCEDURE — 7100000010 HC PHASE TWO TIME - EACH INCREMENTAL 1 MINUTE: Performed by: OPHTHALMOLOGY

## 2025-07-10 PROCEDURE — 66984 XCAPSL CTRC RMVL W/O ECP: CPT | Performed by: OPHTHALMOLOGY

## 2025-07-10 PROCEDURE — A66982 PR REMV CATARACT EXTRACAP,INSERT LENS,COMP: Performed by: ANESTHESIOLOGY

## 2025-07-10 DEVICE — CLAREON ASPHERIC HYDROPHOBIC ACRYLIC IOL WITH THE AUTONOMEAUTOMATED PRE-LOADED DELIVERY SYSTEM
Type: IMPLANTABLE DEVICE | Site: EYE | Status: FUNCTIONAL
Brand: CLAREON™

## 2025-07-10 RX ORDER — PHENYLEPHRINE HYDROCHLORIDE 25 MG/ML
1 SOLUTION/ DROPS OPHTHALMIC
Status: COMPLETED | OUTPATIENT
Start: 2025-07-10 | End: 2025-07-10

## 2025-07-10 RX ORDER — NORETHINDRONE AND ETHINYL ESTRADIOL 0.5-0.035
KIT ORAL AS NEEDED
Status: DISCONTINUED | OUTPATIENT
Start: 2025-07-10 | End: 2025-07-10

## 2025-07-10 RX ORDER — EPINEPHRINE 1 MG/ML
INJECTION, SOLUTION, CONCENTRATE INTRAVENOUS AS NEEDED
Status: DISCONTINUED | OUTPATIENT
Start: 2025-07-10 | End: 2025-07-10 | Stop reason: HOSPADM

## 2025-07-10 RX ORDER — CYCLOPENTOLATE HYDROCHLORIDE 10 MG/ML
1 SOLUTION/ DROPS OPHTHALMIC
Status: COMPLETED | OUTPATIENT
Start: 2025-07-10 | End: 2025-07-10

## 2025-07-10 RX ORDER — MOXIFLOXACIN 5 MG/ML
1 SOLUTION/ DROPS OPHTHALMIC 4 TIMES DAILY
Qty: 3 ML | Refills: 0 | Status: SHIPPED | OUTPATIENT
Start: 2025-07-10

## 2025-07-10 RX ORDER — FENTANYL CITRATE 50 UG/ML
INJECTION, SOLUTION INTRAMUSCULAR; INTRAVENOUS AS NEEDED
Status: DISCONTINUED | OUTPATIENT
Start: 2025-07-10 | End: 2025-07-10

## 2025-07-10 RX ORDER — LIDOCAINE HYDROCHLORIDE 20 MG/ML
INJECTION, SOLUTION INFILTRATION; PERINEURAL AS NEEDED
Status: DISCONTINUED | OUTPATIENT
Start: 2025-07-10 | End: 2025-07-10 | Stop reason: HOSPADM

## 2025-07-10 RX ORDER — TROPICAMIDE 10 MG/ML
1 SOLUTION/ DROPS OPHTHALMIC
Status: COMPLETED | OUTPATIENT
Start: 2025-07-10 | End: 2025-07-10

## 2025-07-10 RX ORDER — TETRACAINE HYDROCHLORIDE 5 MG/ML
1 SOLUTION OPHTHALMIC ONCE
Status: COMPLETED | OUTPATIENT
Start: 2025-07-10 | End: 2025-07-10

## 2025-07-10 RX ORDER — PREDNISOLONE ACETATE 10 MG/ML
1 SUSPENSION/ DROPS OPHTHALMIC 4 TIMES DAILY
Qty: 5 ML | Refills: 1 | Status: SHIPPED | OUTPATIENT
Start: 2025-07-10

## 2025-07-10 RX ORDER — MIDAZOLAM HYDROCHLORIDE 1 MG/ML
INJECTION, SOLUTION INTRAMUSCULAR; INTRAVENOUS AS NEEDED
Status: DISCONTINUED | OUTPATIENT
Start: 2025-07-10 | End: 2025-07-10

## 2025-07-10 RX ORDER — NEOMYCIN SULFATE, POLYMYXIN B SULFATE AND DEXAMETHASONE 3.5; 10000; 1 MG/ML; [USP'U]/ML; MG/ML
SUSPENSION/ DROPS OPHTHALMIC AS NEEDED
Status: DISCONTINUED | OUTPATIENT
Start: 2025-07-10 | End: 2025-07-10 | Stop reason: HOSPADM

## 2025-07-10 RX ADMIN — CYCLOPENTOLATE HYDROCHLORIDE 1 DROP: 10 SOLUTION/ DROPS OPHTHALMIC at 10:20

## 2025-07-10 RX ADMIN — FENTANYL CITRATE 100 MCG: 50 INJECTION, SOLUTION INTRAMUSCULAR; INTRAVENOUS at 11:03

## 2025-07-10 RX ADMIN — TROPICAMIDE 1 DROP: 10 SOLUTION/ DROPS OPHTHALMIC at 10:10

## 2025-07-10 RX ADMIN — TETRACAINE HYDROCHLORIDE 1 DROP: 5 SOLUTION OPHTHALMIC at 10:00

## 2025-07-10 RX ADMIN — MIDAZOLAM HYDROCHLORIDE 2 MG: 1 INJECTION, SOLUTION INTRAMUSCULAR; INTRAVENOUS at 11:01

## 2025-07-10 RX ADMIN — CYCLOPENTOLATE HYDROCHLORIDE 1 DROP: 10 SOLUTION/ DROPS OPHTHALMIC at 10:10

## 2025-07-10 RX ADMIN — PHENYLEPHRINE HYDROCHLORIDE 1 DROP: 25 SOLUTION/ DROPS OPHTHALMIC at 10:00

## 2025-07-10 RX ADMIN — TROPICAMIDE 1 DROP: 10 SOLUTION/ DROPS OPHTHALMIC at 10:20

## 2025-07-10 RX ADMIN — EPHEDRINE SULFATE 10 MG: 50 INJECTION INTRAVENOUS at 11:20

## 2025-07-10 RX ADMIN — TROPICAMIDE 1 DROP: 10 SOLUTION/ DROPS OPHTHALMIC at 10:00

## 2025-07-10 RX ADMIN — PHENYLEPHRINE HYDROCHLORIDE 1 DROP: 25 SOLUTION/ DROPS OPHTHALMIC at 10:20

## 2025-07-10 RX ADMIN — CYCLOPENTOLATE HYDROCHLORIDE 1 DROP: 10 SOLUTION/ DROPS OPHTHALMIC at 10:00

## 2025-07-10 RX ADMIN — PHENYLEPHRINE HYDROCHLORIDE 1 DROP: 25 SOLUTION/ DROPS OPHTHALMIC at 10:10

## 2025-07-10 ASSESSMENT — PAIN - FUNCTIONAL ASSESSMENT
PAIN_FUNCTIONAL_ASSESSMENT: 0-10

## 2025-07-10 ASSESSMENT — ENCOUNTER SYMPTOMS
GASTROINTESTINAL NEGATIVE: 1
RESPIRATORY NEGATIVE: 1
CARDIOVASCULAR NEGATIVE: 1
PSYCHIATRIC NEGATIVE: 1

## 2025-07-10 ASSESSMENT — PAIN SCALES - GENERAL
PAINLEVEL_OUTOF10: 0 - NO PAIN
PAINLEVEL_OUTOF10: 0 - NO PAIN
PAIN_LEVEL: 0
PAINLEVEL_OUTOF10: 0 - NO PAIN

## 2025-07-10 NOTE — OP NOTE
Cataract extraction with intraocular lens implantation (L) Operative Note     Date: 7/10/2025  OR Location: [facility] OR    Name: Vicky Govea, : 1945, Age: 80 y.o., MRN: [id number], Sex: female    Diagnosis  Pre-op Diagnosis      * Combined form of age-related cataract, both eyes [H25.813] Post-op Diagnosis     * Combined form of age-related cataract, both eyes [H25.813]     Procedures  Cataract extraction with intraocular lens implantation  14105 - DC XCAPSL CTRC RMVL INSJ IO LENS PROSTH W/O ECP      Surgeons      * Ronak Rodríguez - Primary    Resident/Fellow/Other Assistant:  Surgeons and Role:  * No surgeons found with a matching role *    Staff:   Circulator: Shawanda Schmitt Person: Malu    Anesthesia Staff: Anesthesiologist: Courtney Jonas MD  C-AA: IVETT Tuttle    Procedure Summary  Anesthesia: Monitor Anesthesia Care  ASA: III  Estimated Blood Loss: minimal mL  Intra-op Medications:   Administrations occurring from 1108 to 1153 on 07/10/25:   Medication Name Total Dose   EPINEPHrine HCl (PF) (Adrenalin) injection 0.5 mg   balanced salts (BSS) intraocular solution 500 mL   chondroitin sulf-sod hyaluron (Duovisc) intraocular kit 0.5 mL   lidocaine (Xylocaine) 20 mg/mL (2 %) injection 5 mL   neomycin-polymyxin-dexAMETHasone (Maxitrol) 3.5mg/mL-10,000 unit/mL-0.1 % ophthalmic suspension 2 drop   ePHEDrine 50 mg/mL 10 mg              Anesthesia Record               Intraprocedure I/O Totals       None           Specimen: No specimens collected              Drains and/or Catheters: * None in log *    Tourniquet Times:         Implants:  Implants       Type Name Action Serial No.       6.0MM X 13MM CLAREON 1-PIECE ASPHERIC MONOFOCAL INTRAOCULAR LENS, 20.0 DIOPTER, W/AUTONOME AUTOMATED PRE-LOADED DELIVERY SYSTEM, HYDROPHOBIC ACRYLIC Implanted [id number]              Findings: Left cataract    Indications: Vicky Govea is an 80 y.o. female who is having surgery for Combined form of  age-related cataract, both eyes [H25.813].     The patient was seen in the preoperative area. The risks, benefits, complications, treatment options, non-operative alternatives, expected recovery and outcomes were discussed with the patient. The possibilities of reaction to medication, pulmonary aspiration, injury to surrounding structures, bleeding, recurrent infection, the need for additional procedures, failure to diagnose a condition, and creating a complication requiring transfusion or operation were discussed with the patient. The patient concurred with the proposed plan, giving informed consent.  The site of surgery was properly noted/marked if necessary per policy. The patient has been actively warmed in preoperative area. Preoperative antibiotics are not indicated. Venous thrombosis prophylaxis are not indicated.    Procedure Details: The patient was placed in the supine position on the operating room table where appropriate blood pressure and cardiac monitoring were initiated. The patient was prepped and draped in the usual sterile fashion for intraocular surgery. This included instillation of Betadine 5% onto the ocular surface followed by irrigation with balance salt solution a minute or two later. A lid speculum was placed and the operating microscope was positioned. One paracentesis stab incision was made to the left of the planned cataract incision with a 15-degree supersharp blade. 1 ml of preservative free lidocaine was injected into the AC. Viscoat was used to replace the aqueous humor. A temporal clear corneal wound was fashioned beginning at the limbus with a 2.4 mm keratome, extending 2 mm into clear cornea before entering the anterior chamber. A continuous tear circular capsulorhexis of approximately 5 mm in diameter was performed. Hydrodissection was performed using a Celaya canula. The endothelium was coated with viscoat again. Using the Ozil handpiece on the [employer] Lens Removal System, the  nucleus was emulsified and aspirated using a divide-and-conquer technique. Residual cortex was removed from the eye with the irrigation/aspiration bimanually. ProVisc was used to inflate the capsular bag. The lens implant was inspected and found to be free of visible defects. The lens used was an Gadiel model CNA0T0, power 20.0 diopter intraocular lens. The lens was inserted into the capsular bag using the [place] insertion mechanism. The lens was centered with a Jackson hook. Residual viscoelastic was removed from the eye with the irrigation/aspiration instrument. The wounds were checked and found to be watertight. The lid speculum was removed and the eye was dressed with Maxitrol ointment, eye pad, tape, and shield. The patient tolerated the procedure well, and there were no complications.    Evidence of Infection: No   Complications:  None; patient tolerated the procedure well.    Disposition: PACU - hemodynamically stable.  Condition: stable                 Additional Details: none    Attending Attestation: I performed the procedure.    Ronak Rodríguez  Phone Number: [phone]

## 2025-07-10 NOTE — H&P
History Of Present Illness  Vicky Govea is a 80 y.o. female presenting with visually significant cataract of the left eye, here for cataract extraction and intraocular lens (IOL) implantation of the left eye.       Past Medical History  Past Medical History:   Diagnosis Date    Abnormal urine findings 05/22/2023    Acquired spondylolisthesis     Acute kidney injury 05/22/2023    Repeat cbc,cmp    Acute non-recurrent frontal sinusitis 08/16/2023    - augmentin twice a day 7 days  -  Nasal saline, increase fluids  -  hand hygiene and infection prevention discussed  -  Warm compress to sinuses  - humidification  - recommend to eat yogurt twice daily while taking antibiotic or a daily probiotic       Acute pharyngitis due to other specified organisms 05/03/2019    Acute bacterial pharyngitis    Age-related nuclear cataract of left eye 05/22/2023    Age-related nuclear cataract of right eye 05/22/2023    Allergic     Allergic contact dermatitis due to plants, except food 06/11/2018    Contact dermatitis due to poison oak    Allergic reaction to contrast dye 09/12/2023    Angina pectoris 08/16/2023    Arthritis     Arthritis of right knee 05/22/2023    Back pain 09/12/2023    Benign paroxysmal positional vertigo 07/08/2021    Body mass index (BMI) 26.0-26.9, adult 06/17/2021    Body mass index (BMI) of 26.0 to 26.9 in adult    Calculus of kidney 12/28/2015    Recurrent kidney stones    Carpal tunnel syndrome of left wrist 05/22/2023    Carpal tunnel syndrome of right wrist 05/22/2023    Cataract, nuclear sclerotic, both eyes 05/22/2023    Cheilitis 09/02/2021    Chronic epigastric pain 09/12/2023    Chronic kidney disease     Chronic low back pain 05/22/2023    Colitis 09/12/2023    Contusion of right hand 05/22/2023    Contusion of right hand, initial encounter 10/13/2021    Contusion of right hand, initial encounter    Difficulty in walking, not elsewhere classified 03/19/2020    Difficulty walking    Difficulty  walking 05/22/2023    Diverticulitis of intestine, part unspecified, without perforation or abscess without bleeding 11/15/2018    Acute diverticulitis    Diverticulosis of large intestine without perforation or abscess without bleeding 10/22/2021    Diverticulosis of colon    Dorsalgia, unspecified 11/15/2018    Acute back pain    Drusen (degenerative) of macula, bilateral 05/22/2023    Dry eye syndrome of left lacrimal gland 06/08/2020    Dry eye syndrome of left lacrimal gland    Dry eyes     Encounter for follow-up examination after completed treatment for conditions other than malignant neoplasm 03/04/2016    Hospital discharge follow-up    Epigastric pain 03/03/2016    Dyspepsia    Esophageal reflux 05/22/2023    Estrogen deficiency 05/22/2023    Eyelid abnormality 05/22/2023    Gait difficulty 05/22/2023    Gastritis 09/12/2023    Gastro-esophageal reflux disease with esophagitis, without bleeding 10/30/2020    Reflux esophagitis    Generalized abdominal pain 11/19/2018    Acute generalized abdominal pain    Hemangioma of skin and subcutaneous tissue 09/02/2021    Hiatal hernia 05/22/2023    Hip arthritis 09/17/2013    History of total left knee replacement 05/22/2023    Hyperlipidemia     Hypermetropia, bilateral 07/08/2022    Hypermetropia of both eyes    Hyperopia of both eyes 05/22/2023    Hypertension     Hypomagnesemia 06/17/2021    Hypomagnesemia    IBS (irritable bowel syndrome)     Idiopathic neuropathy     Inflamed seborrheic keratosis 09/02/2021    Injury of finger of right hand 05/22/2023    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    Lentigines 09/02/2021    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    Lumbar radicular pain 07/10/2013    Lupus     Macular degeneration     bilaterally    Mass of joint of left wrist 05/22/2023    Melanocytic nevi of trunk 09/02/2021    Melanocytic nevi of unspecified lower limb, including hip 09/02/2021    Melanocytic nevi of unspecified upper limb,  including shoulder 09/02/2021    Menopausal symptom 09/12/2023    Mild vitamin D deficiency 05/22/2023    Muscle spasm of back 05/01/2017    Back muscle spasm    Myalgia due to statin 05/22/2023    Neoplasm of uncertain behavior of skin 09/02/2021    Neurogenic 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 05/22/2023    Ocular rosacea 05/22/2023    Ocular rosacea 05/22/2023    Osteoarthritis, hand 05/22/2023    Osteoarthritis, knee 02/13/2015    Osteopenia 09/12/2023    Osteoporosis     Other long term (current) drug therapy 07/11/2022    Encounter for long-term current use of high risk medication    Other nail disorders 10/09/2018    Leukonychia striata    Other reduced mobility 03/19/2020    Impaired mobility and ADLs    Other specified abnormal findings of blood chemistry 10/09/2018    Elevated serum creatinine    Other specified joint disorders, left wrist 02/08/2021    Mass of joint of left wrist    Other specified postprocedural states 09/19/2017    History of colonoscopy    Other symptoms and signs involving the musculoskeletal system 07/16/2021    Wrist weakness    Other symptoms and signs involving the musculoskeletal system 10/28/2019    Weakness of both lower extremities    Overweight 10/26/2021    Overweight with body mass index (BMI) of 26 to 26.9 in adult    Pain due to internal orthopedic prosthetic devices, implants and grafts, initial encounter 01/14/2020    Pain due to total knee replacement, initial encounter    Pain in both hands 05/22/2023    Pain in both wrists 05/22/2023    Pain in left hip 06/25/2021    Pain of left hip joint    Pain in left knee 04/16/2020    Left knee pain    Pain in right hand 07/21/2021    Pain in both hands    Pelvis fracture (Multi) 5/23/2002    Personal history of diseases of the skin and subcutaneous tissue 06/26/2017    History of urticaria    Personal history of diseases of the skin and subcutaneous tissue 09/06/2018    History of contact dermatitis    Personal history  of other diseases of the musculoskeletal system and connective tissue 02/06/2020    History of muscle pain    Personal history of other diseases of the nervous system and sense organs 01/05/2017    History of sleep disturbance    Personal history of other diseases of the respiratory system 05/11/2018    History of acute sinusitis    Personal history of other endocrine, nutritional and metabolic disease 04/27/2020    History of estrogen deficiency    Personal history of other specified conditions 03/03/2016    History of epigastric pain    Personal history of other specified conditions 10/25/2022    History of weakness    Personal history of other specified conditions 07/05/2022    History of epigastric pain    Personal history of other specified conditions 10/28/2019    History of gait disorder    Personal history of transient ischemic attack (TIA), and cerebral infarction without residual deficits 12/28/2015    History of stroke    Pleurodynia 12/15/2019    Rib pain on left side    Presence of unspecified artificial hip joint 05/15/2020    Status post revision of total hip replacement    Rash of face 05/22/2023    Renal mass, right 05/22/2023    Rib pain on left side 05/22/2023    Rib pain on right side 05/22/2023    Xray chest    Right upper quadrant pain 12/18/2017    Abdominal pain, RUQ    Rosacea 05/22/2023    Scar adherent 05/22/2023    Scar condition and fibrosis of skin 09/02/2021    Scar conditions and fibrosis of skin 07/02/2021    Scar adherent    Seborrheic dermatitis, unspecified 09/02/2021    Spinal stenosis of lumbar region 07/10/2013    Stroke (Multi)     Trigger finger, acquired 05/22/2023    Trochanteric bursitis, left hip 05/15/2020    Greater trochanteric bursitis of left hip    Ulcerative blepharitis 05/22/2023    Vitamin D deficiency, unspecified 06/09/2016    Hypovitaminosis D    Weakness of both lower extremities 05/22/2023    Wrist stiffness 05/22/2023    Wrist weakness 05/22/2023        Surgical History  Surgical History[1]     Social History  She reports that she has never smoked. She has never used smokeless tobacco. She reports that she does not currently use alcohol. She reports that she does not use drugs.    Family History  Family History[2]     Allergies  Benzocaine, Simvastatin, and Tramadol    Review of Systems   Respiratory: Negative.     Cardiovascular: Negative.    Gastrointestinal: Negative.    Psychiatric/Behavioral: Negative.     All other systems negative     Physical exam:  Head: normocephalic  Eyes: see clinic note  Respiratory: per anesthesia  Cardiovascular: per anesthesia  Neuro: alert and interactive for age      Last Recorded Vitals  Blood pressure (!) 131/98, pulse 77, temperature 36.2 °C (97.2 °F), temperature source Temporal, resp. rate 16, height (!) 1.524 m (5'), weight 56.4 kg (124 lb 5.4 oz), SpO2 98%.    Relevant Results  Scheduled medications  Scheduled Medications[3]  Continuous medications  Continuous Medications[4]  PRN medications  PRN Medications[5]      Assessment & Plan  Combined form of age-related cataract, both eyes      Ms. Govea is a 80 y.o. female presenting with visually significant cataract of the left eye, here for cataract extraction and intraocular lens (IOL) implantation of the left eye.    Plan to proceed with surgery as above.      Sheila Mireles MD  Ophthalmology PGY-2       [1]   Past Surgical History:  Procedure Laterality Date    CARPAL TUNNEL RELEASE Bilateral     CATARACT EXTRACTION Right     Dr. Rodríguez    EYE SURGERY      HIP SURGERY  12/28/2015    Hip Surgery    HYSTERECTOMY  12/28/2015    Hysterectomy    JOINT REPLACEMENT Left     TKA    KIDNEY STONE SURGERY      MR HEAD ANGIO WO IV CONTRAST  12/29/2012    MR HEAD ANGIO WO IV CONTRAST [facility]    MR NECK ANGIO WO IV CONTRAST  12/29/2012    MR NECK ANGIO WO IV CONTRAST [facility]    TOTAL HIP ARTHROPLASTY  12/28/2015    Hip Replacement    TRIGGER FINGER RELEASE      [2]   Family History  Problem Relation Name Age of Onset    Diabetes Mother Shawanda     Arthritis Mother Shawanda     Stroke Mother Shawanda     Other (pacemaker) Father      Cataracts Sister Shawanda Kalina     Macular degeneration Sister Shawanda Kalina     Cataracts Sister Shawanda Kalina     Macular degeneration Sister Shawanda Kalina     Breast cancer Sibling      Breast cancer Other grandmother     Glaucoma Neg Hx     [3] [4] [5]

## 2025-07-10 NOTE — DISCHARGE INSTRUCTIONS
Start the following eye drops in the operative eye (LEFT eye):   Keep eye patch and shield on for 4 hours, then can remove eye patch but must leave clear plastic shield on, can take it off to start drops today    Postop instructions:  Prednisolone acetate drops:  4 times/day     Moxifloxacin drops:  4 times/day     **Please continue taking the eye drops for the RIGHT eye as recommended**    Sleep with shield at night for 7 days  No eye rubbing  May shower and wash face but no water inside surgery eye  No lifting any weight above 10lbs  Patient to resume home medications.   Please call immediately if you develop any redness, pain, decreased vision, flashes, floaters.   Office phone (after hours): [phone]   Hospital : [phone] (call this number if unable to reach someone on the phone above) then ask for ophthalmologist on call.        Follow up tomorrow with Dr. Rodríguez (isauro will call you to confirm appointment time)

## 2025-07-10 NOTE — ANESTHESIA POSTPROCEDURE EVALUATION
Patient: Vicky Govea    Procedure Summary       Date: 07/10/25 Room / Location: [facility] 02 / [facility]    Anesthesia Start: 1101 Anesthesia Stop: 1129    Procedure: Cataract extraction with intraocular lens implantation (Left) Diagnosis:       Combined form of age-related cataract, both eyes      (Combined form of age-related cataract, both eyes [H25.813])    Surgeons: Ronak Rodríguez MD Responsible Provider: Courtney Jonas MD    Anesthesia Type: MAC ASA Status: 3            Anesthesia Type: MAC    Vitals Value Taken Time   /85 07/10/25 11:25   Temp 36.1 °C (97 °F) 07/10/25 11:25   Pulse 88 07/10/25 11:25   Resp 16 07/10/25 11:25   SpO2 96 % 07/10/25 11:25       Anesthesia Post Evaluation    Patient location during evaluation: PACU  Patient participation: complete - patient participated  Level of consciousness: awake  Pain score: 0  Pain management: adequate  Airway patency: patent  Cardiovascular status: acceptable  Respiratory status: acceptable  Hydration status: acceptable  Postoperative Nausea and Vomiting: none        There were no known notable events for this encounter.

## 2025-07-10 NOTE — ANESTHESIA PREPROCEDURE EVALUATION
Patient: Vicky Govea    Procedure Information       Anesthesia Start Date/Time: 07/10/25 1101    Procedure: Cataract extraction with intraocular lens implantation (Left)    Location: [facility] 02 / [facility]    Surgeons: Ronak Rodríguez MD            Relevant Problems   Anesthesia   (+) PONV (postoperative nausea and vomiting)      Cardiac   (+) Benign essential hypertension   (+) Hyperlipidemia      Pulmonary   (+) COPD (chronic obstructive pulmonary disease) (Multi)      Neuro   (+) Idiopathic peripheral neuropathy      GI   (+) Crohn's disease of small intestine with rectal bleeding (Multi)   (+) GERD (gastroesophageal reflux disease)   (+) Irritable bowel syndrome with diarrhea      /Renal   (+) Calculus of kidney      Hematology   (+) Anemia      Musculoskeletal   (+) Primary osteoarthritis involving multiple joints      ID   (+) Acute bacterial conjunctivitis of left eye      GYN   (+) History of hysterectomy       Clinical information reviewed:   Tobacco  Allergies  Meds   Med Hx  Surg Hx   Fam Hx  Soc Hx        NPO Detail:  NPO/Void Status  Date of Last Liquid: 07/09/25  Time of Last Liquid: 2300  Date of Last Solid: 07/09/25  Time of Last Solid: 2300         Physical Exam    Airway  Mallampati: I  TM distance: >3 FB  Neck ROM: full     Cardiovascular - normal exam   Dental - normal exam     Pulmonary - normal exam   Abdominal - normal exam           Anesthesia Plan    History of general anesthesia?: yes  History of complications of general anesthesia?: no    ASA 3     MAC     Anesthetic plan and risks discussed with patient.    Plan discussed with CAA.

## 2025-07-10 NOTE — BRIEF OP NOTE
Date: 7/10/2025  OR Location: [facility] OR    Name: Vicky Govea, : 1945, Age: 80 y.o., MRN: [id number], Sex: female    Diagnosis  Pre-op Diagnosis      * Combined form of age-related cataract, both eyes [H25.813] Post-op Diagnosis     * Combined form of age-related cataract, both eyes [H25.813]     Procedures  Cataract extraction with intraocular lens implantation  63643 - RI XCAPSL CTRC RMVL INSJ IO LENS PROSTH W/O ECP      Surgeons      * Ronak Rodríguez - Primary    Resident/Fellow/Other Assistant:  Surgeons and Role:  * No surgeons found with a matching role *    Staff:   Circulator: Shawanda Schmitt Person: Malu    Anesthesia Staff: Anesthesiologist: Courtney Jonas MD  C-AA: IVETT Tuttle    Procedure Summary  Anesthesia: Anesthesia type not filed in the log.  ASA: ASA status not filed in the log.  Estimated Blood Loss: 0 mL  Intra-op Medications:   Administrations occurring from 1108 to 1153 on 07/10/25:   Medication Name Total Dose   EPINEPHrine HCl (PF) (Adrenalin) injection 0.5 mg   balanced salts (BSS) intraocular solution 500 mL   chondroitin sulf-sod hyaluron (Duovisc) intraocular kit 0.5 mL   lidocaine (Xylocaine) 20 mg/mL (2 %) injection 5 mL   neomycin-polymyxin-dexAMETHasone (Maxitrol) 3.5mg/mL-10,000 unit/mL-0.1 % ophthalmic suspension 2 drop   ePHEDrine 50 mg/mL 10 mg              Anesthesia Record               Intraprocedure I/O Totals       None           Specimen: No specimens collected               Findings: left eye cataract    Complications:  None; patient tolerated the procedure well.     Disposition: PACU - hemodynamically stable.  Condition: stable  Specimens Collected: No specimens collected  Attending Attestation: I was present and scrubbed for the entire procedure.    Ronak Rodríguez  Phone Number: [phone]

## 2025-07-11 ENCOUNTER — TELEPHONE (OUTPATIENT)
Dept: PRIMARY CARE | Facility: CLINIC | Age: 80
End: 2025-07-11

## 2025-07-11 ENCOUNTER — APPOINTMENT (OUTPATIENT)
Dept: OPHTHALMOLOGY | Facility: CLINIC | Age: 80
End: 2025-07-11
Payer: MEDICARE

## 2025-07-11 DIAGNOSIS — I10 BENIGN ESSENTIAL HYPERTENSION: ICD-10-CM

## 2025-07-11 DIAGNOSIS — Z96.1 PSEUDOPHAKIA: Primary | ICD-10-CM

## 2025-07-11 PROCEDURE — 99024 POSTOP FOLLOW-UP VISIT: CPT | Performed by: OPHTHALMOLOGY

## 2025-07-11 RX ORDER — AMLODIPINE BESYLATE 10 MG/1
10 TABLET ORAL DAILY
Qty: 90 TABLET | Refills: 1 | Status: SHIPPED | OUTPATIENT
Start: 2025-07-11

## 2025-07-11 RX ORDER — FAMOTIDINE 40 MG/1
TABLET, FILM COATED ORAL
COMMUNITY
Start: 2025-06-21

## 2025-07-11 RX ORDER — LISINOPRIL 20 MG/1
20 TABLET ORAL 2 TIMES DAILY
Qty: 180 TABLET | Refills: 1 | Status: SHIPPED | OUTPATIENT
Start: 2025-07-11

## 2025-07-11 RX ORDER — ONDANSETRON 4 MG/1
TABLET, FILM COATED ORAL
COMMUNITY
Start: 2025-01-20

## 2025-07-11 ASSESSMENT — VISUAL ACUITY
OD_SC: 20/20
OS_SC+: +1
CORRECTION_TYPE: GLASSES
OS_SC: 20/30
METHOD: SNELLEN - LINEAR

## 2025-07-11 ASSESSMENT — SLIT LAMP EXAM - LIDS: COMMENTS: GOOD POSITION, MGD

## 2025-07-11 ASSESSMENT — ENCOUNTER SYMPTOMS
HEMATOLOGIC/LYMPHATIC NEGATIVE: 0
RESPIRATORY NEGATIVE: 0
MUSCULOSKELETAL NEGATIVE: 0
GASTROINTESTINAL NEGATIVE: 0
CONSTITUTIONAL NEGATIVE: 0
NEUROLOGICAL NEGATIVE: 0
PSYCHIATRIC NEGATIVE: 0
CARDIOVASCULAR NEGATIVE: 0
EYES NEGATIVE: 1
ENDOCRINE NEGATIVE: 0
ALLERGIC/IMMUNOLOGIC NEGATIVE: 0

## 2025-07-11 ASSESSMENT — TONOMETRY
IOP_METHOD: GOLDMANN APPLANATION
OS_IOP_MMHG: 15

## 2025-07-11 ASSESSMENT — EXTERNAL EXAM - LEFT EYE: OS_EXAM: NORMAL

## 2025-07-11 ASSESSMENT — PAIN SCALES - GENERAL: PAINLEVEL_OUTOF10: 0-NO PAIN

## 2025-07-11 ASSESSMENT — EXTERNAL EXAM - RIGHT EYE: OD_EXAM: NORMAL

## 2025-07-11 NOTE — PROGRESS NOTES
Assessment/Plan   Diagnoses and all orders for this visit:  Pseudophakia of Left eye    POD#1, doing well, clear cornea.    Plan:  Pred QID  Moxi QID  Return precuations  See in 1 week.

## 2025-07-11 NOTE — PATIENT INSTRUCTIONS
Use tan top top drop 4 times daily for 1 week    Use Pink top drop 4 times daily for 1 week  Then 3 times daily for 1 week then twice daily for 1 week then once daily for 1 week then stop

## 2025-07-14 NOTE — TELEPHONE ENCOUNTER
Hi!  I have sent this over to Cierra to approve please check your pharmacy by tomorrow to make sure they have received it   Thank you,   Jyotsna

## 2025-07-16 ENCOUNTER — APPOINTMENT (OUTPATIENT)
Dept: OPHTHALMOLOGY | Facility: CLINIC | Age: 80
End: 2025-07-16
Payer: MEDICARE

## 2025-07-16 DIAGNOSIS — Z96.1 PSEUDOPHAKIA: Primary | ICD-10-CM

## 2025-07-16 PROCEDURE — 99024 POSTOP FOLLOW-UP VISIT: CPT | Performed by: OPHTHALMOLOGY

## 2025-07-16 ASSESSMENT — VISUAL ACUITY
OS_PH_SC: 20/25
METHOD: SNELLEN - LINEAR
OS_SC: 20/30
OD_PH_SC: 20/20
OD_SC: 20/25

## 2025-07-16 ASSESSMENT — EXTERNAL EXAM - RIGHT EYE: OD_EXAM: NORMAL

## 2025-07-16 ASSESSMENT — SLIT LAMP EXAM - LIDS: COMMENTS: GOOD POSITION, MGD

## 2025-07-16 ASSESSMENT — EXTERNAL EXAM - LEFT EYE: OS_EXAM: NORMAL

## 2025-07-16 NOTE — PROGRESS NOTES
Assessment/Plan   Diagnoses and all orders for this visit:  Pseudophakia of both eye    POM#1 post CEIOL OD, POW#1 post CEIOL OS  Doing well.     Plan:  Taper Pred QID  Stop Moxi QID  Return precuations  See in 1 month for refraction.

## 2025-07-22 ENCOUNTER — APPOINTMENT (OUTPATIENT)
Dept: OPHTHALMOLOGY | Facility: CLINIC | Age: 80
End: 2025-07-22
Payer: MEDICARE

## 2025-07-23 PROCEDURE — RXMED WILLOW AMBULATORY MEDICATION CHARGE

## 2025-07-25 ENCOUNTER — PHARMACY VISIT (OUTPATIENT)
Dept: PHARMACY | Facility: CLINIC | Age: 80
End: 2025-07-25
Payer: COMMERCIAL

## 2025-08-04 ENCOUNTER — TELEPHONE (OUTPATIENT)
Dept: OBSTETRICS AND GYNECOLOGY | Facility: CLINIC | Age: 80
End: 2025-08-04
Payer: MEDICARE

## 2025-08-04 DIAGNOSIS — Z12.31 SCREENING MAMMOGRAM FOR BREAST CANCER: Primary | ICD-10-CM

## 2025-08-08 ENCOUNTER — APPOINTMENT (OUTPATIENT)
Dept: OPHTHALMOLOGY | Facility: CLINIC | Age: 80
End: 2025-08-08
Payer: MEDICARE

## 2025-08-08 DIAGNOSIS — Z96.1 PSEUDOPHAKIA: Primary | ICD-10-CM

## 2025-08-08 PROCEDURE — 99024 POSTOP FOLLOW-UP VISIT: CPT | Performed by: OPHTHALMOLOGY

## 2025-08-08 ASSESSMENT — VISUAL ACUITY
OS_SC: 20/40
OS_PH_SC: 20/25
OD_SC+: +1
METHOD: SNELLEN - LINEAR
OD_SC: 20/25
OS_PH_SC+: +1

## 2025-08-08 ASSESSMENT — PAIN SCALES - GENERAL: PAINLEVEL_OUTOF10: 0-NO PAIN

## 2025-08-08 ASSESSMENT — TONOMETRY
OS_IOP_MMHG: 15
IOP_METHOD: TONOPEN
OD_IOP_MMHG: 18

## 2025-08-08 ASSESSMENT — REFRACTION_WEARINGRX
OS_SPHERE: +2.50
OD_SPHERE: +2.50
SPECS_TYPE: OTC

## 2025-08-08 ASSESSMENT — SLIT LAMP EXAM - LIDS: COMMENTS: GOOD POSITION, MGD

## 2025-08-08 ASSESSMENT — EXTERNAL EXAM - RIGHT EYE: OD_EXAM: NORMAL

## 2025-08-08 ASSESSMENT — EXTERNAL EXAM - LEFT EYE: OS_EXAM: NORMAL

## 2025-08-08 ASSESSMENT — REFRACTION_MANIFEST
OS_AXIS: 150
METHOD_AUTOREFRACTION: 1
OS_SPHERE: -0.50
OD_SPHERE: -1.00
OS_CYLINDER: +0.75
OD_AXIS: 018
OD_CYLINDER: +1.00

## 2025-08-08 NOTE — PROGRESS NOTES
Assessment/Plan   Diagnoses and all orders for this visit:  Pseudophakia of both eye    POM#2 post CEIOL OD, POM#1 post CEIOL OS  Doing well.     Plan:  Glasses RX given  Return precuations  See Dr. Hinds as scheduled.  Me in 1 year

## 2025-08-11 ENCOUNTER — APPOINTMENT (OUTPATIENT)
Dept: UROLOGY | Facility: CLINIC | Age: 80
End: 2025-08-11
Payer: MEDICARE

## 2025-08-18 ENCOUNTER — APPOINTMENT (OUTPATIENT)
Dept: OPHTHALMOLOGY | Facility: CLINIC | Age: 80
End: 2025-08-18
Payer: MEDICARE

## 2025-08-21 ENCOUNTER — APPOINTMENT (OUTPATIENT)
Dept: RADIOLOGY | Facility: CLINIC | Age: 80
End: 2025-08-21
Payer: MEDICARE

## 2025-08-21 DIAGNOSIS — Z12.31 SCREENING MAMMOGRAM FOR BREAST CANCER: ICD-10-CM

## 2025-08-21 PROCEDURE — 77063 BREAST TOMOSYNTHESIS BI: CPT

## 2025-08-21 PROCEDURE — 77067 SCR MAMMO BI INCL CAD: CPT | Performed by: GENERAL PRACTICE

## 2025-08-21 PROCEDURE — 77063 BREAST TOMOSYNTHESIS BI: CPT | Performed by: GENERAL PRACTICE

## 2025-09-09 ENCOUNTER — APPOINTMENT (OUTPATIENT)
Dept: PAIN MEDICINE | Facility: CLINIC | Age: 80
End: 2025-09-09
Payer: MEDICARE

## 2025-09-16 ENCOUNTER — APPOINTMENT (OUTPATIENT)
Dept: PRIMARY CARE | Facility: CLINIC | Age: 80
End: 2025-09-16
Payer: MEDICARE

## 2025-09-30 ENCOUNTER — APPOINTMENT (OUTPATIENT)
Dept: OPHTHALMOLOGY | Facility: CLINIC | Age: 80
End: 2025-09-30
Payer: MEDICARE

## 2025-10-02 ENCOUNTER — APPOINTMENT (OUTPATIENT)
Dept: OPHTHALMOLOGY | Facility: CLINIC | Age: 80
End: 2025-10-02
Payer: MEDICARE

## 2025-10-07 ENCOUNTER — APPOINTMENT (OUTPATIENT)
Dept: NEPHROLOGY | Facility: CLINIC | Age: 80
End: 2025-10-07
Payer: MEDICARE

## 2025-10-23 ENCOUNTER — APPOINTMENT (OUTPATIENT)
Dept: UROLOGY | Facility: CLINIC | Age: 80
End: 2025-10-23
Payer: MEDICARE

## 2025-12-16 ENCOUNTER — APPOINTMENT (OUTPATIENT)
Dept: PHARMACY | Facility: HOSPITAL | Age: 80
End: 2025-12-16
Payer: MEDICARE

## 2026-08-14 ENCOUNTER — APPOINTMENT (OUTPATIENT)
Dept: OPHTHALMOLOGY | Facility: CLINIC | Age: 81
End: 2026-08-14
Payer: MEDICARE

## (undated) DEVICE — SYRINGE, 60 CC, LUER LOCK, MONOJECT

## (undated) DEVICE — CANNULA, HYDRODISSECTION, MICRO, 25 G X 8 MM

## (undated) DEVICE — NEEDLE, SPINAL, 20 G X 3.5 IN, YELLOW HUB

## (undated) DEVICE — APPLICATOR, FOAM BARRIER, LARGE

## (undated) DEVICE — NEEDLE, HYPODERMIC, REGULAR WALL, REGULAR BEVEL, 30 G X 0.5 IN

## (undated) DEVICE — TIP, SUCTION, YANKAUER, FLEXIBLE

## (undated) DEVICE — SYRINGE, 1 CC, LUER LOCK

## (undated) DEVICE — DRESSING, MEPILEX BORDER, POST-OP AG, 4 X 10 IN

## (undated) DEVICE — COVER, TABLE, 44X90

## (undated) DEVICE — KIT, MINOR, DOUBLE BASIN

## (undated) DEVICE — SUTURE, FIBERWIRE 2, T-5 TAPER NEEDLE, 38"

## (undated) DEVICE — BOWL, MIXING, W/SPATULA, DISPOSABLE

## (undated) DEVICE — HANDPIECE,  IRRIGATION/ASPIRATION, 45DEG, 2.2MM-2.8MM, BLUE

## (undated) DEVICE — IRRIGATION SYSTEM, WOUND, PULSAVAC PLUS

## (undated) DEVICE — HOOD, SURGICAL, FLYTE, T7 PLUS, PEEL AWAY SHIELD

## (undated) DEVICE — TRAY, MINOR, SINGLE BASIN, STERILE

## (undated) DEVICE — Device

## (undated) DEVICE — SUTURE, QUILL, BARBED, PDO, 2, 24 X 24CM, T8 36MM TAPER POINT, 1/2 CIRCLE

## (undated) DEVICE — NEEDLE, HYPODERMIC, REGULAR WALL, REGULAR BEVEL, 18 G X 1.5 IN

## (undated) DEVICE — SUTURE, CTD, VICRYL, 2-0, UND, BR, CT-2

## (undated) DEVICE — DRESSING, TRANSPARENT, TEGADERM, 2-3/8 X 2-3/4 IN

## (undated) DEVICE — STRIP, SKIN CLOSURE, STERI STRIP, REINFORCED, 0.5 X 4 IN

## (undated) DEVICE — CUFF, TOURNIQUET, 34 X 4, SNGL PORT/SNGL BLADDER, DISP, LF

## (undated) DEVICE — DRAPE PACK, TOTAL KNEE, CUSTOM, GEAUGA

## (undated) DEVICE — BANDAGE, ELASTIC,  6 IN X 11 YDS, STERILE, LF

## (undated) DEVICE — GLOVE, SURGICAL, PROTEXIS PI , 7.5, PF, LF

## (undated) DEVICE — BLADE, RECIPROCATOR 12.5 X 76 X 0.9MM

## (undated) DEVICE — CATHETER TRAY, SURESTEP, 14FR, PRECONNECTED DRAIN BAG

## (undated) DEVICE — SUTURE, VICRYL, 1, 36 IN, CT-1, UNDYED

## (undated) DEVICE — BLADE, LARGE BONE, HALL, OSCILLATING, 13X90X1.27MM

## (undated) DEVICE — SUTURE, V-LOC, 3-0, 23IN, P-12, 90 ABS